# Patient Record
Sex: FEMALE | Race: WHITE | NOT HISPANIC OR LATINO | Employment: UNEMPLOYED | URBAN - METROPOLITAN AREA
[De-identification: names, ages, dates, MRNs, and addresses within clinical notes are randomized per-mention and may not be internally consistent; named-entity substitution may affect disease eponyms.]

---

## 2023-03-21 PROBLEM — S09.90XA HEAD INJURY: Status: ACTIVE | Noted: 2023-03-21

## 2023-03-21 PROBLEM — J30.2 SEASONAL ALLERGIES: Status: ACTIVE | Noted: 2023-03-21

## 2023-03-21 PROBLEM — G47.33 SEVERE OBSTRUCTIVE SLEEP APNEA: Status: ACTIVE | Noted: 2023-03-21

## 2023-03-21 PROBLEM — G47.00 INSOMNIA: Status: ACTIVE | Noted: 2023-03-21

## 2023-03-21 PROBLEM — S49.91XA INJURY OF RIGHT SHOULDER: Status: ACTIVE | Noted: 2023-03-21

## 2023-03-21 PROBLEM — R13.10 DYSPHAGIA: Status: ACTIVE | Noted: 2023-03-21
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
## 2023-03-21 PROBLEM — R00.2 PALPITATIONS: Status: ACTIVE | Noted: 2023-03-21

## 2023-03-21 PROBLEM — R06.02 SHORTNESS OF BREATH AT REST: Status: ACTIVE | Noted: 2023-03-21

## 2023-03-21 PROBLEM — L50.0 ALLERGIC URTICARIA: Status: ACTIVE | Noted: 2023-03-21

## 2023-03-21 PROBLEM — R42 DIZZINESS: Status: ACTIVE | Noted: 2023-03-21

## 2023-03-21 PROBLEM — B35.9 DERMATOPHYTOSIS: Status: ACTIVE | Noted: 2023-03-21

## 2023-03-21 PROBLEM — R19.8 GASTROINTESTINAL COMPLAINTS: Status: ACTIVE | Noted: 2023-03-21

## 2023-03-21 PROBLEM — R43.1 PAROSMIA: Status: ACTIVE | Noted: 2023-03-21

## 2023-03-21 PROBLEM — F41.9 ANXIETY: Status: ACTIVE | Noted: 2023-03-21

## 2023-03-21 PROBLEM — L65.9 HAIR LOSS: Status: ACTIVE | Noted: 2023-03-21

## 2023-03-21 PROBLEM — E07.89 THYROID FULLNESS: Status: ACTIVE | Noted: 2023-03-21

## 2023-03-21 PROBLEM — J96.10 CHRONIC RESPIRATORY FAILURE (MULTI): Status: ACTIVE | Noted: 2023-03-21

## 2023-03-21 PROBLEM — R25.1 TREMOR: Status: ACTIVE | Noted: 2023-03-21

## 2023-03-21 PROBLEM — M25.50 JOINT PAIN: Status: ACTIVE | Noted: 2023-03-21

## 2023-03-21 PROBLEM — R10.9 FLANK PAIN: Status: ACTIVE | Noted: 2023-03-21

## 2023-03-21 PROBLEM — E55.9 VITAMIN D DEFICIENCY: Status: ACTIVE | Noted: 2023-03-21

## 2023-03-21 PROBLEM — M62.838 CERVICAL PARASPINAL MUSCLE SPASM: Status: ACTIVE | Noted: 2023-03-21

## 2023-03-21 PROBLEM — R41.0 CONFUSION: Status: ACTIVE | Noted: 2023-03-21

## 2023-03-21 PROBLEM — R09.02 HYPOXIC: Status: ACTIVE | Noted: 2023-03-21

## 2023-03-21 PROBLEM — M19.90 ARTHRITIS: Status: ACTIVE | Noted: 2023-03-21

## 2023-03-21 PROBLEM — M79.10 MYALGIA: Status: ACTIVE | Noted: 2023-03-21

## 2023-03-21 PROBLEM — Q21.12 PFO (PATENT FORAMEN OVALE) (HHS-HCC): Status: ACTIVE | Noted: 2023-03-21

## 2023-03-21 PROBLEM — R06.09 OTHER FORMS OF DYSPNEA: Status: ACTIVE | Noted: 2023-03-21

## 2023-03-21 PROBLEM — R51.9 HEADACHE: Status: ACTIVE | Noted: 2023-03-21

## 2023-03-21 PROBLEM — M54.10 RADICULOPATHY OF ARM: Status: ACTIVE | Noted: 2023-03-21

## 2023-03-21 PROBLEM — I63.9 CVA (CEREBRAL VASCULAR ACCIDENT) (MULTI): Status: ACTIVE | Noted: 2023-03-21

## 2023-03-21 PROBLEM — R20.0 NUMBNESS AND TINGLING: Status: ACTIVE | Noted: 2023-03-21

## 2023-03-21 PROBLEM — E78.1 HYPERTRIGLYCERIDEMIA: Status: ACTIVE | Noted: 2023-03-21

## 2023-03-21 PROBLEM — R79.89 ELEVATED LFTS: Status: ACTIVE | Noted: 2023-03-21

## 2023-03-21 PROBLEM — R20.2 NUMBNESS AND TINGLING: Status: ACTIVE | Noted: 2023-03-21

## 2023-03-21 PROBLEM — F41.1 GAD (GENERALIZED ANXIETY DISORDER): Status: ACTIVE | Noted: 2023-03-21

## 2023-03-21 PROBLEM — U09.9 POST COVID-19 CONDITION, UNSPECIFIED: Status: ACTIVE | Noted: 2023-03-21

## 2023-03-21 PROBLEM — R53.83 FATIGUE: Status: ACTIVE | Noted: 2023-03-21

## 2023-03-21 PROBLEM — R41.89 BRAIN FOG: Status: ACTIVE | Noted: 2023-03-21

## 2023-03-21 PROBLEM — Z86.16 PERSONAL HISTORY OF COVID-19: Status: ACTIVE | Noted: 2023-03-21

## 2023-03-21 PROBLEM — J96.01 ACUTE RESPIRATORY FAILURE WITH HYPOXIA (MULTI): Status: ACTIVE | Noted: 2023-03-21

## 2023-03-21 PROBLEM — I10 BENIGN ESSENTIAL HTN: Status: ACTIVE | Noted: 2023-03-21

## 2023-03-21 PROBLEM — R21 RASH: Status: ACTIVE | Noted: 2023-03-21

## 2023-03-21 PROBLEM — J31.0 RHINITIS: Status: ACTIVE | Noted: 2023-03-21

## 2023-03-21 PROBLEM — F90.9 ATTENTION DEFICIT HYPERACTIVITY DISORDER (ADHD): Status: ACTIVE | Noted: 2023-03-21

## 2023-03-21 PROBLEM — K85.90 PANCREATITIS, ACUTE (HHS-HCC): Status: ACTIVE | Noted: 2023-03-21

## 2023-03-21 PROBLEM — G47.30 SLEEP DISORDER BREATHING: Status: ACTIVE | Noted: 2023-03-21

## 2023-03-21 RX ORDER — ONDANSETRON 4 MG/1
1 TABLET, FILM COATED ORAL EVERY 4 HOURS PRN
COMMUNITY
Start: 2022-12-29 | End: 2023-12-08 | Stop reason: ENTERED-IN-ERROR

## 2023-03-21 RX ORDER — LEVALBUTEROL INHALATION SOLUTION 0.63 MG/3ML
0.63 SOLUTION RESPIRATORY (INHALATION) AS NEEDED
COMMUNITY
Start: 2022-09-08 | End: 2023-11-28 | Stop reason: WASHOUT

## 2023-03-21 RX ORDER — HYDROCODONE BITARTRATE AND ACETAMINOPHEN 5; 300 MG/1; MG/1
1-2 TABLET ORAL AS NEEDED
COMMUNITY
Start: 2022-12-22 | End: 2023-04-07 | Stop reason: ALTCHOICE

## 2023-03-21 RX ORDER — CHOLECALCIFEROL (VITAMIN D3) 125 MCG
CAPSULE ORAL
COMMUNITY
End: 2023-04-07 | Stop reason: ALTCHOICE

## 2023-03-21 RX ORDER — AZELASTINE HCL 205.5 UG/1
2 SPRAY NASAL 2 TIMES DAILY
COMMUNITY
Start: 2022-09-12 | End: 2023-04-07 | Stop reason: ALTCHOICE

## 2023-03-21 RX ORDER — HYDROXYZINE HYDROCHLORIDE 25 MG/1
1-2 TABLET, FILM COATED ORAL NIGHTLY
COMMUNITY
Start: 2021-11-17 | End: 2023-04-07 | Stop reason: SDUPTHER

## 2023-03-21 RX ORDER — PRIMIDONE 50 MG/1
2 TABLET ORAL DAILY
COMMUNITY
Start: 2022-04-11 | End: 2023-04-07 | Stop reason: SDUPTHER

## 2023-03-21 RX ORDER — PANTOPRAZOLE SODIUM 40 MG/1
1 TABLET, DELAYED RELEASE ORAL DAILY
COMMUNITY
Start: 2021-11-17 | End: 2023-11-28 | Stop reason: WASHOUT

## 2023-03-21 RX ORDER — TRIAMCINOLONE ACETONIDE 1 MG/G
CREAM TOPICAL
COMMUNITY
Start: 2020-08-19 | End: 2023-11-28 | Stop reason: WASHOUT

## 2023-03-21 RX ORDER — SERTRALINE HYDROCHLORIDE 100 MG/1
1.5 TABLET, FILM COATED ORAL DAILY
COMMUNITY
Start: 2017-04-11 | End: 2023-11-02 | Stop reason: SDUPTHER

## 2023-03-21 RX ORDER — ASPIRIN 81 MG/1
1 TABLET ORAL DAILY
COMMUNITY
End: 2023-04-07 | Stop reason: ALTCHOICE

## 2023-03-21 RX ORDER — GUAIFENESIN 600 MG/1
600 TABLET, EXTENDED RELEASE ORAL EVERY 12 HOURS PRN
COMMUNITY
Start: 2021-11-17 | End: 2024-04-30 | Stop reason: ALTCHOICE

## 2023-03-21 RX ORDER — FLUOCINONIDE 0.5 MG/G
CREAM TOPICAL
COMMUNITY
Start: 2021-05-26 | End: 2023-11-28 | Stop reason: WASHOUT

## 2023-03-21 RX ORDER — AMOXICILLIN 500 MG/1
TABLET, FILM COATED ORAL
COMMUNITY
Start: 2022-03-09 | End: 2023-04-07 | Stop reason: ALTCHOICE

## 2023-03-21 RX ORDER — IPRATROPIUM BROMIDE AND ALBUTEROL SULFATE 2.5; .5 MG/3ML; MG/3ML
3 SOLUTION RESPIRATORY (INHALATION) EVERY 6 HOURS PRN
COMMUNITY
Start: 2021-11-17 | End: 2023-12-08 | Stop reason: ENTERED-IN-ERROR

## 2023-03-21 RX ORDER — PRIMIDONE 50 MG/1
50 TABLET ORAL 2 TIMES DAILY PRN
COMMUNITY
Start: 2022-04-11

## 2023-03-21 RX ORDER — FLUTICASONE PROPIONATE 50 MCG
2 SPRAY, SUSPENSION (ML) NASAL DAILY PRN
COMMUNITY
Start: 2017-09-07 | End: 2024-02-08 | Stop reason: WASHOUT

## 2023-03-21 RX ORDER — CLOPIDOGREL BISULFATE 75 MG/1
1 TABLET ORAL DAILY
COMMUNITY
Start: 2022-02-18 | End: 2023-11-28 | Stop reason: ALTCHOICE

## 2023-03-21 RX ORDER — NAPROXEN 500 MG/1
500 TABLET ORAL EVERY 12 HOURS
COMMUNITY
Start: 2023-01-17 | End: 2023-04-07 | Stop reason: ALTCHOICE

## 2023-03-21 RX ORDER — FLUTICASONE PROPIONATE AND SALMETEROL 500; 50 UG/1; UG/1
1 POWDER RESPIRATORY (INHALATION) 2 TIMES DAILY
COMMUNITY
Start: 2022-02-01 | End: 2023-11-28 | Stop reason: WASHOUT

## 2023-03-21 RX ORDER — ACETAMINOPHEN 325 MG/1
TABLET ORAL EVERY 6 HOURS PRN
COMMUNITY
Start: 2022-03-09 | End: 2023-04-07 | Stop reason: ALTCHOICE

## 2023-03-21 RX ORDER — CLOTRIMAZOLE AND BETAMETHASONE DIPROPIONATE 10; .64 MG/G; MG/G
1 CREAM TOPICAL 2 TIMES DAILY
COMMUNITY
Start: 2021-07-13 | End: 2023-04-07 | Stop reason: ALTCHOICE

## 2023-03-21 RX ORDER — ELECTROLYTES/DEXTROSE
SOLUTION, ORAL ORAL
COMMUNITY
End: 2023-04-07 | Stop reason: ALTCHOICE

## 2023-03-21 RX ORDER — PRAVASTATIN SODIUM 20 MG/1
20 TABLET ORAL NIGHTLY
COMMUNITY
Start: 2022-06-24 | End: 2023-11-28 | Stop reason: WASHOUT

## 2023-03-21 RX ORDER — HYDROCODONE BITARTRATE AND ACETAMINOPHEN 5; 300 MG/1; MG/1
TABLET ORAL
COMMUNITY
Start: 2022-12-22 | End: 2023-04-07 | Stop reason: ALTCHOICE

## 2023-03-21 RX ORDER — METOPROLOL SUCCINATE 50 MG/1
1.5 TABLET, EXTENDED RELEASE ORAL DAILY
COMMUNITY
Start: 2019-08-29 | End: 2023-11-28 | Stop reason: WASHOUT

## 2023-03-21 RX ORDER — CYCLOBENZAPRINE HCL 10 MG
10 TABLET ORAL AS NEEDED
COMMUNITY
Start: 2022-11-10 | End: 2023-04-07 | Stop reason: ALTCHOICE

## 2023-04-02 PROBLEM — S09.90XA INJURY OF HEAD: Status: ACTIVE | Noted: 2023-04-02

## 2023-04-02 PROBLEM — U07.1 COVID-19: Status: ACTIVE | Noted: 2023-04-02

## 2023-04-02 PROBLEM — R06.02 SHORTNESS OF BREATH: Status: ACTIVE | Noted: 2023-04-02

## 2023-04-02 PROBLEM — R06.09 CHRONIC DYSPNEA: Status: ACTIVE | Noted: 2023-04-02

## 2023-04-03 ENCOUNTER — APPOINTMENT (OUTPATIENT)
Dept: PRIMARY CARE | Facility: CLINIC | Age: 58
End: 2023-04-03
Payer: COMMERCIAL

## 2023-04-07 ENCOUNTER — OFFICE VISIT (OUTPATIENT)
Dept: PRIMARY CARE | Facility: CLINIC | Age: 58
End: 2023-04-07
Payer: COMMERCIAL

## 2023-04-07 VITALS
WEIGHT: 137.4 LBS | HEART RATE: 79 BPM | BODY MASS INDEX: 22.18 KG/M2 | DIASTOLIC BLOOD PRESSURE: 74 MMHG | OXYGEN SATURATION: 99 % | SYSTOLIC BLOOD PRESSURE: 100 MMHG

## 2023-04-07 DIAGNOSIS — M19.031 LOCALIZED PRIMARY OSTEOARTHRITIS OF CARPOMETACARPAL (CMC) JOINT OF RIGHT WRIST: Primary | ICD-10-CM

## 2023-04-07 DIAGNOSIS — L50.0 ALLERGIC URTICARIA: ICD-10-CM

## 2023-04-07 DIAGNOSIS — M79.10 MYALGIA: ICD-10-CM

## 2023-04-07 DIAGNOSIS — R55 SYNCOPE, UNSPECIFIED SYNCOPE TYPE: ICD-10-CM

## 2023-04-07 PROBLEM — M25.559 HIP PAIN: Status: ACTIVE | Noted: 2023-04-07

## 2023-04-07 PROBLEM — Z98.890 HISTORY OF ENDOMETRIAL ABLATION: Status: ACTIVE | Noted: 2017-03-27

## 2023-04-07 PROBLEM — N81.11 CYSTOCELE, MIDLINE: Status: ACTIVE | Noted: 2017-03-27

## 2023-04-07 PROBLEM — S69.90XA INJURY OF WRIST: Status: ACTIVE | Noted: 2023-04-07

## 2023-04-07 PROBLEM — F98.8 CHILD ATTENTION DEFICIT DISORDER: Status: ACTIVE | Noted: 2023-04-07

## 2023-04-07 PROBLEM — K21.9 GERD (GASTROESOPHAGEAL REFLUX DISEASE): Status: ACTIVE | Noted: 2023-04-07

## 2023-04-07 PROBLEM — F32.A DEPRESSIVE DISORDER: Status: ACTIVE | Noted: 2023-04-07

## 2023-04-07 PROBLEM — J30.9 ALLERGIC RHINITIS: Status: ACTIVE | Noted: 2023-04-07

## 2023-04-07 PROBLEM — Z86.018 HISTORY OF UTERINE FIBROID: Status: ACTIVE | Noted: 2017-03-27

## 2023-04-07 PROBLEM — M77.10 LATERAL EPICONDYLITIS: Status: ACTIVE | Noted: 2023-04-07

## 2023-04-07 PROBLEM — F90.0 ATTENTION DEFICIT HYPERACTIVITY DISORDER, PREDOMINANTLY INATTENTIVE TYPE: Status: ACTIVE | Noted: 2023-04-07

## 2023-04-07 PROCEDURE — 3008F BODY MASS INDEX DOCD: CPT | Performed by: STUDENT IN AN ORGANIZED HEALTH CARE EDUCATION/TRAINING PROGRAM

## 2023-04-07 PROCEDURE — 3078F DIAST BP <80 MM HG: CPT | Performed by: STUDENT IN AN ORGANIZED HEALTH CARE EDUCATION/TRAINING PROGRAM

## 2023-04-07 PROCEDURE — 99214 OFFICE O/P EST MOD 30 MIN: CPT | Performed by: STUDENT IN AN ORGANIZED HEALTH CARE EDUCATION/TRAINING PROGRAM

## 2023-04-07 PROCEDURE — 3074F SYST BP LT 130 MM HG: CPT | Performed by: STUDENT IN AN ORGANIZED HEALTH CARE EDUCATION/TRAINING PROGRAM

## 2023-04-07 RX ORDER — NAPROXEN SODIUM 220 MG/1
81 TABLET, FILM COATED ORAL DAILY
COMMUNITY
Start: 2022-04-20

## 2023-04-07 RX ORDER — METOPROLOL SUCCINATE 25 MG/1
1 TABLET, EXTENDED RELEASE ORAL DAILY
COMMUNITY
Start: 2022-08-16 | End: 2023-12-08 | Stop reason: ENTERED-IN-ERROR

## 2023-04-07 RX ORDER — KETOROLAC TROMETHAMINE 30 MG/ML
2 INJECTION, SOLUTION INTRAMUSCULAR; INTRAVENOUS
COMMUNITY
End: 2023-04-07 | Stop reason: ALTCHOICE

## 2023-04-07 RX ORDER — DEXAMETHASONE 4 MG/1
1 TABLET ORAL 2 TIMES DAILY
COMMUNITY
Start: 2022-08-05 | End: 2023-11-28 | Stop reason: WASHOUT

## 2023-04-07 RX ORDER — SERTRALINE HYDROCHLORIDE 100 MG/1
100 TABLET, FILM COATED ORAL
COMMUNITY
End: 2023-11-28 | Stop reason: WASHOUT

## 2023-04-07 RX ORDER — NIRMATRELVIR AND RITONAVIR 300-100 MG
KIT ORAL
COMMUNITY
Start: 2022-08-30 | End: 2023-11-28 | Stop reason: WASHOUT

## 2023-04-07 RX ORDER — NAPROXEN SODIUM 550 MG/1
TABLET ORAL
COMMUNITY
End: 2023-11-28 | Stop reason: WASHOUT

## 2023-04-07 RX ORDER — CYCLOBENZAPRINE HCL 10 MG
TABLET ORAL
COMMUNITY
End: 2023-07-18 | Stop reason: SDUPTHER

## 2023-04-07 RX ORDER — CLINDAMYCIN PHOSPHATE 11.9 MG/ML
SOLUTION TOPICAL
COMMUNITY
Start: 2022-08-05 | End: 2023-12-08 | Stop reason: ENTERED-IN-ERROR

## 2023-04-07 RX ORDER — OMEPRAZOLE 40 MG/1
40 CAPSULE, DELAYED RELEASE ORAL
COMMUNITY
End: 2023-12-08 | Stop reason: ENTERED-IN-ERROR

## 2023-04-07 RX ORDER — ALBUTEROL SULFATE 90 UG/1
2 AEROSOL, METERED RESPIRATORY (INHALATION) EVERY 4 HOURS PRN
COMMUNITY

## 2023-04-07 RX ORDER — CLOBETASOL PROPIONATE 0.5 MG/G
CREAM TOPICAL
COMMUNITY
Start: 2022-08-07 | End: 2023-11-28 | Stop reason: WASHOUT

## 2023-04-07 RX ORDER — PRAVASTATIN SODIUM 40 MG/1
40 TABLET ORAL NIGHTLY
COMMUNITY
Start: 2022-06-14 | End: 2023-12-08 | Stop reason: ENTERED-IN-ERROR

## 2023-04-07 RX ORDER — HYDROCODONE BITARTRATE AND ACETAMINOPHEN 5; 325 MG/1; MG/1
TABLET ORAL
COMMUNITY
Start: 2022-11-11 | End: 2023-04-07 | Stop reason: ALTCHOICE

## 2023-04-07 RX ORDER — ATORVASTATIN CALCIUM 80 MG/1
1 TABLET, FILM COATED ORAL DAILY
COMMUNITY
Start: 2022-05-01 | End: 2023-12-08 | Stop reason: ENTERED-IN-ERROR

## 2023-04-07 RX ORDER — HYDROXYZINE HYDROCHLORIDE 25 MG/1
25 TABLET, FILM COATED ORAL EVERY 8 HOURS PRN
Qty: 90 TABLET | Refills: 2 | Status: SHIPPED | OUTPATIENT
Start: 2023-04-07 | End: 2024-05-13 | Stop reason: SDUPTHER

## 2023-04-07 RX ORDER — NAPROXEN 375 MG/1
TABLET ORAL
COMMUNITY
End: 2023-04-07 | Stop reason: ALTCHOICE

## 2023-04-07 RX ORDER — NAPROXEN 500 MG/1
500 TABLET ORAL EVERY 12 HOURS
Qty: 60 TABLET | Refills: 2 | Status: SHIPPED | OUTPATIENT
Start: 2023-04-07 | End: 2023-05-07

## 2023-04-07 RX ORDER — DEXTROAMPHETAMINE SACCHARATE, AMPHETAMINE ASPARTATE, DEXTROAMPHETAMINE SULFATE AND AMPHETAMINE SULFATE 7.5; 7.5; 7.5; 7.5 MG/1; MG/1; MG/1; MG/1
TABLET ORAL
COMMUNITY
End: 2023-11-28 | Stop reason: WASHOUT

## 2023-04-07 RX ORDER — HYDROCODONE BITARTRATE AND ACETAMINOPHEN 5; 325 MG/1; MG/1
1 TABLET ORAL EVERY 6 HOURS PRN
Qty: 20 TABLET | Refills: 0 | Status: SHIPPED | OUTPATIENT
Start: 2023-04-07 | End: 2023-04-17

## 2023-04-07 NOTE — PATIENT INSTRUCTIONS
1.  Hospital follow-up after syncope event.  There was a medication change metoprolol was increased.  Advised to go back on metoprolol 50 mg once per day.    We will order additional work-up including Holter monitor echocardiogram, EEG, and carotid duplex.    2.  Severe right thumb CMC joint arthritis.  Has needed injection therapy before.  Would advise in seeing hand surgeon.    Andres Vega DO  for questions and f/u please call office   [place] [phone] [facility] [phone]  any forms needed please fax   [place] [phone] [facility] [phone]  for Physical therapy orders can call [phone]  for Radiology orders can call [phone]  for general referrals can call [phone]  for cardiac testing can call [phone]

## 2023-04-08 NOTE — PROGRESS NOTES
Subjective   Patient ID: Nicolasa Burger is a 57 y.o. female who presents for Hospital Follow-up and Med Refill.    HPI comes in for hospital follow-up after syncopal event.  Essentially work-up was negative in the hospital.  CT does show evidence of a prior stroke.    Review of Systems  Constitutional: NO F, chills, or sweats  Eyes: no blurred vision or visual disturbance  ENT: no hearing loss, no congestion, no nasal discharge, no hoarseness and no sore throat.   Cardiovascular: no chest pain, no edema, no palps, hospital follow-up for syncope  Respiratory: no cough,no s.o.b. and no wheezing  Gastrointestinal: no abdominal pain, No C/D no N/V, no blood in stools  Genitourinary: no dysuria, no change in urinary frequency, no urinary hesitancy and no feelings of urinary urgency.   Musculoskeletal bilateral thumb pain hand pain trigger thumbs  Integumentary: no new skin lesions and no rashes.   Neurological: no difficulty walking, no headache, no limb weakness, no numbness and no tingling.       Objective   /74 (BP Location: Left arm, Patient Position: Sitting, BP Cuff Size: Adult)   Pulse 79   Wt 62.3 kg (137 lb 6.4 oz)   SpO2 99%   BMI 22.18 kg/m²     Physical Exam  gen- a & o x 3, nad, pleasant  heent- eomi, perrla, ear canals patent, TM's non-erythematous, no fluid, frontal and maxillary sinus's nontender  neck- supple, nontender, no palpable or enlarged nodes, no thyromegaly  heart- rrr, no murmurs  lungs- cta b/l , no w/r/r  chest- symmetric, nontender  ab- soft, nontender, no palpable organomegaly, postive bowel sounds  ex's- no c/c/e  neuro- CNs 2-12 grossly intact, full sensation and strength in all extremities    Assessment/Plan     1.  Hospital follow-up after syncope event.  There was a medication change metoprolol was increased.  Advised to go back on metoprolol 50 mg once per day.    We will order additional work-up including Holter monitor echocardiogram, EEG, and carotid duplex.    2.  Severe  right thumb CMC joint arthritis.  Has needed injection therapy before.  Would advise in seeing hand surgeon.

## 2023-04-18 DIAGNOSIS — R55 SYNCOPE, UNSPECIFIED SYNCOPE TYPE: ICD-10-CM

## 2023-07-18 DIAGNOSIS — M19.90 ARTHRITIS: Primary | ICD-10-CM

## 2023-07-18 RX ORDER — CYCLOBENZAPRINE HCL 10 MG
TABLET ORAL
Qty: 30 TABLET | Refills: 2 | Status: SHIPPED | OUTPATIENT
Start: 2023-07-18 | End: 2023-08-04 | Stop reason: SDUPTHER

## 2023-08-03 ENCOUNTER — TELEPHONE (OUTPATIENT)
Dept: PRIMARY CARE | Facility: CLINIC | Age: 58
End: 2023-08-03
Payer: COMMERCIAL

## 2023-08-03 DIAGNOSIS — M19.90 ARTHRITIS: ICD-10-CM

## 2023-08-03 NOTE — TELEPHONE ENCOUNTER
[facility] pharmacy left a VM needing directions for cyclobenzaprine 10mg. Please send in new prescription with directions.

## 2023-08-04 RX ORDER — CYCLOBENZAPRINE HCL 10 MG
TABLET ORAL
Qty: 30 TABLET | Refills: 2 | Status: SHIPPED | OUTPATIENT
Start: 2023-08-04 | End: 2023-08-11 | Stop reason: SDUPTHER

## 2023-08-11 DIAGNOSIS — M19.90 ARTHRITIS: ICD-10-CM

## 2023-08-11 RX ORDER — CYCLOBENZAPRINE HCL 10 MG
TABLET ORAL
Qty: 30 TABLET | Refills: 2 | Status: SHIPPED | OUTPATIENT
Start: 2023-08-11 | End: 2023-11-28 | Stop reason: WASHOUT

## 2023-11-02 DIAGNOSIS — F41.9 ANXIETY: Primary | ICD-10-CM

## 2023-11-02 RX ORDER — SERTRALINE HYDROCHLORIDE 100 MG/1
150 TABLET, FILM COATED ORAL DAILY
Qty: 135 TABLET | Refills: 3 | Status: SHIPPED | OUTPATIENT
Start: 2023-11-02 | End: 2024-11-01

## 2023-11-22 PROBLEM — M18.12 ARTHRITIS OF CARPOMETACARPAL (CMC) JOINT OF LEFT THUMB: Status: ACTIVE | Noted: 2023-11-22

## 2023-11-22 PROBLEM — R41.89 COGNITIVE DEFICITS: Status: ACTIVE | Noted: 2023-11-22

## 2023-11-22 PROBLEM — M62.50 MUSCLE ATROPHY: Status: ACTIVE | Noted: 2023-11-22

## 2023-11-22 PROBLEM — R47.9 SPEECH ABNORMALITY: Status: ACTIVE | Noted: 2023-11-22

## 2023-11-22 PROBLEM — G56.12 MONONEUROPATHY OF LEFT MEDIAN NERVE: Status: ACTIVE | Noted: 2023-11-22

## 2023-11-22 PROBLEM — G56.11 MONONEUROPATHY OF RIGHT MEDIAN NERVE: Status: ACTIVE | Noted: 2023-11-22

## 2023-11-22 PROBLEM — M79.641 PAIN IN BOTH HANDS: Status: ACTIVE | Noted: 2023-11-22

## 2023-11-22 PROBLEM — M65.311 TRIGGER THUMB OF RIGHT HAND: Status: ACTIVE | Noted: 2023-11-22

## 2023-11-22 PROBLEM — M18.11 ARTHRITIS OF CARPOMETACARPAL (CMC) JOINT OF RIGHT THUMB: Status: ACTIVE | Noted: 2023-11-22

## 2023-11-22 PROBLEM — M65.312 TRIGGER THUMB OF LEFT HAND: Status: ACTIVE | Noted: 2023-11-22

## 2023-11-22 PROBLEM — M79.642 PAIN IN BOTH HANDS: Status: ACTIVE | Noted: 2023-11-22

## 2023-11-28 ENCOUNTER — OFFICE VISIT (OUTPATIENT)
Dept: CARDIOLOGY | Facility: CLINIC | Age: 58
End: 2023-11-28
Payer: COMMERCIAL

## 2023-11-28 VITALS
HEART RATE: 67 BPM | OXYGEN SATURATION: 97 % | HEIGHT: 66 IN | BODY MASS INDEX: 24.43 KG/M2 | SYSTOLIC BLOOD PRESSURE: 112 MMHG | WEIGHT: 152 LBS | DIASTOLIC BLOOD PRESSURE: 78 MMHG

## 2023-11-28 DIAGNOSIS — R00.2 PALPITATIONS: ICD-10-CM

## 2023-11-28 DIAGNOSIS — I63.9 CEREBROVASCULAR ACCIDENT (CVA), UNSPECIFIED MECHANISM (MULTI): ICD-10-CM

## 2023-11-28 DIAGNOSIS — I10 BENIGN ESSENTIAL HTN: ICD-10-CM

## 2023-11-28 DIAGNOSIS — Q21.12 PFO (PATENT FORAMEN OVALE) (HHS-HCC): Primary | ICD-10-CM

## 2023-11-28 PROCEDURE — 99214 OFFICE O/P EST MOD 30 MIN: CPT | Performed by: INTERNAL MEDICINE

## 2023-11-28 PROCEDURE — 3008F BODY MASS INDEX DOCD: CPT | Performed by: INTERNAL MEDICINE

## 2023-11-28 PROCEDURE — 3074F SYST BP LT 130 MM HG: CPT | Performed by: INTERNAL MEDICINE

## 2023-11-28 PROCEDURE — 3078F DIAST BP <80 MM HG: CPT | Performed by: INTERNAL MEDICINE

## 2023-11-28 NOTE — PROGRESS NOTES
[facility] Cardiology Outpatient Follow-up Visit     Reason for Visit: PFO, CVA    HPI: Nicolasa Burger is a 58 y.o.  female who presents today for followup.     Patient is a 57-year-old female with a history of hypertension, dyslipidemia who initially presented in November 2021 with severe COVID pneumonia. This was complicated by acute stroke which was treated with TPA. CT of the head confirmed a right parietal infarct. She had residual left facial droop, dysarthria and left-sided weakness which is since resolved. She is placed on aspirin, high intensity statin and low-dose Xarelto. She underwent transthoracic echo which demonstrated atrial septal aneurysm with PFO. She underwent percutaneous PFO closure with a 30 mm Cardioform device March 2022. She denies any chest discomfort. She denies any lightheadedness, syncope, orthopnea, PND, lower extremity edema. She does admit to rare palpitations.     Transthoracic echo 2/10/2022: Normal LV function, atrial septal aneurysm with PFO. 14-day event recorder negative for atrial fibrillation. Twelve-lead ECG 2/18/2022: Normal sinus rhythm, left atrial enlargement. 4/19/2022 echo: Normal LV function, PFO occluder in place without shunt based on color flow and bubble study. 6/24/22 echo: Normal LV function, PFO occluder in place without shunt based on color flow and bubble study. June 2022 event recorder: No significant arrhythmias. 1 episode of sinus tachycardia at 126 bpm. 6/8/2023 echo: Ejection fraction 60 to 65%, impaired relaxation, PFO closure device in place, no evidence of PFO.     Past Medical History:   She has a past medical history of Acute upper respiratory infection, unspecified (11/01/2021), Effusion, unspecified knee (07/02/2021), Encounter for screening for malignant neoplasm of colon (05/06/2015), Pain in left knee (07/06/2021), Pain in left knee (09/07/2017), Pain in left lower leg (07/02/2021), Pain in right finger(s), Personal history of other diseases  of the nervous system and sense organs (2016), Personal history of other diseases of the nervous system and sense organs, Personal history of other diseases of the respiratory system (02/10/2021), Personal history of other endocrine, nutritional and metabolic disease, Personal history of other infectious and parasitic diseases, Personal history of other infectious and parasitic diseases, Personal history of other infectious and parasitic diseases (2021), Personal history of other infectious and parasitic diseases (2021), Personal history of other medical treatment, Personal history of other medical treatment, Personal history of other specified conditions (2021), Right upper quadrant pain (2015), Segmental and somatic dysfunction of cervical region (2015), Strain of other muscle(s) and tendon(s) at lower leg level, left leg, initial encounter (2021), Strain of unspecified muscle(s) and tendon(s) at lower leg level, left leg, initial encounter (2021), Unilateral primary osteoarthritis, left knee (10/20/2016), Unspecified acute conjunctivitis, bilateral (2017), Unspecified tear of unspecified meniscus, current injury, left knee, initial encounter (2017), and Urinary tract infection, site not specified (2021).    Surgical History:   She has a past surgical history that includes  section, classic (2015); Endometrial ablation (2015); Belt abdominoplasty (2015); Other surgical history (2015); Appendectomy (2015); Other surgical history (2021); Other surgical history (2022); CT angio head w and wo IV contrast (2021); and CT angio neck (2021).    Family History:   Family History   Problem Relation Name Age of Onset    Anxiety disorder Mother      Allergies Father          seasonal    Alcohol abuse Father          severe    Breast cancer Sister         Allergies:  Patient has no known allergies.      Social History:   · Caffeine use (V49.89) (Z78.9)   · Former smoker (V15.82) (Z87.891)   · No drug use   · Occasional alcohol use    Prior Cardiovascular Testing (Personally Reviewed):     Review of Systems:  Review of Systems   All other systems reviewed and are negative.      Outpatient Medications:    Current Outpatient Medications:     albuterol 90 mcg/actuation inhaler, ProAir HFA 90 mcg/actuation aerosol inhaler, Disp: , Rfl:     aspirin 81 mg chewable tablet, CHEW AND SWALLOW ONE TABLET BY MOUTH ONCE DAILY, Disp: , Rfl:     atorvastatin (Lipitor) 80 mg tablet, Take 1 tablet (80 mg) by mouth once daily., Disp: , Rfl:     clindamycin (Cleocin T) 1 % external solution, APPLY TO AFFECTED AREA UNDER THE RIGHT ARM ONCE TO TWICE DAILY, Disp: , Rfl:     fluticasone (Flonase) 50 mcg/actuation nasal spray, Administer 2 sprays into affected nostril(s) once daily., Disp: , Rfl:     guaiFENesin (Mucinex) 600 mg 12 hr tablet, Take by mouth every 12 hours if needed., Disp: , Rfl:     ipratropium-albuteroL (Duo-Neb) 0.5-2.5 mg/3 mL nebulizer solution, Inhale 3 mL every 6 hours if needed., Disp: , Rfl:     metoprolol succinate XL (Toprol-XL) 25 mg 24 hr tablet, Take 1 tablet (25 mg) by mouth once daily., Disp: , Rfl:     omeprazole (PriLOSEC) 40 mg DR capsule, Take 1 capsule (40 mg) by mouth once daily., Disp: , Rfl:     ondansetron (Zofran) 4 mg tablet, Take 1 tablet (4 mg) by mouth every 4 hours if needed for nausea., Disp: , Rfl:     oxygen (O2) gas therapy, 2 Liters/PRN, Disp: , Rfl:     primidone (Mysoline) 50 mg tablet, Take by mouth twice a day., Disp: , Rfl:     sertraline (Zoloft) 100 mg tablet, Take 1.5 tablets (150 mg) by mouth once daily., Disp: 135 tablet, Rfl: 3    spirometer,drug delivery adapt device, USE AS DIRECTED EVERY 4 - 6 HOURS AS NEEDED FOR WHEEZING. Dx: COPD J44.9, Disp: , Rfl:     hydrOXYzine HCL (Atarax) 25 mg tablet, Take 1 tablet (25 mg) by mouth every 8 hours if needed for itching., Disp: 90  "tablet, Rfl: 2    pravastatin (Pravachol) 40 mg tablet, Take 1 tablet (40 mg) by mouth once daily at bedtime., Disp: , Rfl:      Last Recorded Vitals  /78 (BP Location: Left arm, Patient Position: Sitting, BP Cuff Size: Adult)   Pulse 67   Ht 1.676 m (5' 6\")   Wt 68.9 kg (152 lb)   SpO2 97%   BMI 24.53 kg/m²     Physical Exam:    Physical Exam  Vitals reviewed.   Constitutional:       Appearance: Normal appearance.   HENT:      Head: Normocephalic and atraumatic.      Mouth/Throat:      Mouth: Mucous membranes are moist.      Pharynx: Oropharynx is clear.   Cardiovascular:      Rate and Rhythm: Normal rate and regular rhythm.      Pulses: Normal pulses.      Heart sounds: Normal heart sounds.   Pulmonary:      Effort: Pulmonary effort is normal.      Breath sounds: Normal breath sounds.   Abdominal:      General: Bowel sounds are normal.      Palpations: Abdomen is soft.   Musculoskeletal:      Cervical back: Neck supple.   Skin:     General: Skin is warm and dry.   Neurological:      General: No focal deficit present.      Mental Status: She is alert.   Psychiatric:         Mood and Affect: Mood normal.         Behavior: Behavior normal.         Lab/Radiology/Diagnostic Review:    Labs    Lab Results   Component Value Date    GLUCOSE 63 (L) 12/28/2022    CALCIUM 10.0 12/28/2022     12/28/2022    K 4.6 12/28/2022    CO2 33 (H) 12/28/2022    CL 96 (L) 12/28/2022    BUN 18 12/28/2022    CREATININE 0.58 12/28/2022       Lab Results   Component Value Date    WBC 11.0 12/28/2022    HGB 12.2 12/28/2022    HCT 37.3 12/28/2022    MCV 97 12/28/2022     (H) 12/28/2022       Lab Results   Component Value Date    CHOL 249 (H) 12/15/2022    CHOL 193 11/08/2021    CHOL 275 (H) 08/29/2019     Lab Results   Component Value Date    HDL 63.6 12/15/2022    HDL 34.2 (A) 11/08/2021    HDL 61.3 08/29/2019     No results found for: \"LDLCALC\"  Lab Results   Component Value Date    TRIG 76 12/15/2022    TRIG 134 " "11/08/2021    TRIG 460 (H) 08/29/2019     No components found for: \"CHOLHDL\"    Lab Results   Component Value Date    BNP 95 11/08/2021       Lab Results   Component Value Date    TSH 3.21 12/28/2022       Assessment:   Patient still has palpitations on a regular basis. Her event recorder was negative for arrhythmias. 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     Patient will stay on aspirin.  She will stop clopidogrel therapy.     Continue metoprolol succinate 50 mg a day. Attempts to increase metoprolol to 75 mg a day resulted in syncope.     Patient will stay on statin therapy.     Patient will follow up with us in 1 to 2 years or sooner with more problems..     Thank you for your visit today. Please contact our office with any questions.     Andres Atkins MD      "

## 2023-11-28 NOTE — LETTER
November 28, 2023     Andres Vega DO  [address]  [address]  [facility] [address]    Patient: Nicolasa Burger   YOB: 1965   Date of Visit: 11/28/2023       Dear Dr. Andres Vega DO:    Thank you for referring Nicolasa Burger to me for evaluation. Below are my notes for this consultation.  If you have questions, please do not hesitate to call me. I look forward to following your patient along with you.       Sincerely,     Andres Atkins MD      CC: No Recipients  ______________________________________________________________________________________        [facility] Cardiology Outpatient Follow-up Visit     Reason for Visit: PFO, CVA    HPI: Nicolasa Burger is a 58 y.o.  female who presents today for followup.     Patient is a 57-year-old female with a history of hypertension, dyslipidemia who initially presented in November 2021 with severe COVID pneumonia. This was complicated by acute stroke which was treated with TPA. CT of the head confirmed a right parietal infarct. She had residual left facial droop, dysarthria and left-sided weakness which is since resolved. She is placed on aspirin, high intensity statin and low-dose Xarelto. She underwent transthoracic echo which demonstrated atrial septal aneurysm with PFO. She underwent percutaneous PFO closure with a 30 mm Cardioform device March 2022. She denies any chest discomfort. She denies any lightheadedness, syncope, orthopnea, PND, lower extremity edema. She does admit to rare palpitations.     Transthoracic echo 2/10/2022: Normal LV function, atrial septal aneurysm with PFO. 14-day event recorder negative for atrial fibrillation. Twelve-lead ECG 2/18/2022: Normal sinus rhythm, left atrial enlargement. 4/19/2022 echo: Normal LV function, PFO occluder in place without shunt based on color flow and bubble study. 6/24/22 echo: Normal LV function, PFO occluder in place without shunt based on color flow and bubble study. June 2022 event  recorder: No significant arrhythmias. 1 episode of sinus tachycardia at 126 bpm. 6/8/2023 echo: Ejection fraction 60 to 65%, impaired relaxation, PFO closure device in place, no evidence of PFO.     Past Medical History:   She has a past medical history of Acute upper respiratory infection, unspecified (11/01/2021), Effusion, unspecified knee (07/02/2021), Encounter for screening for malignant neoplasm of colon (05/06/2015), Pain in left knee (07/06/2021), Pain in left knee (09/07/2017), Pain in left lower leg (07/02/2021), Pain in right finger(s), Personal history of other diseases of the nervous system and sense organs (06/28/2016), Personal history of other diseases of the nervous system and sense organs, Personal history of other diseases of the respiratory system (02/10/2021), Personal history of other endocrine, nutritional and metabolic disease, Personal history of other infectious and parasitic diseases, Personal history of other infectious and parasitic diseases, Personal history of other infectious and parasitic diseases (07/13/2021), Personal history of other infectious and parasitic diseases (11/04/2021), Personal history of other medical treatment, Personal history of other medical treatment, Personal history of other specified conditions (07/19/2021), Right upper quadrant pain (07/29/2015), Segmental and somatic dysfunction of cervical region (12/12/2015), Strain of other muscle(s) and tendon(s) at lower leg level, left leg, initial encounter (07/02/2021), Strain of unspecified muscle(s) and tendon(s) at lower leg level, left leg, initial encounter (07/02/2021), Unilateral primary osteoarthritis, left knee (10/20/2016), Unspecified acute conjunctivitis, bilateral (07/12/2017), Unspecified tear of unspecified meniscus, current injury, left knee, initial encounter (09/07/2017), and Urinary tract infection, site not specified (08/16/2021).    Surgical History:   She has a past surgical history that  includes  section, classic (2015); Endometrial ablation (2015); Belt abdominoplasty (2015); Other surgical history (2015); Appendectomy (2015); Other surgical history (2021); Other surgical history (2022); CT angio head w and wo IV contrast (2021); and CT angio neck (2021).    Family History:   Family History   Problem Relation Name Age of Onset   • Anxiety disorder Mother     • Allergies Father          seasonal   • Alcohol abuse Father          severe   • Breast cancer Sister         Allergies:  Patient has no known allergies.     Social History:   · Caffeine use (V49.89) (Z78.9)   · Former smoker (V15.82) (Z87.891)   · No drug use   · Occasional alcohol use    Prior Cardiovascular Testing (Personally Reviewed):     Review of Systems:  Review of Systems   All other systems reviewed and are negative.      Outpatient Medications:    Current Outpatient Medications:   •  albuterol 90 mcg/actuation inhaler, ProAir HFA 90 mcg/actuation aerosol inhaler, Disp: , Rfl:   •  aspirin 81 mg chewable tablet, CHEW AND SWALLOW ONE TABLET BY MOUTH ONCE DAILY, Disp: , Rfl:   •  atorvastatin (Lipitor) 80 mg tablet, Take 1 tablet (80 mg) by mouth once daily., Disp: , Rfl:   •  clindamycin (Cleocin T) 1 % external solution, APPLY TO AFFECTED AREA UNDER THE RIGHT ARM ONCE TO TWICE DAILY, Disp: , Rfl:   •  fluticasone (Flonase) 50 mcg/actuation nasal spray, Administer 2 sprays into affected nostril(s) once daily., Disp: , Rfl:   •  guaiFENesin (Mucinex) 600 mg 12 hr tablet, Take by mouth every 12 hours if needed., Disp: , Rfl:   •  ipratropium-albuteroL (Duo-Neb) 0.5-2.5 mg/3 mL nebulizer solution, Inhale 3 mL every 6 hours if needed., Disp: , Rfl:   •  metoprolol succinate XL (Toprol-XL) 25 mg 24 hr tablet, Take 1 tablet (25 mg) by mouth once daily., Disp: , Rfl:   •  omeprazole (PriLOSEC) 40 mg DR capsule, Take 1 capsule (40 mg) by mouth once daily., Disp: , Rfl:   •   "ondansetron (Zofran) 4 mg tablet, Take 1 tablet (4 mg) by mouth every 4 hours if needed for nausea., Disp: , Rfl:   •  oxygen (O2) gas therapy, 2 Liters/PRN, Disp: , Rfl:   •  primidone (Mysoline) 50 mg tablet, Take by mouth twice a day., Disp: , Rfl:   •  sertraline (Zoloft) 100 mg tablet, Take 1.5 tablets (150 mg) by mouth once daily., Disp: 135 tablet, Rfl: 3  •  spirometer,drug delivery adapt device, USE AS DIRECTED EVERY 4 - 6 HOURS AS NEEDED FOR WHEEZING. Dx: COPD J44.9, Disp: , Rfl:   •  hydrOXYzine HCL (Atarax) 25 mg tablet, Take 1 tablet (25 mg) by mouth every 8 hours if needed for itching., Disp: 90 tablet, Rfl: 2  •  pravastatin (Pravachol) 40 mg tablet, Take 1 tablet (40 mg) by mouth once daily at bedtime., Disp: , Rfl:      Last Recorded Vitals  /78 (BP Location: Left arm, Patient Position: Sitting, BP Cuff Size: Adult)   Pulse 67   Ht 1.676 m (5' 6\")   Wt 68.9 kg (152 lb)   SpO2 97%   BMI 24.53 kg/m²     Physical Exam:    Physical Exam  Vitals reviewed.   Constitutional:       Appearance: Normal appearance.   HENT:      Head: Normocephalic and atraumatic.      Mouth/Throat:      Mouth: Mucous membranes are moist.      Pharynx: Oropharynx is clear.   Cardiovascular:      Rate and Rhythm: Normal rate and regular rhythm.      Pulses: Normal pulses.      Heart sounds: Normal heart sounds.   Pulmonary:      Effort: Pulmonary effort is normal.      Breath sounds: Normal breath sounds.   Abdominal:      General: Bowel sounds are normal.      Palpations: Abdomen is soft.   Musculoskeletal:      Cervical back: Neck supple.   Skin:     General: Skin is warm and dry.   Neurological:      General: No focal deficit present.      Mental Status: She is alert.   Psychiatric:         Mood and Affect: Mood normal.         Behavior: Behavior normal.         Lab/Radiology/Diagnostic Review:    Labs    Lab Results   Component Value Date    GLUCOSE 63 (L) 12/28/2022    CALCIUM 10.0 12/28/2022     12/28/2022 " "   K 4.6 12/28/2022    CO2 33 (H) 12/28/2022    CL 96 (L) 12/28/2022    BUN 18 12/28/2022    CREATININE 0.58 12/28/2022       Lab Results   Component Value Date    WBC 11.0 12/28/2022    HGB 12.2 12/28/2022    HCT 37.3 12/28/2022    MCV 97 12/28/2022     (H) 12/28/2022       Lab Results   Component Value Date    CHOL 249 (H) 12/15/2022    CHOL 193 11/08/2021    CHOL 275 (H) 08/29/2019     Lab Results   Component Value Date    HDL 63.6 12/15/2022    HDL 34.2 (A) 11/08/2021    HDL 61.3 08/29/2019     No results found for: \"LDLCALC\"  Lab Results   Component Value Date    TRIG 76 12/15/2022    TRIG 134 11/08/2021    TRIG 460 (H) 08/29/2019     No components found for: \"CHOLHDL\"    Lab Results   Component Value Date    BNP 95 11/08/2021       Lab Results   Component Value Date    TSH 3.21 12/28/2022       Assessment:   Patient still has palpitations on a regular basis. Her event recorder was negative for arrhythmias. 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     Patient will stay on aspirin.  She will stop clopidogrel therapy.     Continue metoprolol succinate 50 mg a day. Attempts to increase metoprolol to 75 mg a day resulted in syncope.     Patient will stay on statin therapy.     Patient will follow up with us in 1 to 2 years or sooner with more problems..     Thank you for your visit today. Please contact our office with any questions.     Andres Atkins MD      "

## 2023-11-28 NOTE — LETTER
November 28, 2023       No Recipients    Patient: Nicolasa Burger   YOB: 1965   Date of Visit: 11/28/2023       Dear Dr. Anderson Recipients:    Thank you for referring Nicolasa Burger to me for evaluation. Below are my notes for this consultation.  If you have questions, please do not hesitate to call me. I look forward to following your patient along with you.       Sincerely,     Andres Atkins MD      CC:   No Recipients  ______________________________________________________________________________________        [facility] Outpatient Follow-up Visit     Reason for Visit: PFO, CVA    HPI: Nicolasa Burger is a 58 y.o.  female who presents today for followup.     Patient is a 57-year-old female with a history of hypertension, dyslipidemia who initially presented in November 2021 with severe COVID pneumonia. This was complicated by acute stroke which was treated with TPA. CT of the head confirmed a right parietal infarct. She had residual left facial droop, dysarthria and left-sided weakness which is since resolved. She is placed on aspirin, high intensity statin and low-dose Xarelto. She underwent transthoracic echo which demonstrated atrial septal aneurysm with PFO. She underwent percutaneous PFO closure with a 30 mm Cardioform device March 2022. She denies any chest discomfort. She denies any lightheadedness, syncope, orthopnea, PND, lower extremity edema. She does admit to rare palpitations.     Transthoracic echo 2/10/2022: Normal LV function, atrial septal aneurysm with PFO. 14-day event recorder negative for atrial fibrillation. Twelve-lead ECG 2/18/2022: Normal sinus rhythm, left atrial enlargement. 4/19/2022 echo: Normal LV function, PFO occluder in place without shunt based on color flow and bubble study. 6/24/22 echo: Normal LV function, PFO occluder in place without shunt based on color flow and bubble study. June 2022 event recorder: No significant arrhythmias. 1 episode of sinus  tachycardia at 126 bpm. 2023 echo: Ejection fraction 60 to 65%, impaired relaxation, PFO closure device in place, no evidence of PFO.     Past Medical History:   She has a past medical history of Acute upper respiratory infection, unspecified (2021), Effusion, unspecified knee (2021), Encounter for screening for malignant neoplasm of colon (2015), Pain in left knee (2021), Pain in left knee (2017), Pain in left lower leg (2021), Pain in right finger(s), Personal history of other diseases of the nervous system and sense organs (2016), Personal history of other diseases of the nervous system and sense organs, Personal history of other diseases of the respiratory system (02/10/2021), Personal history of other endocrine, nutritional and metabolic disease, Personal history of other infectious and parasitic diseases, Personal history of other infectious and parasitic diseases, Personal history of other infectious and parasitic diseases (2021), Personal history of other infectious and parasitic diseases (2021), Personal history of other medical treatment, Personal history of other medical treatment, Personal history of other specified conditions (2021), Right upper quadrant pain (2015), Segmental and somatic dysfunction of cervical region (2015), Strain of other muscle(s) and tendon(s) at lower leg level, left leg, initial encounter (2021), Strain of unspecified muscle(s) and tendon(s) at lower leg level, left leg, initial encounter (2021), Unilateral primary osteoarthritis, left knee (10/20/2016), Unspecified acute conjunctivitis, bilateral (2017), Unspecified tear of unspecified meniscus, current injury, left knee, initial encounter (2017), and Urinary tract infection, site not specified (2021).    Surgical History:   She has a past surgical history that includes  section, classic (2015); Endometrial  ablation (05/06/2015); Belt abdominoplasty (05/06/2015); Other surgical history (05/06/2015); Appendectomy (05/06/2015); Other surgical history (12/17/2021); Other surgical history (06/23/2022); CT angio head w and wo IV contrast (11/14/2021); and CT angio neck (11/14/2021).    Family History:   Family History   Problem Relation Name Age of Onset   • Anxiety disorder Mother     • Allergies Father          seasonal   • Alcohol abuse Father          severe   • Breast cancer Sister         Allergies:  Patient has no known allergies.     Social History:   · Caffeine use (V49.89) (Z78.9)   · Former smoker (V15.82) (Z87.891)   · No drug use   · Occasional alcohol use    Prior Cardiovascular Testing (Personally Reviewed):     Review of Systems:  Review of Systems   All other systems reviewed and are negative.      Outpatient Medications:    Current Outpatient Medications:   •  albuterol 90 mcg/actuation inhaler, ProAir HFA 90 mcg/actuation aerosol inhaler, Disp: , Rfl:   •  aspirin 81 mg chewable tablet, CHEW AND SWALLOW ONE TABLET BY MOUTH ONCE DAILY, Disp: , Rfl:   •  atorvastatin (Lipitor) 80 mg tablet, Take 1 tablet (80 mg) by mouth once daily., Disp: , Rfl:   •  clindamycin (Cleocin T) 1 % external solution, APPLY TO AFFECTED AREA UNDER THE RIGHT ARM ONCE TO TWICE DAILY, Disp: , Rfl:   •  fluticasone (Flonase) 50 mcg/actuation nasal spray, Administer 2 sprays into affected nostril(s) once daily., Disp: , Rfl:   •  guaiFENesin (Mucinex) 600 mg 12 hr tablet, Take by mouth every 12 hours if needed., Disp: , Rfl:   •  ipratropium-albuteroL (Duo-Neb) 0.5-2.5 mg/3 mL nebulizer solution, Inhale 3 mL every 6 hours if needed., Disp: , Rfl:   •  metoprolol succinate XL (Toprol-XL) 25 mg 24 hr tablet, Take 1 tablet (25 mg) by mouth once daily., Disp: , Rfl:   •  omeprazole (PriLOSEC) 40 mg DR capsule, Take 1 capsule (40 mg) by mouth once daily., Disp: , Rfl:   •  ondansetron (Zofran) 4 mg tablet, Take 1 tablet (4 mg) by mouth  "every 4 hours if needed for nausea., Disp: , Rfl:   •  oxygen (O2) gas therapy, 2 Liters/PRN, Disp: , Rfl:   •  primidone (Mysoline) 50 mg tablet, Take by mouth twice a day., Disp: , Rfl:   •  sertraline (Zoloft) 100 mg tablet, Take 1.5 tablets (150 mg) by mouth once daily., Disp: 135 tablet, Rfl: 3  •  spirometer,drug delivery adapt device, USE AS DIRECTED EVERY 4 - 6 HOURS AS NEEDED FOR WHEEZING. Dx: COPD J44.9, Disp: , Rfl:   •  hydrOXYzine HCL (Atarax) 25 mg tablet, Take 1 tablet (25 mg) by mouth every 8 hours if needed for itching., Disp: 90 tablet, Rfl: 2  •  pravastatin (Pravachol) 40 mg tablet, Take 1 tablet (40 mg) by mouth once daily at bedtime., Disp: , Rfl:      Last Recorded Vitals  /78 (BP Location: Left arm, Patient Position: Sitting, BP Cuff Size: Adult)   Pulse 67   Ht 1.676 m (5' 6\")   Wt 68.9 kg (152 lb)   SpO2 97%   BMI 24.53 kg/m²     Physical Exam:    Physical Exam  Vitals reviewed.   Constitutional:       Appearance: Normal appearance.   HENT:      Head: Normocephalic and atraumatic.      Mouth/Throat:      Mouth: Mucous membranes are moist.      Pharynx: Oropharynx is clear.   Cardiovascular:      Rate and Rhythm: Normal rate and regular rhythm.      Pulses: Normal pulses.      Heart sounds: Normal heart sounds.   Pulmonary:      Effort: Pulmonary effort is normal.      Breath sounds: Normal breath sounds.   Abdominal:      General: Bowel sounds are normal.      Palpations: Abdomen is soft.   Musculoskeletal:      Cervical back: Neck supple.   Skin:     General: Skin is warm and dry.   Neurological:      General: No focal deficit present.      Mental Status: She is alert.   Psychiatric:         Mood and Affect: Mood normal.         Behavior: Behavior normal.         Lab/Radiology/Diagnostic Review:    Labs    Lab Results   Component Value Date    GLUCOSE 63 (L) 12/28/2022    CALCIUM 10.0 12/28/2022     12/28/2022    K 4.6 12/28/2022    CO2 33 (H) 12/28/2022    CL 96 (L) " "12/28/2022    BUN 18 12/28/2022    CREATININE 0.58 12/28/2022       Lab Results   Component Value Date    WBC 11.0 12/28/2022    HGB 12.2 12/28/2022    HCT 37.3 12/28/2022    MCV 97 12/28/2022     (H) 12/28/2022       Lab Results   Component Value Date    CHOL 249 (H) 12/15/2022    CHOL 193 11/08/2021    CHOL 275 (H) 08/29/2019     Lab Results   Component Value Date    HDL 63.6 12/15/2022    HDL 34.2 (A) 11/08/2021    HDL 61.3 08/29/2019     No results found for: \"LDLCALC\"  Lab Results   Component Value Date    TRIG 76 12/15/2022    TRIG 134 11/08/2021    TRIG 460 (H) 08/29/2019     No components found for: \"CHOLHDL\"    Lab Results   Component Value Date    BNP 95 11/08/2021       Lab Results   Component Value Date    TSH 3.21 12/28/2022       Assessment:   Patient still has palpitations on a regular basis. Her event recorder was negative for arrhythmias. 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     Patient will stay on aspirin.  She will stop clopidogrel therapy.     Continue metoprolol succinate 50 mg a day. Attempts to increase metoprolol to 75 mg a day resulted in syncope.     Patient will stay on statin therapy.     Patient will follow up with us in 1 to 2 years or sooner with more problems..     Thank you for your visit today. Please contact our office with any questions.     Andres Atkins MD    "

## 2023-12-08 ENCOUNTER — APPOINTMENT (OUTPATIENT)
Dept: RADIOLOGY | Facility: HOSPITAL | Age: 58
End: 2023-12-08
Payer: COMMERCIAL

## 2023-12-08 ENCOUNTER — HOSPITAL ENCOUNTER (EMERGENCY)
Facility: HOSPITAL | Age: 58
Discharge: HOME | End: 2023-12-08
Payer: COMMERCIAL

## 2023-12-08 VITALS
SYSTOLIC BLOOD PRESSURE: 154 MMHG | BODY MASS INDEX: 23.63 KG/M2 | WEIGHT: 147 LBS | OXYGEN SATURATION: 96 % | DIASTOLIC BLOOD PRESSURE: 74 MMHG | TEMPERATURE: 97.9 F | HEIGHT: 66 IN | HEART RATE: 88 BPM | RESPIRATION RATE: 20 BRPM

## 2023-12-08 DIAGNOSIS — R10.84 GENERALIZED ABDOMINAL PAIN: Primary | ICD-10-CM

## 2023-12-08 DIAGNOSIS — K29.80 DUODENITIS: ICD-10-CM

## 2023-12-08 DIAGNOSIS — E83.42 HYPOMAGNESEMIA: ICD-10-CM

## 2023-12-08 LAB
ALBUMIN SERPL BCP-MCNC: 4.3 G/DL (ref 3.4–5)
ALP SERPL-CCNC: 85 U/L (ref 33–110)
ALT SERPL W P-5'-P-CCNC: 21 U/L (ref 7–45)
ANION GAP SERPL CALC-SCNC: 19 MMOL/L (ref 10–20)
APPEARANCE UR: CLEAR
AST SERPL W P-5'-P-CCNC: 27 U/L (ref 9–39)
BASOPHILS # BLD AUTO: 0.06 X10*3/UL (ref 0–0.1)
BASOPHILS NFR BLD AUTO: 0.5 %
BILIRUB SERPL-MCNC: 0.4 MG/DL (ref 0–1.2)
BILIRUB UR STRIP.AUTO-MCNC: NEGATIVE MG/DL
BUN SERPL-MCNC: 23 MG/DL (ref 6–23)
CALCIUM SERPL-MCNC: 10.5 MG/DL (ref 8.6–10.3)
CARDIAC TROPONIN I PNL SERPL HS: 3 NG/L (ref 0–13)
CHLORIDE SERPL-SCNC: 106 MMOL/L (ref 98–107)
CO2 SERPL-SCNC: 21 MMOL/L (ref 21–32)
COLOR UR: YELLOW
CREAT SERPL-MCNC: 0.74 MG/DL (ref 0.5–1.05)
EOSINOPHIL # BLD AUTO: 0.09 X10*3/UL (ref 0–0.7)
EOSINOPHIL NFR BLD AUTO: 0.7 %
ERYTHROCYTE [DISTWIDTH] IN BLOOD BY AUTOMATED COUNT: 12.3 % (ref 11.5–14.5)
FLUAV RNA RESP QL NAA+PROBE: NOT DETECTED
FLUBV RNA RESP QL NAA+PROBE: NOT DETECTED
GFR SERPL CREATININE-BSD FRML MDRD: >90 ML/MIN/1.73M*2
GLUCOSE SERPL-MCNC: 141 MG/DL (ref 74–99)
GLUCOSE UR STRIP.AUTO-MCNC: NEGATIVE MG/DL
HCT VFR BLD AUTO: 41.3 % (ref 36–46)
HGB BLD-MCNC: 13.9 G/DL (ref 12–16)
HOLD SPECIMEN: NORMAL
IMM GRANULOCYTES # BLD AUTO: 0.05 X10*3/UL (ref 0–0.7)
IMM GRANULOCYTES NFR BLD AUTO: 0.4 % (ref 0–0.9)
KETONES UR STRIP.AUTO-MCNC: NEGATIVE MG/DL
LACTATE SERPL-SCNC: 1.4 MMOL/L (ref 0.4–2)
LACTATE SERPL-SCNC: 3.4 MMOL/L (ref 0.4–2)
LEUKOCYTE ESTERASE UR QL STRIP.AUTO: NEGATIVE
LIPASE SERPL-CCNC: 54 U/L (ref 9–82)
LYMPHOCYTES # BLD AUTO: 2.01 X10*3/UL (ref 1.2–4.8)
LYMPHOCYTES NFR BLD AUTO: 16.1 %
MAGNESIUM SERPL-MCNC: 1.42 MG/DL (ref 1.6–2.4)
MCH RBC QN AUTO: 31.3 PG (ref 26–34)
MCHC RBC AUTO-ENTMCNC: 33.7 G/DL (ref 32–36)
MCV RBC AUTO: 93 FL (ref 80–100)
MONOCYTES # BLD AUTO: 0.82 X10*3/UL (ref 0.1–1)
MONOCYTES NFR BLD AUTO: 6.6 %
MUCOUS THREADS #/AREA URNS AUTO: ABNORMAL /LPF
NEUTROPHILS # BLD AUTO: 9.48 X10*3/UL (ref 1.2–7.7)
NEUTROPHILS NFR BLD AUTO: 75.7 %
NITRITE UR QL STRIP.AUTO: NEGATIVE
NRBC BLD-RTO: 0 /100 WBCS (ref 0–0)
PH UR STRIP.AUTO: 5 [PH]
PLATELET # BLD AUTO: 340 X10*3/UL (ref 150–450)
POTASSIUM SERPL-SCNC: 4.1 MMOL/L (ref 3.5–5.3)
PROT SERPL-MCNC: 7 G/DL (ref 6.4–8.2)
PROT UR STRIP.AUTO-MCNC: NEGATIVE MG/DL
RBC # BLD AUTO: 4.44 X10*6/UL (ref 4–5.2)
RBC # UR STRIP.AUTO: ABNORMAL /UL
RBC #/AREA URNS AUTO: ABNORMAL /HPF
SARS-COV-2 RNA RESP QL NAA+PROBE: NOT DETECTED
SODIUM SERPL-SCNC: 142 MMOL/L (ref 136–145)
SP GR UR STRIP.AUTO: 1.05
UROBILINOGEN UR STRIP.AUTO-MCNC: <2 MG/DL
WBC # BLD AUTO: 12.5 X10*3/UL (ref 4.4–11.3)
WBC #/AREA URNS AUTO: ABNORMAL /HPF

## 2023-12-08 PROCEDURE — 81003 URINALYSIS AUTO W/O SCOPE: CPT | Performed by: PHYSICIAN ASSISTANT

## 2023-12-08 PROCEDURE — 2500000001 HC RX 250 WO HCPCS SELF ADMINISTERED DRUGS (ALT 637 FOR MEDICARE OP): Performed by: PHYSICIAN ASSISTANT

## 2023-12-08 PROCEDURE — 83605 ASSAY OF LACTIC ACID: CPT | Performed by: PHYSICIAN ASSISTANT

## 2023-12-08 PROCEDURE — 2550000001 HC RX 255 CONTRASTS: Performed by: PHYSICIAN ASSISTANT

## 2023-12-08 PROCEDURE — 2500000005 HC RX 250 GENERAL PHARMACY W/O HCPCS: Performed by: PHYSICIAN ASSISTANT

## 2023-12-08 PROCEDURE — 74177 CT ABD & PELVIS W/CONTRAST: CPT | Mod: FR

## 2023-12-08 PROCEDURE — 36415 COLL VENOUS BLD VENIPUNCTURE: CPT | Performed by: PHYSICIAN ASSISTANT

## 2023-12-08 PROCEDURE — 74177 CT ABD & PELVIS W/CONTRAST: CPT | Mod: FOREIGN READ | Performed by: RADIOLOGY

## 2023-12-08 PROCEDURE — 80053 COMPREHEN METABOLIC PANEL: CPT | Performed by: PHYSICIAN ASSISTANT

## 2023-12-08 PROCEDURE — 2500000004 HC RX 250 GENERAL PHARMACY W/ HCPCS (ALT 636 FOR OP/ED): Performed by: PHYSICIAN ASSISTANT

## 2023-12-08 PROCEDURE — 84484 ASSAY OF TROPONIN QUANT: CPT | Performed by: PHYSICIAN ASSISTANT

## 2023-12-08 PROCEDURE — 96365 THER/PROPH/DIAG IV INF INIT: CPT | Mod: 59

## 2023-12-08 PROCEDURE — 96366 THER/PROPH/DIAG IV INF ADDON: CPT

## 2023-12-08 PROCEDURE — 87636 SARSCOV2 & INF A&B AMP PRB: CPT | Performed by: PHYSICIAN ASSISTANT

## 2023-12-08 PROCEDURE — 2500000004 HC RX 250 GENERAL PHARMACY W/ HCPCS (ALT 636 FOR OP/ED)

## 2023-12-08 PROCEDURE — 85025 COMPLETE CBC W/AUTO DIFF WBC: CPT | Performed by: PHYSICIAN ASSISTANT

## 2023-12-08 PROCEDURE — 96361 HYDRATE IV INFUSION ADD-ON: CPT

## 2023-12-08 PROCEDURE — 83690 ASSAY OF LIPASE: CPT | Performed by: PHYSICIAN ASSISTANT

## 2023-12-08 PROCEDURE — 99284 EMERGENCY DEPT VISIT MOD MDM: CPT

## 2023-12-08 PROCEDURE — 83735 ASSAY OF MAGNESIUM: CPT | Performed by: PHYSICIAN ASSISTANT

## 2023-12-08 PROCEDURE — 96375 TX/PRO/DX INJ NEW DRUG ADDON: CPT

## 2023-12-08 RX ORDER — ALBUTEROL SULFATE 0.83 MG/ML
2.5 SOLUTION RESPIRATORY (INHALATION) EVERY 4 HOURS PRN
COMMUNITY

## 2023-12-08 RX ORDER — MORPHINE SULFATE 4 MG/ML
INJECTION, SOLUTION INTRAMUSCULAR; INTRAVENOUS
Status: COMPLETED
Start: 2023-12-08 | End: 2023-12-08

## 2023-12-08 RX ORDER — CALCIUM CARBONATE 300MG(750)
400 TABLET,CHEWABLE ORAL DAILY
COMMUNITY

## 2023-12-08 RX ORDER — MULTIVIT-MIN/IRON FUM/FOLIC AC 7.5 MG-4
1 TABLET ORAL DAILY
COMMUNITY

## 2023-12-08 RX ORDER — FAMOTIDINE 10 MG/ML
20 INJECTION INTRAVENOUS ONCE
Status: DISCONTINUED | OUTPATIENT
Start: 2023-12-08 | End: 2023-12-08

## 2023-12-08 RX ORDER — MULTIVITAMIN/IRON/FOLIC ACID 18MG-0.4MG
1 TABLET ORAL DAILY
COMMUNITY

## 2023-12-08 RX ORDER — DICYCLOMINE HYDROCHLORIDE 20 MG/1
20 TABLET ORAL ONCE
Status: COMPLETED | OUTPATIENT
Start: 2023-12-08 | End: 2023-12-08

## 2023-12-08 RX ORDER — METOPROLOL SUCCINATE 50 MG/1
50 TABLET, EXTENDED RELEASE ORAL DAILY
COMMUNITY
End: 2024-05-09 | Stop reason: SDUPTHER

## 2023-12-08 RX ORDER — METOCLOPRAMIDE HYDROCHLORIDE 5 MG/ML
10 INJECTION INTRAMUSCULAR; INTRAVENOUS ONCE
Status: COMPLETED | OUTPATIENT
Start: 2023-12-08 | End: 2023-12-08

## 2023-12-08 RX ORDER — FAMOTIDINE 20 MG/1
20 TABLET, FILM COATED ORAL DAILY
COMMUNITY
End: 2023-12-08

## 2023-12-08 RX ORDER — MORPHINE SULFATE 4 MG/ML
4 INJECTION, SOLUTION INTRAMUSCULAR; INTRAVENOUS ONCE
Status: COMPLETED | OUTPATIENT
Start: 2023-12-08 | End: 2023-12-08

## 2023-12-08 RX ORDER — FAMOTIDINE 20 MG/1
20 TABLET, FILM COATED ORAL 2 TIMES DAILY
Qty: 14 TABLET | Refills: 0 | Status: SHIPPED | OUTPATIENT
Start: 2023-12-08 | End: 2024-02-08 | Stop reason: WASHOUT

## 2023-12-08 RX ORDER — MAGNESIUM SULFATE HEPTAHYDRATE 40 MG/ML
2 INJECTION, SOLUTION INTRAVENOUS ONCE
Status: COMPLETED | OUTPATIENT
Start: 2023-12-08 | End: 2023-12-08

## 2023-12-08 RX ORDER — CLOPIDOGREL BISULFATE 75 MG/1
75 TABLET ORAL DAILY
COMMUNITY
End: 2024-05-15

## 2023-12-08 RX ORDER — PERPHENAZINE 4 MG
1 TABLET ORAL DAILY
COMMUNITY

## 2023-12-08 RX ORDER — ACETAMINOPHEN 500 MG
2 TABLET ORAL DAILY
COMMUNITY
End: 2024-04-30 | Stop reason: ALTCHOICE

## 2023-12-08 RX ADMIN — SODIUM CHLORIDE 1000 ML: 9 INJECTION, SOLUTION INTRAVENOUS at 09:14

## 2023-12-08 RX ADMIN — MAGNESIUM SULFATE HEPTAHYDRATE 2 G: 40 INJECTION, SOLUTION INTRAVENOUS at 11:06

## 2023-12-08 RX ADMIN — DIPHENHYDRAMINE HYDROCHLORIDE AND LIDOCAINE HYDROCHLORIDE AND ALUMINUM HYDROXIDE AND MAGNESIUM HYDRO 10 ML: KIT at 12:21

## 2023-12-08 RX ADMIN — DICYCLOMINE HYDROCHLORIDE 20 MG: 20 TABLET ORAL at 12:59

## 2023-12-08 RX ADMIN — IOHEXOL 75 ML: 350 INJECTION, SOLUTION INTRAVENOUS at 10:45

## 2023-12-08 RX ADMIN — MORPHINE SULFATE 4 MG: 4 INJECTION, SOLUTION INTRAMUSCULAR; INTRAVENOUS at 09:15

## 2023-12-08 RX ADMIN — METOCLOPRAMIDE 10 MG: 5 INJECTION, SOLUTION INTRAMUSCULAR; INTRAVENOUS at 09:14

## 2023-12-08 ASSESSMENT — LIFESTYLE VARIABLES
EVER FELT BAD OR GUILTY ABOUT YOUR DRINKING: NO
HAVE PEOPLE ANNOYED YOU BY CRITICIZING YOUR DRINKING: NO
EVER HAD A DRINK FIRST THING IN THE MORNING TO STEADY YOUR NERVES TO GET RID OF A HANGOVER: NO
HAVE YOU EVER FELT YOU SHOULD CUT DOWN ON YOUR DRINKING: NO

## 2023-12-08 ASSESSMENT — PAIN SCALES - GENERAL: PAINLEVEL_OUTOF10: 8

## 2023-12-08 ASSESSMENT — COLUMBIA-SUICIDE SEVERITY RATING SCALE - C-SSRS
6. HAVE YOU EVER DONE ANYTHING, STARTED TO DO ANYTHING, OR PREPARED TO DO ANYTHING TO END YOUR LIFE?: NO
1. IN THE PAST MONTH, HAVE YOU WISHED YOU WERE DEAD OR WISHED YOU COULD GO TO SLEEP AND NOT WAKE UP?: NO
2. HAVE YOU ACTUALLY HAD ANY THOUGHTS OF KILLING YOURSELF?: NO

## 2023-12-08 ASSESSMENT — PAIN DESCRIPTION - FREQUENCY: FREQUENCY: CONSTANT/CONTINUOUS

## 2023-12-08 ASSESSMENT — PAIN DESCRIPTION - ONSET: ONSET: ONGOING

## 2023-12-08 ASSESSMENT — PAIN DESCRIPTION - DESCRIPTORS: DESCRIPTORS: SHARP

## 2023-12-08 ASSESSMENT — PAIN DESCRIPTION - PROGRESSION: CLINICAL_PROGRESSION: NOT CHANGED

## 2023-12-08 ASSESSMENT — PAIN DESCRIPTION - PAIN TYPE: TYPE: ACUTE PAIN

## 2023-12-08 ASSESSMENT — PAIN - FUNCTIONAL ASSESSMENT: PAIN_FUNCTIONAL_ASSESSMENT: 0-10

## 2023-12-08 ASSESSMENT — PAIN DESCRIPTION - LOCATION: LOCATION: ABDOMEN

## 2023-12-08 ASSESSMENT — PAIN DESCRIPTION - ORIENTATION: ORIENTATION: UPPER

## 2023-12-08 NOTE — DISCHARGE INSTRUCTIONS
Follow a bland diet for the next 2 to 3 days.  I would start with clear liquids and move onto toast or crackers.  Please see your PCP in 2 to 3 days for reassessment.  You were referred to gastroenterology on-call as you may need a scope.  Return to the ER immediately if your symptoms change or worsen.  Incidentally on your CAT scan there was a pulmonary nodule and will refer her to the lung nodule clinic for follow-up.

## 2023-12-08 NOTE — PROGRESS NOTES
Pharmacy Medication History Review    Nicolasa Burger is a 58 y.o. female admitted for No Principal Problem: There is no principal problem currently on the Problem List. Please update the Problem List and refresh.. Pharmacy reviewed the patient's [id number] medications and allergies for accuracy.    The list below reflectives the updated PTA list. Please review each medication in order reconciliation for additional clarification and justification.  (Not in a hospital admission)       The list below reflectives the updated allergy list. Please review each documented allergy for additional clarification and justification.  Allergies  Reviewed by Paris Campo CPhT on 12/8/2023   No Known Allergies         Below are additional concerns with the patient's PTA list.    See PTA med list    Paris Campo CPhT

## 2023-12-08 NOTE — ED PROVIDER NOTES
Limitations to History: clinical condition  External Records Reviewed  Independent Historians: none  Social determinants affecting care: none    HPI  Nicolasa Burger is a 58 y.o. female with past medical history of hypertension, CVA, anxiety, hypothyroidism, vitamin D deficiency, obstructive sleep apnea, COPD, who presents emergency department for assessment of abdominal pain, nausea, vomiting, and diarrhea that started at 3 AM today.  She has not able to keep anything down.  Pain is located to the epigastric region and feels sharp.  She has not had fever or chills.  She denies chest pains or shortness of breath.  She reports history of 4 C-sections, abdominoplasty, and appendectomy.  She is a poor historian due to her vomiting and abdominal pain.  Denies daily alcohol usage.  Denies marijuana usage.  She has no further complaints.    PMH  Past Medical History:   Diagnosis Date    Acute upper respiratory infection, unspecified 11/01/2021    Viral URI with cough    Effusion, unspecified knee 07/02/2021    Suprapatellar effusion of knee    Encounter for screening for malignant neoplasm of colon 05/06/2015    Screening for colon cancer    Pain in left knee 07/06/2021    Acute pain of left knee    Pain in left knee 09/07/2017    Left knee pain    Pain in left lower leg 07/02/2021    Pain of left calf    Pain in right finger(s)     Pain of right thumb    Personal history of other diseases of the nervous system and sense organs 06/28/2016    History of labyrinthitis    Personal history of other diseases of the nervous system and sense organs     History of migraine    Personal history of other diseases of the respiratory system 02/10/2021    History of pharyngitis    Personal history of other endocrine, nutritional and metabolic disease     History of goiter    Personal history of other infectious and parasitic diseases     History of varicella    Personal history of other infectious and parasitic diseases     History of  mumps    Personal history of other infectious and parasitic diseases 07/13/2021    History of herpes zoster    Personal history of other infectious and parasitic diseases 11/04/2021    History of viral infection    Personal history of other medical treatment     History of echocardiogram    Personal history of other medical treatment     History of cardiac monitoring    Personal history of other specified conditions 07/19/2021    History of right flank pain    Right upper quadrant pain 07/29/2015    RUQ abdominal pain    Segmental and somatic dysfunction of cervical region 12/12/2015    Segmental and somatic dysfunction of cervical region    Strain of other muscle(s) and tendon(s) at lower leg level, left leg, initial encounter 07/02/2021    Strain of calf muscle, left, initial encounter    Strain of unspecified muscle(s) and tendon(s) at lower leg level, left leg, initial encounter 07/02/2021    Strain of left knee, initial encounter    Unilateral primary osteoarthritis, left knee 10/20/2016    Osteoarthritis of left knee    Unspecified acute conjunctivitis, bilateral 07/12/2017    Acute conjunctivitis of both eyes, unspecified acute conjunctivitis type    Unspecified tear of unspecified meniscus, current injury, left knee, initial encounter 09/07/2017    Tear of meniscus of left knee    Urinary tract infection, site not specified 08/16/2021    Acute lower UTI    reviewed by myself.    Meds  Current Outpatient Medications   Medication Instructions    albuterol 90 mcg/actuation inhaler Inhale 2 puffs every 4 hours if needed.    albuterol 2.5 mg, nebulization, Every 4 hours PRN    aspirin 81 mg chewable tablet Chew 1 tablet (81 mg) once daily.    b complex 0.4 mg tablet 1 tablet, oral, Daily    cholecalciferol (Vitamin D-3) 5,000 Units tablet 2 tablets, oral, Daily    clopidogrel (PLAVIX) 75 mg, oral, Daily    collagen-biotin-ascorbic acid (Collagen 1500 Plus C) 500 mg-800 mcg- 50 mg capsule 1 capsule, oral, Daily     "famotidine (PEPCID) 20 mg, oral, 2 times daily    fluticasone (Flonase) 50 mcg/actuation nasal spray 2 sprays, Each Nostril, Daily PRN    guaiFENesin (MUCINEX) 600 mg, oral, Every 12 hours PRN    hydrOXYzine HCL (ATARAX) 25 mg, oral, Every 8 hours PRN    magnesium oxide (MAG-OX) 400 mg, oral, Daily    metoprolol succinate XL (TOPROL-XL) 50 mg, oral, Daily, Do not crush or chew.    multivitamin with minerals tablet 1 tablet, oral, Daily    primidone (MYSOLINE) 50 mg, oral, 2 times daily PRN    sertraline (ZOLOFT) 150 mg, oral, Daily       Allergies  No Known Allergies reviewed by myself.    SHx  Social History     Tobacco Use    Smoking status: Never    Smokeless tobacco: Never   Substance Use Topics    Alcohol use: Defer    Drug use: Never    reviewed by myself.      ------------------------------------------------------------------------------------------------------------------------------------------    /76   Pulse 86   Temp 36.5 °C (97.7 °F) (Temporal)   Resp 21   Ht 1.676 m (5' 6\")   Wt 66.7 kg (147 lb)   SpO2 94%   BMI 23.73 kg/m²     Physical Exam  Vitals and nursing note reviewed.   Constitutional:       General: She is in acute distress (due to pain and vomiting).      Appearance: Normal appearance. She is normal weight. She is not ill-appearing or toxic-appearing.   HENT:      Head: Normocephalic.      Nose: Nose normal.      Mouth/Throat:      Mouth: Mucous membranes are dry.   Eyes:      Extraocular Movements: Extraocular movements intact.      Conjunctiva/sclera: Conjunctivae normal.   Cardiovascular:      Rate and Rhythm: Normal rate and regular rhythm.   Pulmonary:      Effort: Pulmonary effort is normal.      Breath sounds: Normal breath sounds.   Abdominal:      General: Abdomen is flat. Bowel sounds are normal.      Palpations: Abdomen is soft.      Tenderness: There is abdominal tenderness in the epigastric area. There is no guarding or rebound.   Musculoskeletal:         General: " Normal range of motion.      Cervical back: Neck supple.   Skin:     General: Skin is warm and dry.   Neurological:      General: No focal deficit present.      Mental Status: She is alert and oriented to person, place, and time.   Psychiatric:         Attention and Perception: Attention normal.         Mood and Affect: Mood normal.          ------------------------------------------------------------------------------------------------------------------------------------------  Imaging  CT abdomen pelvis w IV contrast   Final Result   Diffuse mural thickening and mild fat stranding involving the proximal   duodenum consistent with duodenitis.   Small nodule seen at the right lung base.   Signed by Robinson Guy MD           Labs  Labs Reviewed   CBC WITH AUTO DIFFERENTIAL - Abnormal       Result Value    WBC 12.5 (*)     nRBC 0.0      RBC 4.44      Hemoglobin 13.9      Hematocrit 41.3      MCV 93      MCH 31.3      MCHC 33.7      RDW 12.3      Platelets 340      Neutrophils % 75.7      Immature Granulocytes %, Automated 0.4      Lymphocytes % 16.1      Monocytes % 6.6      Eosinophils % 0.7      Basophils % 0.5      Neutrophils Absolute 9.48 (*)     Immature Granulocytes Absolute, Automated 0.05      Lymphocytes Absolute 2.01      Monocytes Absolute 0.82      Eosinophils Absolute 0.09      Basophils Absolute 0.06     COMPREHENSIVE METABOLIC PANEL - Abnormal    Glucose 141 (*)     Sodium 142      Potassium 4.1      Chloride 106      Bicarbonate 21      Anion Gap 19      Urea Nitrogen 23      Creatinine 0.74      eGFR >90      Calcium 10.5 (*)     Albumin 4.3      Alkaline Phosphatase 85      Total Protein 7.0      AST 27      Bilirubin, Total 0.4      ALT 21     LACTATE - Abnormal    Lactate 3.4 (*)     Narrative:     Venipuncture immediately after or during the administration of Metamizole may lead to falsely low results. Testing should be performed immediately  prior to Metamizole dosing.   MAGNESIUM - Abnormal     Magnesium 1.42 (*)    URINALYSIS WITH REFLEX MICROSCOPIC AND CULTURE - Abnormal    Color, Urine Yellow      Appearance, Urine Clear      Specific Gravity, Urine 1.053 (*)     pH, Urine 5.0      Protein, Urine NEGATIVE      Glucose, Urine NEGATIVE      Blood, Urine MODERATE (2+) (*)     Ketones, Urine NEGATIVE      Bilirubin, Urine NEGATIVE      Urobilinogen, Urine <2.0      Nitrite, Urine NEGATIVE      Leukocyte Esterase, Urine NEGATIVE     URINALYSIS MICROSCOPIC WITH REFLEX CULTURE - Abnormal    WBC, Urine NONE      RBC, Urine 6-10 (*)     Mucus, Urine 1+     LIPASE - Normal    Lipase 54      Narrative:     Venipuncture immediately after or during the administration of Metamizole may lead to falsely low results. Testing should be performed immediately prior to Metamizole dosing.   TROPONIN I, HIGH SENSITIVITY - Normal    Troponin I, High Sensitivity 3      Narrative:     Less than 99th percentile of normal range cutoff-  Female and children under 18 years old <14 ng/L; Male <21 ng/L: Negative  Repeat testing should be performed if clinically indicated.     Female and children under 18 years old 14-50 ng/L; Male 21-50 ng/L:  Consistent with possible cardiac damage and possible increased clinical   risk. Serial measurements may help to assess extent of myocardial damage.     >50 ng/L: Consistent with cardiac damage, increased clinical risk and  myocardial infarction. Serial measurements may help assess extent of   myocardial damage.      NOTE: Children less than 1 year old may have higher baseline troponin   levels and results should be interpreted in conjunction with the overall   clinical context.     NOTE: Troponin I testing is performed using a different   testing methodology at [facility] than at other   [facility]. Direct result comparisons should only   be made within the same method.   SARS-COV-2 PCR, SYMPTOMATIC - Normal    Coronavirus 2019, PCR Not Detected      Narrative:     This assay  has received FDA Emergency Use Authorization (EUA) and is only authorized for the duration of time that circumstances exist to justify the authorization of the emergency use of in vitro diagnostic tests for the detection of SARS-CoV-2 virus and/or diagnosis of COVID-19 infection under section 564(b)(1) of the Act, 21 U.S.C. 360bbb-3(b)(1). This assay is an in vitro diagnostic nucleic acid amplification test for the qualitative detection of SARS-CoV-2 from nasopharyngeal specimens and has been validated for use at [facility]. Negative results do not preclude COVID-19 infections and should not be used as the sole basis for diagnosis, treatment, or other management decisions.     INFLUENZA A AND B PCR - Normal    Flu A Result Not Detected      Flu B Result Not Detected      Narrative:     This assay is an in vitro diagnostic multiplex nucleic acid amplification test for the detection and discrimination of Influenza A & B from nasopharyngeal specimens, and has been validated for use at [facility]. Negative results do not preclude Influenza A/B infections, and should not be used as the sole basis for diagnosis, treatment, or other management decisions. If Influenza A/B and RSV PCR results are negative, testing for Parainfluenza virus, Adenovirus and Metapneumovirus is routinely performed for [facility] pediatric oncology and intensive care inpatients, and is available on other patients by placing an add-on request.   LACTATE - Normal    Lactate 1.4      Narrative:     Venipuncture immediately after or during the administration of Metamizole may lead to falsely low results. Testing should be performed immediately  prior to Metamizole dosing.   URINALYSIS WITH REFLEX MICROSCOPIC AND CULTURE    Narrative:     The following orders were created for panel order Urinalysis with Reflex Microscopic and Culture.  Procedure                               Abnormality         Status                      ---------                               -----------         ------                     Urinalysis with Reflex M...[[id number]]  Abnormal            Final result               Extra Urine Gray Tube[[id number]]                            In process                   Please view results for these tests on the individual orders.   EXTRA URINE GRAY TUBE        ED Course  Diagnoses as of 12/08/23 1348   Generalized abdominal pain   Duodenitis   Hypomagnesemia        Medical Decision Making: She appears very uncomfortable.  She is actively retching and vomiting.  She was placed in a continuous cardiac and pulse ox monitor.    Differential diagnoses considered: Pancreatitis, cholecystitis, gastritis, ACS, diverticulitis, others    Medications given: IV fluids, IV Reglan, IV morphine    EKG interpreted by myself: Normal sinus rhythm.  Ventricular rate 68 bpm.  No acute ST elevations or depressions.  Inverted T wave in V1.  QT of 426 and QTc of 452.    Reviewed the labs from today.  Slight leukocytosis at 12.5.  H&H stable.  Glucose 141.  BUN and creatinine normal.  Potassium is normal.  Magnesium is low at 1.42.  COVID-negative.  Influenza negative.  Lipase normal.  Lactic elevated 3.4.  Troponin negative.  Patient ordered 2 g of IV magnesium due to hypomagnesemia.  CT of the abdomen pelvis obtained showing acute duodenitis but otherwise unremarkable.  Pulmonary nodule also noted.  Patient was reassessed and resting much more comfortably after Reglan and morphine.  I discussed with her about the workup today.  She would prefer to go home if possible.  She does have some burning in her throat and I will provide her with BMX and Bentyl.  This will also be a good PO challenge.  She was able to hold this down without any further episodes of vomiting.  Urinalysis was obtained and is negative.  Repeat lactic has improved after IV fluids.      I discussed with the patient that I recommend she follow-up with gastroenterology  due to the duodenitis on the scan today.  She will be started on Pepcid.  I recommended she follow a clear liquid diet for the next 24 to 48 hours and then follow the BRAT diet.  Incidentally on the CT scan is a pulmonary nodule and she was referred to pulmonary nodule clinic.  She is to return to the ER immediately if her symptoms change or worsen.  She verbalized understanding and agreed to plan of care.  She was discharged home in stable condition.    Diagnosis: Abdominal pain, duodenitis, hypomagnesemia  Plan: Discharge           Nigel Olson PA-C  12/08/23 1342

## 2023-12-11 ENCOUNTER — HOSPITAL ENCOUNTER (OUTPATIENT)
Dept: CARDIOLOGY | Facility: HOSPITAL | Age: 58
Discharge: HOME | End: 2023-12-11
Payer: COMMERCIAL

## 2023-12-11 PROCEDURE — 93005 ELECTROCARDIOGRAM TRACING: CPT

## 2023-12-19 ENCOUNTER — APPOINTMENT (OUTPATIENT)
Dept: PRIMARY CARE | Facility: CLINIC | Age: 58
End: 2023-12-19
Payer: COMMERCIAL

## 2023-12-19 ENCOUNTER — TELEPHONE (OUTPATIENT)
Dept: PRIMARY CARE | Facility: CLINIC | Age: 58
End: 2023-12-19
Payer: COMMERCIAL

## 2023-12-19 NOTE — TELEPHONE ENCOUNTER
Patient left voicemail asking to cancel appointment  for [facility] on 12/19 and to call to get her rescheduled.  Office called patient back to reschedule, patient did not   phone and unable to leave a voicemail due to being full.

## 2023-12-26 NOTE — PROGRESS NOTES
{JTVisit:12730}    Subjective   COVID-19 Infection Date:  11/1/2021 (sx: fever, cough, SOB, fatigue, pain on breathing, chest pain, loss of smell/taste, runny nose, headache, muscle pain, diarrhea, confusion, brain fog - hospitalized 11/5-17 for acute hypoxic respiratory failure in setting of COVID-19 pna requiring Airvo and BiPAP, treated with antibiotics, Dexamethasone, Remdesivir, and Baricitinib. Hospital course complicated by acute CVA that was treated with TPA, discharged home on 2L O2)  August 2022 (sx: mils symptoms, fatigue, body aches, congestion - no hospitalization, treated with Paxlovid)    COVID-19 vaccine status: not vaccinated    Occupation: part-time prior to COVID, now unable to work due to poor health     Current Providers: PCP Dr. Andres Landeros, Cardiology Dr. Andres Black, Neurology Dr. Param Amanda and CNP [place], Pulmonary Dr. Jenn Ceballos, Cardiology NILS Davis and Dr. Luque, Psychiatry [facility], Ortho Dr. Flores    Survey scores: 09/2022 -> 03/2023 -> 07/2023  PHQ-9: 10 -> 6 -> 8  PEDRO-7: 6 -> 10 -> 12  Sleep Wellness: 9 -> 9 -> 10  FSS average: 2.111 -> 6 -> 2.889  Modified ECog average: 2 -> 3.25  MOCA: 21/30 (09/2022) -> 17/22 (09/2023)  Overall Health: 54 -> 42 -> 52 -> 40    58 y.o. female with h/o COVID-19 in November 2021, anxiety, ADHD, HTN, HLD, PEDRO, COPD, chronic respiratory failure on 2L O2, prior CVA, [facility] with c/o     Shortness of breath,   cough,   rhinitis,   palpitations,   dizziness,   cognitive and memory changes,   fatigue,   rashes,   pain,   numbness nd tingling,   tremors,   anxiety,   shingles.    No changes in how she is feeling, more brain fog  Left leg is still very tender, arms are in pain  Hands are problematic  EMG showed that she has carpal tunnel, now getting shooting pains through arms  Balance is off as always  Breathing has improved, still tight when feeling stressed  Stress levels have not been  good, has to get back to work but cannot work, will try for disability   is terminally ill  Allergies are bad in the fall, using Flonase is not helping enough  Does not want to take anything that makes her sleepy, Zyrtec daily  Has always been a great sleeper but lately has been waking up frequently, tossing and turning  Has been using CPAP but struggling with medical supply company  Can tell that she has more pep in her step  Still on supplemental O2  Afraid to have a stroke again due to high stress level  Has not been able to see therapist because she is at [facility] every day, cannot even get 10 minutes  No more passing out since decreasing metoprolol dose again  Palpitations are still present, following with cardiology  Dizziness still present  Rashes are still present as well  Numbness and tingling and muscle cramps are still present  Taking magnesium supplement has helped  Tremors ongoing, not as bad  Headaches resolved  Wants to see rheumatology at [facility]  Seeing dermatologist for rashes, was prescribed topical that seems to help a little  Thinks she has shingles in the back, burns and itches, hurts when touched    ROS from surveys: (  )      Relevant prior healthcare visits:  -11/2021 ED for slurred speech 11/23  -05/2022 Psychiatry for anxiety, increased Zoloft, continue hydroxyzine  -09/2022 Pulmonary switched to levalbuterol, PFTs and 6MWD pending, lung parenchyma looks much improved  -11/2022 ED for fall with injury to head and shoulder, work-up reassuring  -12/2022 admitted 12/14-22 for acute pancreatitis, experienced delirium during hospitalization, discharged with [facility]  -01/2023 Sleep Medicine ordered titration study  -03/2023 Neurology notes stable form neurological standpoint, if numbness in wrists does not improve with wrist splints will obtain EMG, continue supplemental O2 and move up sleep study  -04/2023 PT eval notes decreased endurance, strength, balance, activity tolerance  -04/2023 OT  eval notes decreased strength, balance, activity tolerance, cognitive and executive functioning  -05/2023 Ortho for Kenalog injection for bilateral trigger thumbs  -11/2023 Cardiology notes continued palpitations with normal event monitoring, continue aspirin and metoprolol, stop plavix  -12/2023 ED for abdominal pain and n/v, CT scan shows duodenitis, recommends outpatient GI follow-up    Relevant prior diagnostic studies:  -11/2021 CTA shows no PE, bilateral extensive airspace opacities   -04/2022 MRI brain shows focal demyelination in the subcortical white matter of the right frontal lobe at the vertex corresponds to abnormal CT density seen in November 2021  -05/2022 MBSE shows ?  -06/2022 HRCT shows post-infectious changes of COVID-19 pna with subpleural atelectasis/scarring. There is a mosaic perfusion pattern which may reflect component of small airway small vessel disease  -06/2022 echocardiogram shows LVEF normal, PFO closure device well placed with negative bubble study  -06/2022 21-day MAYLIN shows sinus rhythm with average HR of 85 bpm, one episode of sinus tachycardia with HR of 126  -10/2022 HSAT shows severe PEDRO with O2 nadri 68.9%, baseline SPO2 89.7%  -11/2022 CT C-spine shows multilevel degenerative spinal canal or neural foramina stenosis  -12/2022 CT A/P shows acute pancreatitis, hepatic steatosis  -12/2022 CXR clear  -03/2023 PAP titration study  -03/2023 CT brain unremarkable  -06/2023 echocardiogram shows LVEF 60-65%, impaired relaxation pattern of LV diastolic filling, PFO closure device noted  -12/2023 CT abdomen/pelvis shows diffuse mural thickening and mild fat stranding involving the proximal duodenum consistent with duodenitis, small nodule seen at the right lung base    Relevant prior laboratory values, unremarkable unless noted:  -04/2022 elevated uric acid, DERREK positive, ESR, CK, CMP with elevated liver enzymes, CRP, elevated Anti RNP  -12/2022 Vitamin B1, Tryptase, Vitamin B6, Vitamin  B2, Vitamin B12, Vitamin D, Folate, AM Cortisol, Ferritin, Magnesium 1.52, CMP, Iron studies, TSH, HbA1c, CBC/D  -12/2023 lactate, mag 1.42, troponin, lipase, cmp, CBC/D with WBC 12.5    Exercise routine: 20 minutes twice per week  Diet:  Weight hx: pre-COVID-19 146 lbs -> post-COVID-19 124 lbs -> 130 lbs -> 144lbs  Substance use: former tobacco use, 3-4 servings ETOH 2-4 times per month  Social: , 4 kids (3 in 20s, one 17yo)      Current Outpatient Medications:     albuterol 2.5 mg /3 mL (0.083 %) nebulizer solution, Take 3 mL (2.5 mg) by nebulization every 4 hours if needed for wheezing., Disp: , Rfl:     albuterol 90 mcg/actuation inhaler, Inhale 2 puffs every 4 hours if needed., Disp: , Rfl:     aspirin 81 mg chewable tablet, Chew 1 tablet (81 mg) once daily., Disp: , Rfl:     b complex 0.4 mg tablet, Take 1 tablet by mouth once daily., Disp: , Rfl:     cholecalciferol (Vitamin D-3) 5,000 Units tablet, Take 2 tablets (10,000 Units) by mouth once daily., Disp: , Rfl:     clopidogrel (Plavix) 75 mg tablet, Take 1 tablet (75 mg) by mouth once daily., Disp: , Rfl:     collagen-biotin-ascorbic acid (Collagen 1500 Plus C) 500 mg-800 mcg- 50 mg capsule, Take 1 capsule by mouth once daily., Disp: , Rfl:     famotidine (Pepcid) 20 mg tablet, Take 1 tablet (20 mg) by mouth 2 times a day for 7 days., Disp: 14 tablet, Rfl: 0    fluticasone (Flonase) 50 mcg/actuation nasal spray, Administer 2 sprays into each nostril once daily as needed., Disp: , Rfl:     guaiFENesin (Mucinex) 600 mg 12 hr tablet, Take 1 tablet (600 mg) by mouth every 12 hours if needed., Disp: , Rfl:     hydrOXYzine HCL (Atarax) 25 mg tablet, Take 1 tablet (25 mg) by mouth every 8 hours if needed for itching. (Patient taking differently: Take 1 tablet (25 mg) by mouth once daily at bedtime.), Disp: 90 tablet, Rfl: 2    magnesium oxide (Mag-Ox) 400 mg tablet, Take 1 tablet (400 mg) by mouth once daily., Disp: , Rfl:     metoprolol succinate XL  (Toprol-XL) 50 mg 24 hr tablet, Take 1 tablet (50 mg) by mouth once daily. Do not crush or chew., Disp: , Rfl:     multivitamin with minerals tablet, Take 1 tablet by mouth once daily., Disp: , Rfl:     primidone (Mysoline) 50 mg tablet, Take 1 tablet (50 mg) by mouth 2 times a day as needed., Disp: , Rfl:     sertraline (Zoloft) 100 mg tablet, Take 1.5 tablets (150 mg) by mouth once daily., Disp: 135 tablet, Rfl: 3    Past Medical History:   Diagnosis Date    Acute upper respiratory infection, unspecified 11/01/2021    Viral URI with cough    Effusion, unspecified knee 07/02/2021    Suprapatellar effusion of knee    Encounter for screening for malignant neoplasm of colon 05/06/2015    Screening for colon cancer    Pain in left knee 07/06/2021    Acute pain of left knee    Pain in left knee 09/07/2017    Left knee pain    Pain in left lower leg 07/02/2021    Pain of left calf    Pain in right finger(s)     Pain of right thumb    Personal history of other diseases of the nervous system and sense organs 06/28/2016    History of labyrinthitis    Personal history of other diseases of the nervous system and sense organs     History of migraine    Personal history of other diseases of the respiratory system 02/10/2021    History of pharyngitis    Personal history of other endocrine, nutritional and metabolic disease     History of goiter    Personal history of other infectious and parasitic diseases     History of varicella    Personal history of other infectious and parasitic diseases     History of mumps    Personal history of other infectious and parasitic diseases 07/13/2021    History of herpes zoster    Personal history of other infectious and parasitic diseases 11/04/2021    History of viral infection    Personal history of other medical treatment     History of echocardiogram    Personal history of other medical treatment     History of cardiac monitoring    Personal history of other specified conditions  2021    History of right flank pain    Right upper quadrant pain 2015    RUQ abdominal pain    Segmental and somatic dysfunction of cervical region 2015    Segmental and somatic dysfunction of cervical region    Strain of other muscle(s) and tendon(s) at lower leg level, left leg, initial encounter 2021    Strain of calf muscle, left, initial encounter    Strain of unspecified muscle(s) and tendon(s) at lower leg level, left leg, initial encounter 2021    Strain of left knee, initial encounter    Unilateral primary osteoarthritis, left knee 10/20/2016    Osteoarthritis of left knee    Unspecified acute conjunctivitis, bilateral 2017    Acute conjunctivitis of both eyes, unspecified acute conjunctivitis type    Unspecified tear of unspecified meniscus, current injury, left knee, initial encounter 2017    Tear of meniscus of left knee    Urinary tract infection, site not specified 2021    Acute lower UTI       Past Surgical History:   Procedure Laterality Date    APPENDECTOMY  2015    Appendectomy    BELT ABDOMINOPLASTY  2015    Abdominoplasty     SECTION, CLASSIC  2015     Section    CT ANGIO NECK  2021    CT NECK ANGIO W AND WO IV CONTRAST 2021 DOCTOR OFFICE LEGACY    CT HEAD ANGIO W AND WO IV CONTRAST  2021    CT HEAD ANGIO W AND WO IV CONTRAST 2021 DOCTOR OFFICE LEGACY    ENDOMETRIAL ABLATION  2015    Gynecologic Services Thermal Endometrial Ablation    OTHER SURGICAL HISTORY  2015    Breast Surgery Enlargement Procedure    OTHER SURGICAL HISTORY  2021    Knee replacement    OTHER SURGICAL HISTORY  2022    Patent foramen ovale repair       Family History   Problem Relation Name Age of Onset    Anxiety disorder Mother      Allergies Father          seasonal    Alcohol abuse Father          severe    Breast cancer Sister         Objective   There were no vitals taken for this  visit.    Physical Exam    Assessment/Plan   {Assess/PlanSmartLinks:63884}

## 2023-12-26 NOTE — ASSESSMENT & PLAN NOTE
Current Assessment and Plan:      Previous Assessment and Plan:     09/2023:  Shortness of breath, cough, rhinitis, palpitations, dizziness, cognitive and memory changes, fatigue, rashes, pain, numbness nd tingling, tremors, anxiety, shingles  -continue close follow-up with your specialists and their recommendations  -referral to MELA Tirado for therapy, her office will call you to set up an appoinment  -referral to Dr. Carter with pain management  -schedule a visit with Rheumatologist Dr. Marcie Tyson at [facility] [phone]  -let me know when you are ready to restart PT and OT  -I sent lidocaine ointment to your pharmacy to help with shingles pain, you can also take Tylenol, follow-up with PCP as needed    04/2023: shortness of breath, cough, rhinitis, palpitations, dizziness, syncopal episodes, difficulty with speech, brain fog, rashes, skin sensitivity, joint pain, numbness and tingling, tremors, fatigue, headaches, anxiety  .wendy  -decrease Metoprolol dose to 1 tablet per day and keep a log of your blood pressure  -inform your PCP, cardiologist, and neurologist of recurrent syncopal episodes and low BP, let them know that I had you decrease the metoprolol dose back down to 1 tablet (50mg) sustained release once daily  -for anxiety, referral to Long COVID support group  -schedule with Rheumatologist dr. Marcie Tyson at [facility] [phone]  -referral to SLP for brain fog and speech  -referral to vestibular rehab for dizziness and imbalance  .con  -stop taking Benadryl, for allergies use Flonase daily and OTC non-drowsy medication such as Claritin or Zyrtec    01/2023  Shortness of breath, cough, palpitations, dizziness, difficulty with swallowing and speech, skin changes, joint pain, numbness and tingling, tremors, fatigue, brain fog, headaches, parosmia, GI complaints, anxiety  -follow-up with referral to sleep medicine, .wendy  -schedule with Rheumatologist dr. Marcie Tyson at [facility]  [phone]  -follow closely with your specialists including GI, cardiology, neurology, and pulmonary  -continue with rehabilitation: PT, OT, SLP  -continue closely with psychology  .con  .brain    09/2022:  Shortness of breath, cough, rhinitis, palpitations, dizziness, trouble swallowing, difficulty with speech, rash, joint pain, numbness and tingling, tremors, fatigue, brain fog, headaches, parosmia, GI complaints, anxiety  .blood  .hsat  -referral to Rheumatology Dr. Marcie [facility] at [facility] [phone]  -add Azelastine nose spray to Flonase  -try Benefiber and Probiotic for GI complaints, if no improvement then we will consider referral to Gastroenterology  -Nutritional Roots 42 Billion CFU Probiotics ([employer] or [employer])- recommend to take before bed  -follow-up with your current specialists and their recommendations  -we will consider rehabilitation with SLP and PT down the road  .con  -continue to work closely with Psychiatry to help with your anxiety  .psychology  .fog  .brain  .vax  Sleep medicine

## 2023-12-27 ENCOUNTER — APPOINTMENT (OUTPATIENT)
Dept: OTHER | Facility: CLINIC | Age: 58
End: 2023-12-27
Payer: COMMERCIAL

## 2023-12-28 ENCOUNTER — DOCUMENTATION (OUTPATIENT)
Dept: OTHER | Facility: CLINIC | Age: 58
End: 2023-12-28
Payer: COMMERCIAL

## 2023-12-28 ENCOUNTER — TELEPHONE (OUTPATIENT)
Dept: OTHER | Facility: CLINIC | Age: 58
End: 2023-12-28
Payer: COMMERCIAL

## 2023-12-28 ENCOUNTER — APPOINTMENT (OUTPATIENT)
Dept: OTHER | Facility: CLINIC | Age: 58
End: 2023-12-28
Payer: COMMERCIAL

## 2023-12-28 NOTE — PROGRESS NOTES
Patient canceled her appointment with the  [facility] without 24hr advance notice, below note is incomplete:    Subjective   COVID-19 Infection Date:  11/1/2021 (sx: fever, cough, SOB, fatigue, pain on breathing, chest pain, loss of smell/taste, runny nose, headache, muscle pain, diarrhea, confusion, brain fog - hospitalized 11/5-17 for acute hypoxic respiratory failure in setting of COVID-19 pna requiring Airvo and BiPAP, treated with antibiotics, Dexamethasone, Remdesivir, and Baricitinib. Hospital course complicated by acute CVA that was treated with TPA, discharged home on 2L O2)  August 2022 (sx: mils symptoms, fatigue, body aches, congestion - no hospitalization, treated with Paxlovid)    COVID-19 vaccine status: not vaccinated    Occupation: part-time prior to COVID, now unable to work due to poor health     Current Providers: PCP Dr. Andres Landeros, Cardiology Dr. Andres Black, Neurology Dr. Param Amanda and GARRET Mcgee, Pulmonary Dr. Jenn Ceballos, Cardiology PA Ryan and Dr. Luque, Psychiatry [facility], Ortho Dr. Flores    Survey scores: 09/2022 -> 03/2023 -> 07/2023  PHQ-9: 10 -> 6 -> 8  PEDRO-7: 6 -> 10 -> 12  Sleep Wellness: 9 -> 9 -> 10  FSS average: 2.111 -> 6 -> 2.889  Modified ECog average: 2 -> 3.25  MOCA: 21/30 (09/2022) -> 17/22 (09/2023)  Overall Health: 54 -> 42 -> 52 -> 40    58 y.o. female with h/o COVID-19 in November 2021, anxiety, ADHD, HTN, HLD, PEDRO, COPD, chronic respiratory failure on 2L O2, prior CVA, presents [facility] with c/o     Shortness of breath,   cough,   rhinitis,   palpitations,   dizziness,   cognitive and memory changes,   fatigue,   rashes,   pain,   numbness nd tingling,   tremors,   anxiety,   shingles.    No changes in how she is feeling, more brain fog  Left leg is still very tender, arms are in pain  Hands are problematic  EMG showed that she has carpal tunnel, now getting shooting pains through arms  Balance is  off as always  Breathing has improved, still tight when feeling stressed  Stress levels have not been good, has to get back to work but cannot work, will try for disability   is terminally ill  Allergies are bad in the fall, using Flonase is not helping enough  Does not want to take anything that makes her sleepy, Zyrtec daily  Has always been a great sleeper but lately has been waking up frequently, tossing and turning  Has been using CPAP but struggling with medical supply company  Can tell that she has more pep in her step  Still on supplemental O2  Afraid to have a stroke again due to high stress level  Has not been able to see therapist because she is at [facility] every day, cannot even get 10 minutes  No more passing out since decreasing metoprolol dose again  Palpitations are still present, following with cardiology  Dizziness still present  Rashes are still present as well  Numbness and tingling and muscle cramps are still present  Taking magnesium supplement has helped  Tremors ongoing, not as bad  Headaches resolved  Wants to see rheumatology at [facility]  Seeing dermatologist for rashes, was prescribed topical that seems to help a little  Thinks she has shingles in the back, burns and itches, hurts when touched    ROS from surveys: (  )      Relevant prior healthcare visits:  -11/2021 ED for slurred speech 11/23  -05/2022 Psychiatry for anxiety, increased Zoloft, continue hydroxyzine  -09/2022 Pulmonary switched to levalbuterol, PFTs and 6MWD pending, lung parenchyma looks much improved  -11/2022 ED for fall with injury to head and shoulder, work-up reassuring  -12/2022 admitted 12/14-22 for acute pancreatitis, experienced delirium during hospitalization, discharged with [facility]  -01/2023 Sleep Medicine ordered titration study  -03/2023 Neurology notes stable form neurological standpoint, if numbness in wrists does not improve with wrist splints will obtain EMG, continue supplemental O2 and move up sleep  study  -04/2023 PT eval notes decreased endurance, strength, balance, activity tolerance  -04/2023 OT eval notes decreased strength, balance, activity tolerance, cognitive and executive functioning  -05/2023 Ortho for Kenalog injection for bilateral trigger thumbs  -11/2023 Cardiology notes continued palpitations with normal event monitoring, continue aspirin and metoprolol, stop plavix  -12/2023 ED for abdominal pain and n/v, CT scan shows duodenitis, recommends outpatient GI follow-up    Relevant prior diagnostic studies:  -11/2021 CTA shows no PE, bilateral extensive airspace opacities   -04/2022 MRI brain shows focal demyelination in the subcortical white matter of the right frontal lobe at the vertex corresponds to abnormal CT density seen in November 2021  -05/2022 MBSE shows ?  -06/2022 HRCT shows post-infectious changes of COVID-19 pna with subpleural atelectasis/scarring. There is a mosaic perfusion pattern which may reflect component of small airway small vessel disease  -06/2022 echocardiogram shows LVEF normal, PFO closure device well placed with negative bubble study  -06/2022 21-day MAYLIN shows sinus rhythm with average HR of 85 bpm, one episode of sinus tachycardia with HR of 126  -10/2022 HSAT shows severe PEDRO with O2 nadri 68.9%, baseline SPO2 89.7%  -11/2022 CT C-spine shows multilevel degenerative spinal canal or neural foramina stenosis  -12/2022 CT A/P shows acute pancreatitis, hepatic steatosis  -12/2022 CXR clear  -03/2023 PAP titration study  -03/2023 CT brain unremarkable  -06/2023 echocardiogram shows LVEF 60-65%, impaired relaxation pattern of LV diastolic filling, PFO closure device noted  -12/2023 CT abdomen/pelvis shows diffuse mural thickening and mild fat stranding involving the proximal duodenum consistent with duodenitis, small nodule seen at the right lung base    Relevant prior laboratory values, unremarkable unless noted:  -04/2022 elevated uric acid, DERREK positive, ESR, CK, CMP  with elevated liver enzymes, CRP, elevated Anti RNP  -12/2022 Vitamin B1, Tryptase, Vitamin B6, Vitamin B2, Vitamin B12, Vitamin D, Folate, AM Cortisol, Ferritin, Magnesium 1.52, CMP, Iron studies, TSH, HbA1c, CBC/D  -12/2023 lactate, mag 1.42, troponin, lipase, cmp, CBC/D with WBC 12.5    Exercise routine: 20 minutes twice per week  Diet:  Weight hx: pre-COVID-19 146 lbs -> post-COVID-19 124 lbs -> 130 lbs -> 144lbs  Substance use: former tobacco use, 3-4 servings ETOH 2-4 times per month  Social: , 4 kids (3 in 20s, one 17yo)      Current Outpatient Medications:     albuterol 2.5 mg /3 mL (0.083 %) nebulizer solution, Take 3 mL (2.5 mg) by nebulization every 4 hours if needed for wheezing., Disp: , Rfl:     albuterol 90 mcg/actuation inhaler, Inhale 2 puffs every 4 hours if needed., Disp: , Rfl:     aspirin 81 mg chewable tablet, Chew 1 tablet (81 mg) once daily., Disp: , Rfl:     b complex 0.4 mg tablet, Take 1 tablet by mouth once daily., Disp: , Rfl:     cholecalciferol (Vitamin D-3) 5,000 Units tablet, Take 2 tablets (10,000 Units) by mouth once daily., Disp: , Rfl:     clopidogrel (Plavix) 75 mg tablet, Take 1 tablet (75 mg) by mouth once daily., Disp: , Rfl:     collagen-biotin-ascorbic acid (Collagen 1500 Plus C) 500 mg-800 mcg- 50 mg capsule, Take 1 capsule by mouth once daily., Disp: , Rfl:     famotidine (Pepcid) 20 mg tablet, Take 1 tablet (20 mg) by mouth 2 times a day for 7 days., Disp: 14 tablet, Rfl: 0    fluticasone (Flonase) 50 mcg/actuation nasal spray, Administer 2 sprays into each nostril once daily as needed., Disp: , Rfl:     guaiFENesin (Mucinex) 600 mg 12 hr tablet, Take 1 tablet (600 mg) by mouth every 12 hours if needed., Disp: , Rfl:     hydrOXYzine HCL (Atarax) 25 mg tablet, Take 1 tablet (25 mg) by mouth every 8 hours if needed for itching. (Patient taking differently: Take 1 tablet (25 mg) by mouth once daily at bedtime.), Disp: 90 tablet, Rfl: 2    magnesium oxide (Mag-Ox) 400  mg tablet, Take 1 tablet (400 mg) by mouth once daily., Disp: , Rfl:     metoprolol succinate XL (Toprol-XL) 50 mg 24 hr tablet, Take 1 tablet (50 mg) by mouth once daily. Do not crush or chew., Disp: , Rfl:     multivitamin with minerals tablet, Take 1 tablet by mouth once daily., Disp: , Rfl:     primidone (Mysoline) 50 mg tablet, Take 1 tablet (50 mg) by mouth 2 times a day as needed., Disp: , Rfl:     sertraline (Zoloft) 100 mg tablet, Take 1.5 tablets (150 mg) by mouth once daily., Disp: 135 tablet, Rfl: 3    Past Medical History:   Diagnosis Date    Acute upper respiratory infection, unspecified 11/01/2021    Viral URI with cough    Effusion, unspecified knee 07/02/2021    Suprapatellar effusion of knee    Encounter for screening for malignant neoplasm of colon 05/06/2015    Screening for colon cancer    Pain in left knee 07/06/2021    Acute pain of left knee    Pain in left knee 09/07/2017    Left knee pain    Pain in left lower leg 07/02/2021    Pain of left calf    Pain in right finger(s)     Pain of right thumb    Personal history of other diseases of the nervous system and sense organs 06/28/2016    History of labyrinthitis    Personal history of other diseases of the nervous system and sense organs     History of migraine    Personal history of other diseases of the respiratory system 02/10/2021    History of pharyngitis    Personal history of other endocrine, nutritional and metabolic disease     History of goiter    Personal history of other infectious and parasitic diseases     History of varicella    Personal history of other infectious and parasitic diseases     History of mumps    Personal history of other infectious and parasitic diseases 07/13/2021    History of herpes zoster    Personal history of other infectious and parasitic diseases 11/04/2021    History of viral infection    Personal history of other medical treatment     History of echocardiogram    Personal history of other medical  treatment     History of cardiac monitoring    Personal history of other specified conditions 2021    History of right flank pain    Right upper quadrant pain 2015    RUQ abdominal pain    Segmental and somatic dysfunction of cervical region 2015    Segmental and somatic dysfunction of cervical region    Strain of other muscle(s) and tendon(s) at lower leg level, left leg, initial encounter 2021    Strain of calf muscle, left, initial encounter    Strain of unspecified muscle(s) and tendon(s) at lower leg level, left leg, initial encounter 2021    Strain of left knee, initial encounter    Unilateral primary osteoarthritis, left knee 10/20/2016    Osteoarthritis of left knee    Unspecified acute conjunctivitis, bilateral 2017    Acute conjunctivitis of both eyes, unspecified acute conjunctivitis type    Unspecified tear of unspecified meniscus, current injury, left knee, initial encounter 2017    Tear of meniscus of left knee    Urinary tract infection, site not specified 2021    Acute lower UTI       Past Surgical History:   Procedure Laterality Date    APPENDECTOMY  2015    Appendectomy    BELT ABDOMINOPLASTY  2015    Abdominoplasty     SECTION, CLASSIC  2015     Section    CT ANGIO NECK  2021    CT NECK ANGIO W AND WO IV CONTRAST 2021 DOCTOR OFFICE LEGACY    CT HEAD ANGIO W AND WO IV CONTRAST  2021    CT HEAD ANGIO W AND WO IV CONTRAST 2021 DOCTOR OFFICE LEGACY    ENDOMETRIAL ABLATION  2015    Gynecologic Services Thermal Endometrial Ablation    OTHER SURGICAL HISTORY  2015    Breast Surgery Enlargement Procedure    OTHER SURGICAL HISTORY  2021    Knee replacement    OTHER SURGICAL HISTORY  2022    Patent foramen ovale repair       Family History   Problem Relation Name Age of Onset    Anxiety disorder Mother      Allergies Father          seasonal    Alcohol abuse Father           severe    Breast cancer Sister         Objective   There were no vitals taken for this visit.    Physical Exam    Assessment/Plan

## 2023-12-28 NOTE — ASSESSMENT & PLAN NOTE
Current Assessment and Plan:      Previous Assessment and Plan:     09/2023:  Shortness of breath, cough, rhinitis, palpitations, dizziness, cognitive and memory changes, fatigue, rashes, pain, numbness nd tingling, tremors, anxiety, shingles  -continue close follow-up with your specialists and their recommendations  -referral to MELA Tirado for therapy, her office will call you to set up an appoinment  -referral to Dr. Carter with pain management  -schedule a visit with Rheumatologist Dr. Marcie Tyson at [facility] [phone]  -let me know when you are ready to restart PT and OT  -I sent lidocaine ointment to your pharmacy to help with shingles pain, you can also take Tylenol, follow-up with PCP as needed    04/2023: shortness of breath, cough, rhinitis, palpitations, dizziness, syncopal episodes, difficulty with speech, brain fog, rashes, skin sensitivity, joint pain, numbness and tingling, tremors, fatigue, headaches, anxiety  .wendy  -decrease Metoprolol dose to 1 tablet per day and keep a log of your blood pressure  -inform your PCP, cardiologist, and neurologist of recurrent syncopal episodes and low BP, let them know that I had you decrease the metoprolol dose back down to 1 tablet (50mg) sustained release once daily  -for anxiety, referral to Long COVID support group  -schedule with Rheumatologist dr. Marcie Tyson at [facility] [phone]  -referral to SLP for brain fog and speech  -referral to vestibular rehab for dizziness and imbalance  .con  -stop taking Benadryl, for allergies use Flonase daily and OTC non-drowsy medication such as Claritin or Zyrtec    01/2023  Shortness of breath, cough, palpitations, dizziness, difficulty with swallowing and speech, skin changes, joint pain, numbness and tingling, tremors, fatigue, brain fog, headaches, parosmia, GI complaints, anxiety  -follow-up with referral to sleep medicine, .wendy  -schedule with Rheumatologist dr. Marcie Tyson at [facility]  [phone]  -follow closely with your specialists including GI, cardiology, neurology, and pulmonary  -continue with rehabilitation: PT, OT, SLP  -continue closely with psychology  .con  .brain    09/2022:  Shortness of breath, cough, rhinitis, palpitations, dizziness, trouble swallowing, difficulty with speech, rash, joint pain, numbness and tingling, tremors, fatigue, brain fog, headaches, parosmia, GI complaints, anxiety  .blood  .hsat  -referral to Rheumatology Dr. Marcie Tyson at [facility] [phone]  -add Azelastine nose spray to Flonase  -try Benefiber and Probiotic for GI complaints, if no improvement then we will consider referral to Gastroenterology  -Nutritional Roots 42 Billion CFU Probiotics (Heinens or [employer])- recommend to take before bed  -follow-up with your current specialists and their recommendations  -we will consider rehabilitation with SLP and PT down the road  .con  -continue to work closely with Psychiatry to help with your anxiety  .psychology  .fog  .brain  .vax  Sleep medicine

## 2024-01-02 ENCOUNTER — APPOINTMENT (OUTPATIENT)
Dept: GASTROENTEROLOGY | Facility: CLINIC | Age: 59
End: 2024-01-02
Payer: COMMERCIAL

## 2024-01-07 LAB
ATRIAL RATE: 68 BPM
P AXIS: 52 DEGREES
P OFFSET: 203 MS
P ONSET: 142 MS
PR INTERVAL: 150 MS
Q ONSET: 217 MS
QRS COUNT: 11 BEATS
QRS DURATION: 90 MS
QT INTERVAL: 426 MS
QTC CALCULATION(BAZETT): 452 MS
QTC FREDERICIA: 444 MS
R AXIS: 72 DEGREES
T AXIS: 66 DEGREES
T OFFSET: 430 MS
VENTRICULAR RATE: 68 BPM

## 2024-01-09 NOTE — ASSESSMENT & PLAN NOTE
Current Assessment and Plan:      Previous Assessment and Plan:     09/2023:  Shortness of breath, cough, rhinitis, palpitations, dizziness, cognitive and memory changes, fatigue, rashes, pain, numbness nd tingling, tremors, anxiety, shingles  -continue close follow-up with your specialists and their recommendations  -referral to MELA Tirado for therapy, her office will call you to set up an appoinment  -referral to Dr. Carter with pain management  -schedule a visit with Rheumatologist Dr. Marcie Tyson at [facility] [phone]  -let me know when you are ready to restart PT and OT  -I sent lidocaine ointment to your pharmacy to help with shingles pain, you can also take Tylenol, follow-up with PCP as needed    04/2023: shortness of breath, cough, rhinitis, palpitations, dizziness, syncopal episodes, difficulty with speech, brain fog, rashes, skin sensitivity, joint pain, numbness and tingling, tremors, fatigue, headaches, anxiety  .wendy  -decrease Metoprolol dose to 1 tablet per day and keep a log of your blood pressure  -inform your PCP, cardiologist, and neurologist of recurrent syncopal episodes and low BP, let them know that I had you decrease the metoprolol dose back down to 1 tablet (50mg) sustained release once daily  -for anxiety, referral to Long COVID support group  -schedule with Rheumatologist dr. Marcie Tyson at [facility] [phone]  -referral to SLP for brain fog and speech  -referral to vestibular rehab for dizziness and imbalance  .con  -stop taking Benadryl, for allergies use Flonase daily and OTC non-drowsy medication such as Claritin or Zyrtec    01/2023  Shortness of breath, cough, palpitations, dizziness, difficulty with swallowing and speech, skin changes, joint pain, numbness and tingling, tremors, fatigue, brain fog, headaches, parosmia, GI complaints, anxiety  -follow-up with referral to sleep medicine, .wendy  -schedule with Rheumatologist dr. Marcie Tyson at [facility]  [phone]  -follow closely with your specialists including GI, cardiology, neurology, and pulmonary  -continue with rehabilitation: PT, OT, SLP  -continue closely with psychology  .con  .brain    09/2022:  Shortness of breath, cough, rhinitis, palpitations, dizziness, trouble swallowing, difficulty with speech, rash, joint pain, numbness and tingling, tremors, fatigue, brain fog, headaches, parosmia, GI complaints, anxiety  .blood  .hsat  -referral to Rheumatology Dr. Marcie Tyson at [facility] [phone]  -add Azelastine nose spray to Flonase  -try Benefiber and Probiotic for GI complaints, if no improvement then we will consider referral to Gastroenterology  -Nutritional Roots 42 Billion CFU Probiotics ([employer] or [employer])- recommend to take before bed  -follow-up with your current specialists and their recommendations  -we will consider rehabilitation with SLP and PT down the road  .con  -continue to work closely with Psychiatry to help with your anxiety  .psychology  .fog  .brain  .vax  Sleep medicine

## 2024-01-09 NOTE — PROGRESS NOTES
{JTVisit:20381}    Subjective   COVID-19 Infection Date:  11/1/2021 (sx: fever, cough, SOB, fatigue, pain on breathing, chest pain, loss of smell/taste, runny nose, headache, muscle pain, diarrhea, confusion, brain fog - hospitalized 11/5-17 for acute hypoxic respiratory failure in setting of COVID-19 pna requiring Airvo and BiPAP, treated with antibiotics, Dexamethasone, Remdesivir, and Baricitinib. Hospital course complicated by acute CVA that was treated with TPA, discharged home on 2L O2)  August 2022 (sx: mils symptoms, fatigue, body aches, congestion - no hospitalization, treated with Paxlovid)    COVID-19 vaccine status: not vaccinated    Occupation: part-time prior to COVID, now unable to work due to poor health     Current Providers: PCP Dr. Andres Landeros, Cardiology Dr. Andres Black, Neurology Dr. Param Amanda and CNP [place], Pulmonary Dr. Jenn Ceballos, Cardiology NILS Davis and Dr. Luque, Psychiatry [facility], Ortho Dr. Flores    Survey scores: 09/2022 -> 03/2023 -> 07/2023  PHQ-9: 10 -> 6 -> 8  PEDRO-7: 6 -> 10 -> 12  Sleep Wellness: 9 -> 9 -> 10  FSS average: 2.111 -> 6 -> 2.889  Modified ECog average: 2 -> 3.25  MOCA: 21/30 (09/2022) -> 17/22 (09/2023)  Overall Health: 54 -> 42 -> 52 -> 40    58 y.o. female with h/o COVID-19 in November 2021, anxiety, ADHD, HTN, HLD, PEDRO, COPD, chronic respiratory failure on 2L O2, prior CVA, [facility] with c/o     Shortness of breath,   cough,   rhinitis,   palpitations,   dizziness,   cognitive and memory changes,   fatigue,   rashes,   pain,   numbness nd tingling,   tremors,   anxiety,   shingles.    No changes in how she is feeling, more brain fog  Left leg is still very tender, arms are in pain  Hands are problematic  EMG showed that she has carpal tunnel, now getting shooting pains through arms  Balance is off as always  Breathing has improved, still tight when feeling stressed  Stress levels have not been  good, has to get back to work but cannot work, will try for disability   is terminally ill  Allergies are bad in the fall, using Flonase is not helping enough  Does not want to take anything that makes her sleepy, Zyrtec daily  Has always been a great sleeper but lately has been waking up frequently, tossing and turning  Has been using CPAP but struggling with medical supply company  Can tell that she has more pep in her step  Still on supplemental O2  Afraid to have a stroke again due to high stress level  Has not been able to see therapist because she is at [facility] every day, cannot even get 10 minutes  No more passing out since decreasing metoprolol dose again  Palpitations are still present, following with cardiology  Dizziness still present  Rashes are still present as well  Numbness and tingling and muscle cramps are still present  Taking magnesium supplement has helped  Tremors ongoing, not as bad  Headaches resolved  W[facility] to see rheumatology at [facility]  Seeing dermatologist for rashes, was prescribed topical that seems to help a little  Thinks she has shingles in the back, burns and itches, hurts when touched    ROS from surveys: (  )      Relevant prior healthcare visits:  -11/2021 ED for slurred speech 11/23  -05/2022 Psychiatry for anxiety, increased Zoloft, continue hydroxyzine  -09/2022 Pulmonary switched to levalbuterol, PFTs and 6MWD pending, lung parenchyma looks much improved  -11/2022 ED for fall with injury to head and shoulder, work-up reassuring  -12/2022 admitted 12/14-22 for acute pancreatitis, experienced delirium during hospitalization, discharged with [facility]  -01/2023 Sleep Medicine ordered titration study  -03/2023 Neurology notes stable form neurological standpoint, if numbness in wrists does not improve with wrist splints will obtain EMG, continue supplemental O2 and move up sleep study  -04/2023 PT eval notes decreased endurance, strength, balance, activity tolerance  -04/2023 OT  eval notes decreased strength, balance, activity tolerance, cognitive and executive functioning  -05/2023 Ortho for Kenalog injection for bilateral trigger thumbs  -11/2023 Cardiology notes continued palpitations with normal event monitoring, continue aspirin and metoprolol, stop plavix  -12/2023 ED for abdominal pain and n/v, CT scan shows duodenitis, recommends outpatient GI follow-up    Relevant prior diagnostic studies:  -11/2021 CTA shows no PE, bilateral extensive airspace opacities   -04/2022 MRI brain shows focal demyelination in the subcortical white matter of the right frontal lobe at the vertex corresponds to abnormal CT density seen in November 2021  -05/2022 MBSE shows ?  -06/2022 HRCT shows post-infectious changes of COVID-19 pna with subpleural atelectasis/scarring. There is a mosaic perfusion pattern which may reflect component of small airway small vessel disease  -06/2022 echocardiogram shows LVEF normal, PFO closure device well placed with negative bubble study  -06/2022 21-day MAYLIN shows sinus rhythm with average HR of 85 bpm, one episode of sinus tachycardia with HR of 126  -10/2022 HSAT shows severe PEDRO with O2 nadri 68.9%, baseline SPO2 89.7%  -11/2022 CT C-spine shows multilevel degenerative spinal canal or neural foramina stenosis  -12/2022 CT A/P shows acute pancreatitis, hepatic steatosis  -12/2022 CXR clear  -03/2023 PAP titration study  -03/2023 CT brain unremarkable  -06/2023 echocardiogram shows LVEF 60-65%, impaired relaxation pattern of LV diastolic filling, PFO closure device noted  -12/2023 CT abdomen/pelvis shows diffuse mural thickening and mild fat stranding involving the proximal duodenum consistent with duodenitis, small nodule seen at the right lung base    Relevant prior laboratory values, unremarkable unless noted:  -04/2022 elevated uric acid, DERREK positive, ESR, CK, CMP with elevated liver enzymes, CRP, elevated Anti RNP  -12/2022 Vitamin B1, Tryptase, Vitamin B6, Vitamin  B2, Vitamin B12, Vitamin D, Folate, AM Cortisol, Ferritin, Magnesium 1.52, CMP, Iron studies, TSH, HbA1c, CBC/D  -12/2023 lactate, mag 1.42, troponin, lipase, cmp, CBC/D with WBC 12.5    Exercise routine: 20 minutes twice per week  Diet:  Weight hx: pre-COVID-19 146 lbs -> post-COVID-19 124 lbs -> 130 lbs -> 144lbs  Substance use: former tobacco use, 3-4 servings ETOH 2-4 times per month  Social: , 4 kids (3 in 20s, one 19yo)      Current Outpatient Medications:     albuterol 2.5 mg /3 mL (0.083 %) nebulizer solution, Take 3 mL (2.5 mg) by nebulization every 4 hours if needed for wheezing., Disp: , Rfl:     albuterol 90 mcg/actuation inhaler, Inhale 2 puffs every 4 hours if needed., Disp: , Rfl:     aspirin 81 mg chewable tablet, Chew 1 tablet (81 mg) once daily., Disp: , Rfl:     b complex 0.4 mg tablet, Take 1 tablet by mouth once daily., Disp: , Rfl:     cholecalciferol (Vitamin D-3) 5,000 Units tablet, Take 2 tablets (10,000 Units) by mouth once daily., Disp: , Rfl:     clopidogrel (Plavix) 75 mg tablet, Take 1 tablet (75 mg) by mouth once daily., Disp: , Rfl:     collagen-biotin-ascorbic acid (Collagen 1500 Plus C) 500 mg-800 mcg- 50 mg capsule, Take 1 capsule by mouth once daily., Disp: , Rfl:     famotidine (Pepcid) 20 mg tablet, Take 1 tablet (20 mg) by mouth 2 times a day for 7 days., Disp: 14 tablet, Rfl: 0    fluticasone (Flonase) 50 mcg/actuation nasal spray, Administer 2 sprays into each nostril once daily as needed., Disp: , Rfl:     guaiFENesin (Mucinex) 600 mg 12 hr tablet, Take 1 tablet (600 mg) by mouth every 12 hours if needed., Disp: , Rfl:     hydrOXYzine HCL (Atarax) 25 mg tablet, Take 1 tablet (25 mg) by mouth every 8 hours if needed for itching. (Patient taking differently: Take 1 tablet (25 mg) by mouth once daily at bedtime.), Disp: 90 tablet, Rfl: 2    magnesium oxide (Mag-Ox) 400 mg tablet, Take 1 tablet (400 mg) by mouth once daily., Disp: , Rfl:     metoprolol succinate XL  (Toprol-XL) 50 mg 24 hr tablet, Take 1 tablet (50 mg) by mouth once daily. Do not crush or chew., Disp: , Rfl:     multivitamin with minerals tablet, Take 1 tablet by mouth once daily., Disp: , Rfl:     primidone (Mysoline) 50 mg tablet, Take 1 tablet (50 mg) by mouth 2 times a day as needed., Disp: , Rfl:     sertraline (Zoloft) 100 mg tablet, Take 1.5 tablets (150 mg) by mouth once daily., Disp: 135 tablet, Rfl: 3    Past Medical History:   Diagnosis Date    Acute upper respiratory infection, unspecified 11/01/2021    Viral URI with cough    Effusion, unspecified knee 07/02/2021    Suprapatellar effusion of knee    Encounter for screening for malignant neoplasm of colon 05/06/2015    Screening for colon cancer    Pain in left knee 07/06/2021    Acute pain of left knee    Pain in left knee 09/07/2017    Left knee pain    Pain in left lower leg 07/02/2021    Pain of left calf    Pain in right finger(s)     Pain of right thumb    Personal history of other diseases of the nervous system and sense organs 06/28/2016    History of labyrinthitis    Personal history of other diseases of the nervous system and sense organs     History of migraine    Personal history of other diseases of the respiratory system 02/10/2021    History of pharyngitis    Personal history of other endocrine, nutritional and metabolic disease     History of goiter    Personal history of other infectious and parasitic diseases     History of varicella    Personal history of other infectious and parasitic diseases     History of mumps    Personal history of other infectious and parasitic diseases 07/13/2021    History of herpes zoster    Personal history of other infectious and parasitic diseases 11/04/2021    History of viral infection    Personal history of other medical treatment     History of echocardiogram    Personal history of other medical treatment     History of cardiac monitoring    Personal history of other specified conditions  2021    History of right flank pain    Right upper quadrant pain 2015    RUQ abdominal pain    Segmental and somatic dysfunction of cervical region 2015    Segmental and somatic dysfunction of cervical region    Strain of other muscle(s) and tendon(s) at lower leg level, left leg, initial encounter 2021    Strain of calf muscle, left, initial encounter    Strain of unspecified muscle(s) and tendon(s) at lower leg level, left leg, initial encounter 2021    Strain of left knee, initial encounter    Unilateral primary osteoarthritis, left knee 10/20/2016    Osteoarthritis of left knee    Unspecified acute conjunctivitis, bilateral 2017    Acute conjunctivitis of both eyes, unspecified acute conjunctivitis type    Unspecified tear of unspecified meniscus, current injury, left knee, initial encounter 2017    Tear of meniscus of left knee    Urinary tract infection, site not specified 2021    Acute lower UTI       Past Surgical History:   Procedure Laterality Date    APPENDECTOMY  2015    Appendectomy    BELT ABDOMINOPLASTY  2015    Abdominoplasty     SECTION, CLASSIC  2015     Section    CT ANGIO NECK  2021    CT NECK ANGIO W AND WO IV CONTRAST 2021 DOCTOR OFFICE LEGACY    CT HEAD ANGIO W AND WO IV CONTRAST  2021    CT HEAD ANGIO W AND WO IV CONTRAST 2021 DOCTOR OFFICE LEGACY    ENDOMETRIAL ABLATION  2015    Gynecologic Services Thermal Endometrial Ablation    OTHER SURGICAL HISTORY  2015    Breast Surgery Enlargement Procedure    OTHER SURGICAL HISTORY  2021    Knee replacement    OTHER SURGICAL HISTORY  2022    Patent foramen ovale repair       Family History   Problem Relation Name Age of Onset    Anxiety disorder Mother      Allergies Father          seasonal    Alcohol abuse Father          severe    Breast cancer Sister         Objective   There were no vitals taken for this  visit.    Physical Exam    Assessment/Plan   {Assess/PlanSmartLinks:78164}

## 2024-01-10 ENCOUNTER — TELEPHONE (OUTPATIENT)
Dept: OTHER | Facility: CLINIC | Age: 59
End: 2024-01-10
Payer: COMMERCIAL

## 2024-01-10 NOTE — TELEPHONE ENCOUNTER
PT would like to return to the [facility] after her rheumatology appointment. 1/12 appointment rescheduled for 2/6.    Patient received instructions on how to complete required forms. PT voiced understanding.

## 2024-01-10 NOTE — TELEPHONE ENCOUNTER
Voicemail left for patient in regards of required surveys that have not been completed. Direct contact number left so patient can reach me without difficulty for assistance. Deadline of Noon given.

## 2024-01-12 ENCOUNTER — APPOINTMENT (OUTPATIENT)
Dept: OTHER | Facility: CLINIC | Age: 59
End: 2024-01-12
Payer: COMMERCIAL

## 2024-02-02 NOTE — ASSESSMENT & PLAN NOTE
02/2024: shortness of breath, cough, rhinitis, palpitations, dizziness, brain fog, fatigue, chronic pain, numbness and tingling, tremors  -Vitamin B12 is low, I prescribed a daily supplement for this   -repeat bloodwork: DERREK  -referral to Dr. Carter with pain management  -prescription Azelastine nose spray to be added to daily Flonase and Zyrtec, if no improvement in this will refer to ENT  -dizziness exercises  -let me know when you are ready for referral to Physical Therapy and Speech Therapy  -discuss sleep apnea treatment with Dr. Herron        09/2023:  Shortness of breath, cough, rhinitis, palpitations, dizziness, cognitive and memory changes, fatigue, rashes, pain, numbness nd tingling, tremors, anxiety, shingles  -continue close follow-up with your specialists and their recommendations  -referral to MELA Tirado for therapy, her office will call you to set up an appoinment  -referral to Dr. Carter with pain management  -schedule a visit with Rheumatologist Dr. Marcie Tyson at [facility] [phone]  -let me know when you are ready to restart PT and OT  -I sent lidocaine ointment to your pharmacy to help with shingles pain, you can also take Tylenol, follow-up with PCP as needed    04/2023: shortness of breath, cough, rhinitis, palpitations, dizziness, syncopal episodes, difficulty with speech, brain fog, rashes, skin sensitivity, joint pain, numbness and tingling, tremors, fatigue, headaches, anxiety  .wendy  -decrease Metoprolol dose to 1 tablet per day and keep a log of your blood pressure  -inform your PCP, cardiologist, and neurologist of recurrent syncopal episodes and low BP, let them know that I had you decrease the metoprolol dose back down to 1 tablet (50mg) sustained release once daily  -for anxiety, referral to Long COVID support group  -schedule with Rheumatologist dr. Marcie Tyson at [facility] [phone]  -referral to SLP for brain fog and speech  -referral to vestibular rehab for  dizziness and imbalance  .con  -stop taking Benadryl, for allergies use Flonase daily and OTC non-drowsy medication such as Claritin or Zyrtec    01/2023  Shortness of breath, cough, palpitations, dizziness, difficulty with swallowing and speech, skin changes, joint pain, numbness and tingling, tremors, fatigue, brain fog, headaches, parosmia, GI complaints, anxiety  -follow-up with referral to sleep medicine, .wendy  -schedule with Rheumatologist dr. Marcie Tyson at [facility] [phone]  -follow closely with your specialists including GI, cardiology, neurology, and pulmonary  -continue with rehabilitation: PT, OT, SLP  -continue closely with psychology  .con  .brain    09/2022:  Shortness of breath, cough, rhinitis, palpitations, dizziness, trouble swallowing, difficulty with speech, rash, joint pain, numbness and tingling, tremors, fatigue, brain fog, headaches, parosmia, GI complaints, anxiety  .blood  .hsat  -referral to Rheumatology Dr. Marcie Tyson at [facility] [phone]  -add Azelastine nose spray to Flonase  -try Benefiber and Probiotic for GI complaints, if no improvement then we will consider referral to Gastroenterology  -Nutritional Roots 42 Billion CFU Probiotics ([employer] or [employer])- recommend to take before bed  -follow-up with your current specialists and their recommendations  -we will consider rehabilitation with SLP and PT down the road  .con  -continue to work closely with Psychiatry to help with your anxiety  .psychology  .fog  .brain  .vax  Sleep medicine

## 2024-02-02 NOTE — PROGRESS NOTES
[facility] In-Person     Subjective   COVID-19 Infection Date:  11/1/2021 (sx: fever, cough, SOB, fatigue, pain on breathing, chest pain, loss of smell/taste, runny nose, headache, muscle pain, diarrhea, confusion, brain fog - hospitalized 11/5-17 for acute hypoxic respiratory failure in setting of COVID-19 pna requiring Airvo and BiPAP, treated with antibiotics, Dexamethasone, Remdesivir, and Baricitinib. Hospital course complicated by acute CVA that was treated with TPA, discharged home on 2L O2)  August 2022 (sx: mils symptoms, fatigue, body aches, congestion - no hospitalization, treated with Paxlovid)    COVID-19 vaccine status: not vaccinated    Occupation: part-time prior to COVID, now unable to work due to poor health     Current Providers: PCP Dr. Andres Landeros, Cardiology Dr. Andres Black, Neurology Dr. Param Amanda and CNP Everardo, Pulmonary Dr. Jenn Ceballos, Cardiology PA Ryan and Dr. uLque, Psychiatry [facility], Ortho Dr. Flores, Rheumatology Dr. Cee [facility]    Survey scores: 09/2022 -> 03/2023 -> 07/2023 -> 02/2024  PHQ-9: 10 -> 6 -> 8 -> 3  PEDRO-7: 6 -> 10 -> 12 -> 5  Sleep Wellness: 9 -> 9 -> 10 -> 10  FSS average: 2.111 -> 6 -> 2.889 -> 1.777  Modified ECog average: 2 -> 3.25 -> 1.5  MOCA: 21/30 (09/2022) -> 17/22 (09/2023)  Overall Health: 54 -> 42 -> 52 -> 40 -> 75    58 y.o. female with h/o COVID-19 in November 2021, anxiety, ADHD, HTN, HLD, PEDRO, COPD, chronic respiratory failure on 2L O2, prior CVA, presents [facility] with c/o shortness of breath, cough, rhinitis, palpitations, dizziness, brain fog, fatigue, chronic pain, numbness and tingling, tremors     Has been feeling OK  Saw rheumatologist at [facility], felt it was a not helpful, was given Flexeril and that isn't helping  Fingers get stuck, thumbs  Mood is much better,  got a liver transplant in October  Trying to get back on track financially, doing OK otherwise, still stress a  lot  Teenager at home and 8 grandchildren  Declines Psychology referral at this time  Breathing has improved since some of the stress is alleviated  Still getting tight chest at times and then does breathing exercises  Always has a cough, relates to post-nasal drainage  Takes Zyrtec and Flonase intermittently  Palpitations are ongoing, cardiology evaluation is reassuring  Dizziness is still not good, when rolling over in bed she is dizzy and also when looking up too fast  Holding off on PT until she has reached her deductible  Brain fog has not been good either, not terrible but still forgetful, not worsening   Fatigued a lot, does not feel rested in the morning, uses O2 at night but not CPAP  Didn't have a mask that works for her, MSC was very difficult to work with  No more rashes  Still having tremors, comes and goes, improving, can put make-up on better    Relevant prior healthcare visits:  -11/2021 ED for slurred speech 11/23  -05/2022 Psychiatry for anxiety, increased Zoloft, continue hydroxyzine  -09/2022 Pulmonary switched to levalbuterol, PFTs and 6MWD pending, lung parenchyma looks much improved  -11/2022 ED for fall with injury to head and shoulder, work-up reassuring  -12/2022 admitted 12/14-22 for acute pancreatitis, experienced delirium during hospitalization, discharged w/ [facility]  -01/2023 Sleep Medicine ordered titration study  -03/2023 Neurology notes stable form neurological standpoint, if numbness in wrists does not improve with wrist splints will obtain EMG, continue supplemental O2 and move up sleep study  -04/2023 PT eval notes decreased endurance, strength, balance, activity tolerance  -04/2023 OT eval notes decreased strength, balance, activity tolerance, cognitive and executive functioning  -05/2023 Ortho for Kenalog injection for bilateral trigger thumbs  -11/2023 Cardiology notes continued palpitations with normal event monitoring, cont aspirin metoprolol, stop plavix  -12/2023 ED for  abdominal pain and n/v, CT scan shows duodenitis, recommends outpatient GI follow-up  -01/2024 Rheumatology for pain in both hands due to CTS and bilateral thumb trigger finger, no evidence of inflammatory arthritis on exam, pain in legs likely chronic pain from PASC vs fibromyalgia, ordered labs and flexeril    Relevant prior diagnostic studies:  -11/2021 CTA shows no PE, bilateral extensive airspace opacities   -04/2022 MRI brain shows focal demyelination in the subcortical white matter of the right frontal lobe at the vertex corresponds to abnormal CT density seen in November 2021  -05/2022 MBSE shows ?  -06/2022 HRCT shows post-infectious changes of COVID-19 pna with subpleural atelectasis/scarring. There is a mosaic perfusion pattern which may reflect component of small airway small vessel disease  -06/2022 echocardiogram shows LVEF normal, PFO closure device well placed with negative bubble study  -06/2022 21-day MAYLIN shows sinus rhythm with average HR of 85 bpm, episode sinus tachycardia with HR of 126  -10/2022 HSAT shows severe PEDRO with O2 nadri 68.9%, baseline SPO2 89.7%  -11/2022 CT C-spine shows multilevel degenerative spinal canal or neural foramina stenosis  -12/2022 CT A/P shows acute pancreatitis, hepatic steatosis  -12/2022 CXR clear  -03/2023 PAP titration study  -03/2023 CT brain unremarkable  -06/2023 echocardiogram shows LVEF 60-65%, impaired relaxation pattern of LV diastolic filling, PFO closure device noted  -12/2023 CT abdomen/pelvis shows diffuse mural thickening and mild fat stranding involving the proximal duodenum consistent with duodenitis, small nodule seen at the right lung base    Relevant prior laboratory values, unremarkable unless noted:  -04/2022 elevated uric acid, DERREK positive, ESR, CK, CMP with elevated liver enzymes, CRP, elevated Anti RNP  -12/2022 Vitamin B1, Tryptase, Vitamin B6, Vitamin B2, Vitamin B12, Vitamin D, Folate, AM Cortisol, Ferritin, Magnesium 1.52, CMP, Iron  studies, TSH, HbA1c, CBC/D  -12/2023 lactate, mag 1.42, troponin, lipase, cmp, CBC/D with WBC 12.5    Exercise routine: 20 minutes three times per week  Diet:  Weight hx: pre-COVID-19 146 lbs -> post-COVID-19 124 lbs -> 130 lbs -> 144lbs -> 147 lbs  Substance use: former tobacco use, 3-4 servings ETOH 2-4 times per month  Social: , 4 kids (3 in 20s, one 17yo)      Current Outpatient Medications:     albuterol 2.5 mg /3 mL (0.083 %) nebulizer solution, Take 3 mL (2.5 mg) by nebulization every 4 hours if needed for wheezing., Disp: , Rfl:     albuterol 90 mcg/actuation inhaler, Inhale 2 puffs every 4 hours if needed., Disp: , Rfl:     aspirin 81 mg chewable tablet, Chew 1 tablet (81 mg) once daily., Disp: , Rfl:     b complex 0.4 mg tablet, Take 1 tablet by mouth once daily., Disp: , Rfl:     cholecalciferol (Vitamin D-3) 5,000 Units tablet, Take 2 tablets (10,000 Units) by mouth once daily., Disp: , Rfl:     clopidogrel (Plavix) 75 mg tablet, Take 1 tablet (75 mg) by mouth once daily., Disp: , Rfl:     collagen-biotin-ascorbic acid (Collagen 1500 Plus C) 500 mg-800 mcg- 50 mg capsule, Take 1 capsule by mouth once daily., Disp: , Rfl:     cyclobenzaprine (Flexeril) 10 mg tablet, Take 1 tablet (10 mg) by mouth 3 times a day as needed for muscle spasms., Disp: , Rfl:     guaiFENesin (Mucinex) 600 mg 12 hr tablet, Take 1 tablet (600 mg) by mouth every 12 hours if needed., Disp: , Rfl:     magnesium oxide (Mag-Ox) 400 mg tablet, Take 1 tablet (400 mg) by mouth once daily., Disp: , Rfl:     meloxicam (Mobic) 7.5 mg tablet, Take 1 tablet (7.5 mg) by mouth once daily., Disp: , Rfl:     metoprolol succinate XL (Toprol-XL) 50 mg 24 hr tablet, Take 1 tablet (50 mg) by mouth once daily. Do not crush or chew., Disp: , Rfl:     multivitamin with minerals tablet, Take 1 tablet by mouth once daily., Disp: , Rfl:     primidone (Mysoline) 50 mg tablet, Take 1 tablet (50 mg) by mouth 2 times a day as needed., Disp: , Rfl:      sertraline (Zoloft) 100 mg tablet, Take 1.5 tablets (150 mg) by mouth once daily., Disp: 135 tablet, Rfl: 3    azelastine (Astelin) 137 mcg (0.1 %) nasal spray, Administer 2 sprays into each nostril 2 times a day., Disp: 30 mL, Rfl: 1    hydrOXYzine HCL (Atarax) 25 mg tablet, Take 1 tablet (25 mg) by mouth every 8 hours if needed for itching. (Patient taking differently: Take 1 tablet (25 mg) by mouth once daily at bedtime.), Disp: 90 tablet, Rfl: 2    mecobalamin, vitamin B12, 1,000 mcg tablet,disintegrating, Place 1,000 mcg under the tongue once daily., Disp: 90 tablet, Rfl: 1    Past Medical History:   Diagnosis Date    Acute upper respiratory infection, unspecified 11/01/2021    Viral URI with cough    Effusion, unspecified knee 07/02/2021    Suprapatellar effusion of knee    Encounter for screening for malignant neoplasm of colon 05/06/2015    Screening for colon cancer    Pain in left knee 07/06/2021    Acute pain of left knee    Pain in left knee 09/07/2017    Left knee pain    Pain in left lower leg 07/02/2021    Pain of left calf    Pain in right finger(s)     Pain of right thumb    Personal history of other diseases of the nervous system and sense organs 06/28/2016    History of labyrinthitis    Personal history of other diseases of the nervous system and sense organs     History of migraine    Personal history of other diseases of the respiratory system 02/10/2021    History of pharyngitis    Personal history of other endocrine, nutritional and metabolic disease     History of goiter    Personal history of other infectious and parasitic diseases     History of varicella    Personal history of other infectious and parasitic diseases     History of mumps    Personal history of other infectious and parasitic diseases 07/13/2021    History of herpes zoster    Personal history of other infectious and parasitic diseases 11/04/2021    History of viral infection    Personal history of other medical treatment      History of echocardiogram    Personal history of other medical treatment     History of cardiac monitoring    Personal history of other specified conditions 2021    History of right flank pain    Right upper quadrant pain 2015    RUQ abdominal pain    Segmental and somatic dysfunction of cervical region 2015    Segmental and somatic dysfunction of cervical region    Strain of other muscle(s) and tendon(s) at lower leg level, left leg, initial encounter 2021    Strain of calf muscle, left, initial encounter    Strain of unspecified muscle(s) and tendon(s) at lower leg level, left leg, initial encounter 2021    Strain of left knee, initial encounter    Unilateral primary osteoarthritis, left knee 10/20/2016    Osteoarthritis of left knee    Unspecified acute conjunctivitis, bilateral 2017    Acute conjunctivitis of both eyes, unspecified acute conjunctivitis type    Unspecified tear of unspecified meniscus, current injury, left knee, initial encounter 2017    Tear of meniscus of left knee    Urinary tract infection, site not specified 2021    Acute lower UTI       Past Surgical History:   Procedure Laterality Date    APPENDECTOMY  2015    Appendectomy    BELT ABDOMINOPLASTY  2015    Abdominoplasty     SECTION, CLASSIC  2015     Section    CT ANGIO NECK  2021    CT NECK ANGIO W AND WO IV CONTRAST 2021 DOCTOR OFFICE LEGACY    CT HEAD ANGIO W AND WO IV CONTRAST  2021    CT HEAD ANGIO W AND WO IV CONTRAST 2021 DOCTOR OFFICE LEGACY    ENDOMETRIAL ABLATION  2015    Gynecologic Services Thermal Endometrial Ablation    OTHER SURGICAL HISTORY  2015    Breast Surgery Enlargement Procedure    OTHER SURGICAL HISTORY  2021    Knee replacement    OTHER SURGICAL HISTORY  2022    Patent foramen ovale repair       Family History   Problem Relation Name Age of Onset    Anxiety disorder Mother      Allergies  "Father          seasonal    Alcohol abuse Father          severe    Breast cancer Sister         Objective   /90 (BP Location: Left arm, Patient Position: Sitting, BP Cuff Size: Adult)   Pulse 89   Temp 36.4 °C (97.5 °F) (Temporal)   Ht 1.676 m (5' 6\")   Wt 66.7 kg (147 lb)   SpO2 98%   BMI 23.73 kg/m²     Physical Exam  Vitals reviewed.   Constitutional:       Appearance: Normal appearance.   HENT:      Mouth/Throat:      Mouth: Mucous membranes are moist.   Eyes:      Conjunctiva/sclera: Conjunctivae normal.   Cardiovascular:      Rate and Rhythm: Normal rate and regular rhythm.      Heart sounds: Normal heart sounds.   Pulmonary:      Effort: Pulmonary effort is normal.      Breath sounds: Normal breath sounds.   Abdominal:      General: Bowel sounds are normal.      Palpations: Abdomen is soft.   Musculoskeletal:         General: Normal range of motion.      Cervical back: Neck supple.   Skin:     General: Skin is warm and dry.   Neurological:      General: No focal deficit present.   Psychiatric:         Mood and Affect: Mood normal.         Cognition and Memory: Cognition normal.         Assessment/Plan   Problem List Items Addressed This Visit             ICD-10-CM       High    Post covid-19 condition, unspecified - Primary U09.9     02/2024: shortness of breath, cough, rhinitis, palpitations, dizziness, brain fog, fatigue, chronic pain, numbness and tingling, tremors  -Vitamin B12 is low, I prescribed a daily supplement for this   -repeat bloodwork: DERREK  -referral to Dr. Carter with pain management  -prescription Azelastine nose spray to be added to daily Flonase and Zyrtec, if no improvement in this will refer to ENT  -dizziness exercises  -let me know when you are ready for referral to Physical Therapy and Speech Therapy  -discuss sleep apnea treatment with Dr. Herron        09/2023:  Shortness of breath, cough, rhinitis, palpitations, dizziness, cognitive and memory changes, fatigue, " rashes, pain, numbness nd tingling, tremors, anxiety, shingles  -continue close follow-up with your specialists and their recommendations  -referral to MELA Tirado for therapy, her office will call you to set up an appoinment  -referral to Dr. Carter with pain management  -schedule a visit with Rheumatologist Dr. Marcie Tyson at [facility] [phone]  -let me know when you are ready to restart PT and OT  -I sent lidocaine ointment to your pharmacy to help with shingles pain, you can also take Tylenol, follow-up with PCP as needed    04/2023: shortness of breath, cough, rhinitis, palpitations, dizziness, syncopal episodes, difficulty with speech, brain fog, rashes, skin sensitivity, joint pain, numbness and tingling, tremors, fatigue, headaches, anxiety  .wendy  -decrease Metoprolol dose to 1 tablet per day and keep a log of your blood pressure  -inform your PCP, cardiologist, and neurologist of recurrent syncopal episodes and low BP, let them know that I had you decrease the metoprolol dose back down to 1 tablet (50mg) sustained release once daily  -for anxiety, referral to Long COVID support group  -schedule with Rheumatologist dr. Marcie Tyson at [facility] [phone]  -referral to SLP for brain fog and speech  -referral to vestibular rehab for dizziness and imbalance  .con  -stop taking Benadryl, for allergies use Flonase daily and OTC non-drowsy medication such as Claritin or Zyrtec    01/2023  Shortness of breath, cough, palpitations, dizziness, difficulty with swallowing and speech, skin changes, joint pain, numbness and tingling, tremors, fatigue, brain fog, headaches, parosmia, GI complaints, anxiety  -follow-up with referral to sleep medicine, .wendy  -schedule with Rheumatologist dr. Marcie Tyson at [facility] [phone]  -follow closely with your specialists including GI, cardiology, neurology, and pulmonary  -continue with rehabilitation: PT, OT, SLP  -continue closely with  psychology  .con  .brain    09/2022:  Shortness of breath, cough, rhinitis, palpitations, dizziness, trouble swallowing, difficulty with speech, rash, joint pain, numbness and tingling, tremors, fatigue, brain fog, headaches, parosmia, GI complaints, anxiety  .blood  .hsat  -referral to Rheumatology Dr. Marcie Tyson at [facility] [phone]  -add Azelastine nose spray to Flonase  -try Benefiber and Probiotic for GI complaints, if no improvement then we will consider referral to Gastroenterology  -Nutritional Roots 42 Billion CFU Probiotics ([employer] or [employer])- recommend to take before bed  -follow-up with your current specialists and their recommendations  -we will consider rehabilitation with SLP and PT down the road  .con  -continue to work closely with Psychiatry to help with your anxiety  .psychology  .fog  .brain  .vax  Sleep medicine         Relevant Medications    mecobalamin, vitamin B12, 1,000 mcg tablet,disintegrating    azelastine (Astelin) 137 mcg (0.1 %) nasal spray    Other Relevant Orders    DERREK with Reflex to CASSANDRA    Referral to Pain Medicine       Medium    Allergic urticaria L50.0     Other Visit Diagnoses         Codes    Vitamin B12 deficiency     E53.8    Relevant Medications    mecobalamin, vitamin B12, 1,000 mcg tablet,disintegrating    Sinus complaint     R09.89    Relevant Medications    azelastine (Astelin) 137 mcg (0.1 %) nasal spray    Other chronic pain     G89.29    Relevant Orders    Referral to Pain Medicine

## 2024-02-05 ENCOUNTER — TELEPHONE (OUTPATIENT)
Dept: OTHER | Facility: CLINIC | Age: 59
End: 2024-02-05
Payer: COMMERCIAL

## 2024-02-05 NOTE — TELEPHONE ENCOUNTER
Message left for patient regarding surveys.  Instructions and EOB deadline given as well as informing of the need to reschedule if forms are not completed.

## 2024-02-08 ENCOUNTER — OFFICE VISIT (OUTPATIENT)
Dept: OTHER | Facility: CLINIC | Age: 59
End: 2024-02-08
Payer: COMMERCIAL

## 2024-02-08 VITALS
WEIGHT: 147 LBS | SYSTOLIC BLOOD PRESSURE: 141 MMHG | DIASTOLIC BLOOD PRESSURE: 90 MMHG | BODY MASS INDEX: 23.63 KG/M2 | OXYGEN SATURATION: 98 % | HEIGHT: 66 IN | TEMPERATURE: 97.5 F | HEART RATE: 89 BPM

## 2024-02-08 DIAGNOSIS — E53.8 VITAMIN B12 DEFICIENCY: ICD-10-CM

## 2024-02-08 DIAGNOSIS — U09.9 POST COVID-19 CONDITION, UNSPECIFIED: Primary | ICD-10-CM

## 2024-02-08 DIAGNOSIS — R09.89 SINUS COMPLAINT: ICD-10-CM

## 2024-02-08 DIAGNOSIS — L50.0 ALLERGIC URTICARIA: ICD-10-CM

## 2024-02-08 DIAGNOSIS — G89.29 OTHER CHRONIC PAIN: ICD-10-CM

## 2024-02-08 PROCEDURE — 1036F TOBACCO NON-USER: CPT | Performed by: NURSE PRACTITIONER

## 2024-02-08 PROCEDURE — 99215 OFFICE O/P EST HI 40 MIN: CPT | Performed by: NURSE PRACTITIONER

## 2024-02-08 PROCEDURE — 3077F SYST BP >= 140 MM HG: CPT | Performed by: NURSE PRACTITIONER

## 2024-02-08 PROCEDURE — 3080F DIAST BP >= 90 MM HG: CPT | Performed by: NURSE PRACTITIONER

## 2024-02-08 PROCEDURE — 99215 OFFICE O/P EST HI 40 MIN: CPT | Mod: ZK | Performed by: NURSE PRACTITIONER

## 2024-02-08 RX ORDER — MELOXICAM 7.5 MG/1
7.5 TABLET ORAL DAILY
COMMUNITY
End: 2024-02-09 | Stop reason: SDUPTHER

## 2024-02-08 RX ORDER — CYCLOBENZAPRINE HCL 10 MG
10 TABLET ORAL 3 TIMES DAILY PRN
COMMUNITY

## 2024-02-08 RX ORDER — CHOLECALCIFEROL (VITAMIN D3) 125 MCG
1000 CAPSULE ORAL DAILY
Qty: 90 TABLET | Refills: 1 | Status: SHIPPED | OUTPATIENT
Start: 2024-02-08 | End: 2024-04-30 | Stop reason: ALTCHOICE

## 2024-02-08 RX ORDER — AZELASTINE 1 MG/ML
2 SPRAY, METERED NASAL 2 TIMES DAILY
Qty: 30 ML | Refills: 1 | Status: SHIPPED | OUTPATIENT
Start: 2024-02-08 | End: 2024-05-08

## 2024-02-08 ASSESSMENT — PAIN SCALES - GENERAL: PAINLEVEL: 0-NO PAIN

## 2024-02-09 DIAGNOSIS — M25.541 ARTHRALGIA OF BOTH HANDS: Primary | ICD-10-CM

## 2024-02-09 DIAGNOSIS — M25.542 ARTHRALGIA OF BOTH HANDS: Primary | ICD-10-CM

## 2024-02-09 RX ORDER — MELOXICAM 7.5 MG/1
7.5 TABLET ORAL DAILY
Qty: 30 TABLET | Refills: 0 | Status: SHIPPED | OUTPATIENT
Start: 2024-02-09 | End: 2024-03-10

## 2024-03-12 ENCOUNTER — APPOINTMENT (OUTPATIENT)
Dept: PRIMARY CARE | Facility: CLINIC | Age: 59
End: 2024-03-12
Payer: COMMERCIAL

## 2024-04-29 NOTE — PROGRESS NOTES
Subjective     History of Present Illness:   Nicolasa Burger is a 58 y.o. female who presents to GI clinic for follow-up.    She was seen by us in the past when she was admitted to the hospital in 2022 for acute pancreatitis unclear etiology however she did have concern for alcohol abuse.  Since then she has not been drinking at all.  In fact her  has alcoholic cirrhosis of the liver and quit drinking completely 2 years ago and he actually got a liver transplant.  She has not had any problems with pancreatitis since then however she has been having intermittent epigastric pain worsening heartburn.  She went to the emergency room in December had a CT scan that showed duodenitis.  Of note the CT scan also commented on some question another control of the liver but did not make it, not cirrhosis.  I looked at the ultrasound done 2 years ago and she had some hepatic steatosis.  She takes Prilosec over-the-counter almost every day because she is afraid that if she does not she will have severe heartburn.  She does have a history of stroke in the past and she this is why she takes Plavix but she has stopped it before.      Review of Systems  Review of Systems   Constitutional: Negative.    Respiratory: Negative.     Cardiovascular: Negative.    Musculoskeletal: Negative.    Skin: Negative.        Past Medical History   has a past medical history of Acute upper respiratory infection, unspecified (11/01/2021), Effusion, unspecified knee (07/02/2021), Encounter for screening for malignant neoplasm of colon (05/06/2015), Pain in left knee (07/06/2021), Pain in left knee (09/07/2017), Pain in left lower leg (07/02/2021), Pain in right finger(s), Personal history of other diseases of the nervous system and sense organs (06/28/2016), Personal history of other diseases of the nervous system and sense organs, Personal history of other diseases of the respiratory system (02/10/2021), Personal history of other endocrine,  nutritional and metabolic disease, Personal history of other infectious and parasitic diseases, Personal history of other infectious and parasitic diseases, Personal history of other infectious and parasitic diseases (07/13/2021), Personal history of other infectious and parasitic diseases (11/04/2021), Personal history of other medical treatment, Personal history of other medical treatment, Personal history of other specified conditions (07/19/2021), Right upper quadrant pain (07/29/2015), Segmental and somatic dysfunction of cervical region (12/12/2015), Strain of other muscle(s) and tendon(s) at lower leg level, left leg, initial encounter (07/02/2021), Strain of unspecified muscle(s) and tendon(s) at lower leg level, left leg, initial encounter (07/02/2021), Unilateral primary osteoarthritis, left knee (10/20/2016), Unspecified acute conjunctivitis, bilateral (07/12/2017), Unspecified tear of unspecified meniscus, current injury, left knee, initial encounter (09/07/2017), and Urinary tract infection, site not specified (08/16/2021).     Social History   reports that she has never smoked. She has never used smokeless tobacco. Alcohol use questions deferred to the physician. She reports that she does not use drugs.     Family History  family history includes Alcohol abuse in her father; Allergies in her father; Anxiety disorder in her mother; Breast cancer in her sister.     Allergies  No Known Allergies    Medications  Current Outpatient Medications   Medication Instructions    albuterol 90 mcg/actuation inhaler Inhale 2 puffs every 4 hours if needed.    albuterol 2.5 mg, nebulization, Every 4 hours PRN    aspirin 81 mg chewable tablet Chew 1 tablet (81 mg) once daily.    azelastine (Astelin) 137 mcg (0.1 %) nasal spray 2 sprays, Each Nostril, 2 times daily    b complex 0.4 mg tablet 1 tablet, oral, Daily    cholecalciferol (Vitamin D-3) 5,000 Units tablet 2 tablets, oral, Daily    clopidogrel (PLAVIX) 75 mg,  oral, Daily    collagen-biotin-ascorbic acid (Collagen 1500 Plus C) 500 mg-800 mcg- 50 mg capsule 1 capsule, oral, Daily    cyclobenzaprine (FLEXERIL) 10 mg, oral, 3 times daily PRN    guaiFENesin (MUCINEX) 600 mg, oral, Every 12 hours PRN    hydrOXYzine HCL (ATARAX) 25 mg, oral, Every 8 hours PRN    magnesium oxide (MAG-OX) 400 mg, oral, Daily    mecobalamin (vitamin B12) 1,000 mcg, sublingual, Daily    metoprolol succinate XL (TOPROL-XL) 50 mg, oral, Daily, Do not crush or chew.    multivitamin with minerals tablet 1 tablet, oral, Daily    primidone (MYSOLINE) 50 mg, oral, 2 times daily PRN    sertraline (ZOLOFT) 150 mg, oral, Daily       Objective   There were no vitals filed for this visit.  Physical Exam  Vitals reviewed.   Constitutional:       Appearance: Normal appearance.   Cardiovascular:      Rate and Rhythm: Normal rate and regular rhythm.      Pulses: Normal pulses.   Pulmonary:      Effort: Pulmonary effort is normal.      Breath sounds: Normal breath sounds.   Abdominal:      General: Abdomen is flat. Bowel sounds are normal. There is no distension.      Palpations: Abdomen is soft.      Tenderness: There is no abdominal tenderness.   Musculoskeletal:         General: Normal range of motion.   Neurological:      Mental Status: She is alert.                 Assessment/Plan   Nicolasa Burger is a 58 y.o. female who presents to GI clinic for evaluation for abdominal pain and acid reflux.  She had evidence of duodenitis on CT.    1.  GERD.  She also has a family history of Yun's esophagus in her diet.  Will do an upper endoscopy to evaluate for complications of GERD as well as esophagitis as well as the duodenitis.  I told her to try to stop PPIs prior to the endoscopy to try to get an EGD finding of her PPIs.  Okay to hold Plavix for 5 days.  The procedure is safe to be done at [facility].    2.  History of stasis hepatitis and alcohol abuse.  Because of the questionable nodular surface of the  liver seen on the CT I will order a FibroScan to determine if she has advanced fibrosis or not.  She has been completely abstinent from alcohol and LFTs have normalized since then so I do not think she has cirrhosis however I will confirm with a FibroScan.    She had a colonoscopy in 2017 and was normal repeat colonoscopy in 2027.          Pedrito Wilkerson MD

## 2024-04-29 NOTE — H&P (VIEW-ONLY)
Subjective     History of Present Illness:   Nicolasa Burger is a 58 y.o. female who presents to GI clinic for follow-up.    She was seen by us in the past when she was admitted to the hospital in 2022 for acute pancreatitis unclear etiology however she did have concern for alcohol abuse.  Since then she has not been drinking at all.  In fact her  has alcoholic cirrhosis of the liver and quit drinking completely 2 years ago and he actually got a liver transplant.  She has not had any problems with pancreatitis since then however she has been having intermittent epigastric pain worsening heartburn.  She went to the emergency room in December had a CT scan that showed duodenitis.  Of note the CT scan also commented on some question another control of the liver but did not make it, not cirrhosis.  I looked at the ultrasound done 2 years ago and she had some hepatic steatosis.  She takes Prilosec over-the-counter almost every day because she is afraid that if she does not she will have severe heartburn.  She does have a history of stroke in the past and she this is why she takes Plavix but she has stopped it before.      Review of Systems  Review of Systems   Constitutional: Negative.    Respiratory: Negative.     Cardiovascular: Negative.    Musculoskeletal: Negative.    Skin: Negative.        Past Medical History   has a past medical history of Acute upper respiratory infection, unspecified (11/01/2021), Effusion, unspecified knee (07/02/2021), Encounter for screening for malignant neoplasm of colon (05/06/2015), Pain in left knee (07/06/2021), Pain in left knee (09/07/2017), Pain in left lower leg (07/02/2021), Pain in right finger(s), Personal history of other diseases of the nervous system and sense organs (06/28/2016), Personal history of other diseases of the nervous system and sense organs, Personal history of other diseases of the respiratory system (02/10/2021), Personal history of other endocrine,  nutritional and metabolic disease, Personal history of other infectious and parasitic diseases, Personal history of other infectious and parasitic diseases, Personal history of other infectious and parasitic diseases (07/13/2021), Personal history of other infectious and parasitic diseases (11/04/2021), Personal history of other medical treatment, Personal history of other medical treatment, Personal history of other specified conditions (07/19/2021), Right upper quadrant pain (07/29/2015), Segmental and somatic dysfunction of cervical region (12/12/2015), Strain of other muscle(s) and tendon(s) at lower leg level, left leg, initial encounter (07/02/2021), Strain of unspecified muscle(s) and tendon(s) at lower leg level, left leg, initial encounter (07/02/2021), Unilateral primary osteoarthritis, left knee (10/20/2016), Unspecified acute conjunctivitis, bilateral (07/12/2017), Unspecified tear of unspecified meniscus, current injury, left knee, initial encounter (09/07/2017), and Urinary tract infection, site not specified (08/16/2021).     Social History   reports that she has never smoked. She has never used smokeless tobacco. Alcohol use questions deferred to the physician. She reports that she does not use drugs.     Family History  family history includes Alcohol abuse in her father; Allergies in her father; Anxiety disorder in her mother; Breast cancer in her sister.     Allergies  No Known Allergies    Medications  Current Outpatient Medications   Medication Instructions    albuterol 90 mcg/actuation inhaler Inhale 2 puffs every 4 hours if needed.    albuterol 2.5 mg, nebulization, Every 4 hours PRN    aspirin 81 mg chewable tablet Chew 1 tablet (81 mg) once daily.    azelastine (Astelin) 137 mcg (0.1 %) nasal spray 2 sprays, Each Nostril, 2 times daily    b complex 0.4 mg tablet 1 tablet, oral, Daily    cholecalciferol (Vitamin D-3) 5,000 Units tablet 2 tablets, oral, Daily    clopidogrel (PLAVIX) 75 mg,  oral, Daily    collagen-biotin-ascorbic acid (Collagen 1500 Plus C) 500 mg-800 mcg- 50 mg capsule 1 capsule, oral, Daily    cyclobenzaprine (FLEXERIL) 10 mg, oral, 3 times daily PRN    guaiFENesin (MUCINEX) 600 mg, oral, Every 12 hours PRN    hydrOXYzine HCL (ATARAX) 25 mg, oral, Every 8 hours PRN    magnesium oxide (MAG-OX) 400 mg, oral, Daily    mecobalamin (vitamin B12) 1,000 mcg, sublingual, Daily    metoprolol succinate XL (TOPROL-XL) 50 mg, oral, Daily, Do not crush or chew.    multivitamin with minerals tablet 1 tablet, oral, Daily    primidone (MYSOLINE) 50 mg, oral, 2 times daily PRN    sertraline (ZOLOFT) 150 mg, oral, Daily       Objective   There were no vitals filed for this visit.  Physical Exam  Vitals reviewed.   Constitutional:       Appearance: Normal appearance.   Cardiovascular:      Rate and Rhythm: Normal rate and regular rhythm.      Pulses: Normal pulses.   Pulmonary:      Effort: Pulmonary effort is normal.      Breath sounds: Normal breath sounds.   Abdominal:      General: Abdomen is flat. Bowel sounds are normal. There is no distension.      Palpations: Abdomen is soft.      Tenderness: There is no abdominal tenderness.   Musculoskeletal:         General: Normal range of motion.   Neurological:      Mental Status: She is alert.                 Assessment/Plan   Nicolasa Burger is a 58 y.o. female who presents to GI clinic for evaluation for abdominal pain and acid reflux.  She had evidence of duodenitis on CT.    1.  GERD.  She also has a family history of Yun's esophagus in her diet.  Will do an upper endoscopy to evaluate for complications of GERD as well as esophagitis as well as the duodenitis.  I told her to try to stop PPIs prior to the endoscopy to try to get an EGD finding of her PPIs.  Okay to hold Plavix for 5 days.  The procedure is safe to be done at [place].    2.  History of stasis hepatitis and alcohol abuse.  Because of the questionable nodular surface of the  liver seen on the CT I will order a FibroScan to determine if she has advanced fibrosis or not.  She has been completely abstinent from alcohol and LFTs have normalized since then so I do not think she has cirrhosis however I will confirm with a FibroScan.    She had a colonoscopy in 2017 and was normal repeat colonoscopy in 2027.          Pedrito Wilkerson MD

## 2024-04-30 ENCOUNTER — OFFICE VISIT (OUTPATIENT)
Dept: GASTROENTEROLOGY | Facility: CLINIC | Age: 59
End: 2024-04-30
Payer: COMMERCIAL

## 2024-04-30 VITALS
DIASTOLIC BLOOD PRESSURE: 95 MMHG | BODY MASS INDEX: 23.66 KG/M2 | HEART RATE: 103 BPM | WEIGHT: 142 LBS | SYSTOLIC BLOOD PRESSURE: 151 MMHG | HEIGHT: 65 IN

## 2024-04-30 DIAGNOSIS — K70.0 ALCOHOLIC FATTY LIVER: ICD-10-CM

## 2024-04-30 DIAGNOSIS — K21.9 GASTROESOPHAGEAL REFLUX DISEASE, UNSPECIFIED WHETHER ESOPHAGITIS PRESENT: Primary | ICD-10-CM

## 2024-04-30 DIAGNOSIS — K29.80 DUODENITIS: ICD-10-CM

## 2024-04-30 PROCEDURE — 3077F SYST BP >= 140 MM HG: CPT | Performed by: INTERNAL MEDICINE

## 2024-04-30 PROCEDURE — 99214 OFFICE O/P EST MOD 30 MIN: CPT | Performed by: INTERNAL MEDICINE

## 2024-04-30 PROCEDURE — 1036F TOBACCO NON-USER: CPT | Performed by: INTERNAL MEDICINE

## 2024-04-30 PROCEDURE — 3080F DIAST BP >= 90 MM HG: CPT | Performed by: INTERNAL MEDICINE

## 2024-04-30 RX ORDER — SODIUM CHLORIDE 9 MG/ML
20 INJECTION, SOLUTION INTRAVENOUS CONTINUOUS
Status: CANCELLED | OUTPATIENT
Start: 2024-04-30

## 2024-04-30 ASSESSMENT — ENCOUNTER SYMPTOMS
CONSTITUTIONAL NEGATIVE: 1
RESPIRATORY NEGATIVE: 1
CARDIOVASCULAR NEGATIVE: 1
MUSCULOSKELETAL NEGATIVE: 1

## 2024-05-06 ENCOUNTER — CLINICAL SUPPORT (OUTPATIENT)
Dept: GASTROENTEROLOGY | Facility: HOSPITAL | Age: 59
End: 2024-05-06
Payer: COMMERCIAL

## 2024-05-06 DIAGNOSIS — K70.0 ALCOHOLIC FATTY LIVER: ICD-10-CM

## 2024-05-06 PROCEDURE — 91200 LIVER ELASTOGRAPHY: CPT | Performed by: INTERNAL MEDICINE

## 2024-05-09 ENCOUNTER — APPOINTMENT (OUTPATIENT)
Dept: OTHER | Facility: CLINIC | Age: 59
End: 2024-05-09
Payer: COMMERCIAL

## 2024-05-09 ENCOUNTER — ANESTHESIA EVENT (OUTPATIENT)
Dept: GASTROENTEROLOGY | Facility: EXTERNAL LOCATION | Age: 59
End: 2024-05-09

## 2024-05-09 DIAGNOSIS — Q21.12 PFO (PATENT FORAMEN OVALE) (HHS-HCC): ICD-10-CM

## 2024-05-09 DIAGNOSIS — R00.2 PALPITATIONS: ICD-10-CM

## 2024-05-09 DIAGNOSIS — I10 BENIGN ESSENTIAL HTN: ICD-10-CM

## 2024-05-09 RX ORDER — METOPROLOL SUCCINATE 50 MG/1
50 TABLET, EXTENDED RELEASE ORAL DAILY
Qty: 90 TABLET | Refills: 3 | Status: SHIPPED | OUTPATIENT
Start: 2024-05-09

## 2024-05-13 ENCOUNTER — TELEPHONE (OUTPATIENT)
Dept: GASTROENTEROLOGY | Facility: CLINIC | Age: 59
End: 2024-05-13
Payer: COMMERCIAL

## 2024-05-13 DIAGNOSIS — L50.0 ALLERGIC URTICARIA: ICD-10-CM

## 2024-05-13 RX ORDER — HYDROXYZINE HYDROCHLORIDE 25 MG/1
25 TABLET, FILM COATED ORAL NIGHTLY
Qty: 90 TABLET | Refills: 1 | Status: SHIPPED | OUTPATIENT
Start: 2024-05-13 | End: 2024-07-12

## 2024-05-13 NOTE — TELEPHONE ENCOUNTER
Pt is on Plavix and having a colonoscopy on 5/15/24.  Please advise a stop date for her Plavix.    Mercedes Marte RN

## 2024-05-15 ENCOUNTER — ANESTHESIA (OUTPATIENT)
Dept: GASTROENTEROLOGY | Facility: EXTERNAL LOCATION | Age: 59
End: 2024-05-15

## 2024-05-15 ENCOUNTER — HOSPITAL ENCOUNTER (OUTPATIENT)
Dept: GASTROENTEROLOGY | Facility: EXTERNAL LOCATION | Age: 59
Discharge: HOME | End: 2024-05-15
Payer: COMMERCIAL

## 2024-05-15 VITALS
BODY MASS INDEX: 23.66 KG/M2 | TEMPERATURE: 98.1 F | WEIGHT: 142 LBS | HEIGHT: 65 IN | HEART RATE: 87 BPM | SYSTOLIC BLOOD PRESSURE: 105 MMHG | OXYGEN SATURATION: 96 % | RESPIRATION RATE: 14 BRPM | DIASTOLIC BLOOD PRESSURE: 62 MMHG

## 2024-05-15 DIAGNOSIS — K26.9 DUODENAL ULCER: ICD-10-CM

## 2024-05-15 DIAGNOSIS — K29.80 DUODENITIS: ICD-10-CM

## 2024-05-15 DIAGNOSIS — K21.9 GASTROESOPHAGEAL REFLUX DISEASE, UNSPECIFIED WHETHER ESOPHAGITIS PRESENT: Primary | ICD-10-CM

## 2024-05-15 PROCEDURE — 88305 TISSUE EXAM BY PATHOLOGIST: CPT | Performed by: SPECIALIST

## 2024-05-15 PROCEDURE — 43239 EGD BIOPSY SINGLE/MULTIPLE: CPT | Performed by: INTERNAL MEDICINE

## 2024-05-15 PROCEDURE — 88305 TISSUE EXAM BY PATHOLOGIST: CPT | Mod: TC,59,ELYLAB | Performed by: INTERNAL MEDICINE

## 2024-05-15 RX ORDER — LIDOCAINE HYDROCHLORIDE 20 MG/ML
INJECTION, SOLUTION INFILTRATION; PERINEURAL AS NEEDED
Status: DISCONTINUED | OUTPATIENT
Start: 2024-05-15 | End: 2024-05-15

## 2024-05-15 RX ORDER — MELOXICAM 7.5 MG/1
7.5 TABLET ORAL DAILY
COMMUNITY

## 2024-05-15 RX ORDER — OMEPRAZOLE 40 MG/1
40 CAPSULE, DELAYED RELEASE ORAL
Qty: 60 CAPSULE | Refills: 2 | Status: SHIPPED | OUTPATIENT
Start: 2024-05-15 | End: 2024-08-13

## 2024-05-15 RX ORDER — PROPOFOL 10 MG/ML
INJECTION, EMULSION INTRAVENOUS AS NEEDED
Status: DISCONTINUED | OUTPATIENT
Start: 2024-05-15 | End: 2024-05-15

## 2024-05-15 RX ORDER — SODIUM CHLORIDE 9 MG/ML
20 INJECTION, SOLUTION INTRAVENOUS CONTINUOUS
Status: DISCONTINUED | OUTPATIENT
Start: 2024-05-15 | End: 2024-05-16 | Stop reason: HOSPADM

## 2024-05-15 RX ADMIN — PROPOFOL 100 MG: 10 INJECTION, EMULSION INTRAVENOUS at 14:19

## 2024-05-15 RX ADMIN — LIDOCAINE HYDROCHLORIDE 3 ML: 20 INJECTION, SOLUTION INFILTRATION; PERINEURAL at 14:19

## 2024-05-15 RX ADMIN — PROPOFOL 25 MG: 10 INJECTION, EMULSION INTRAVENOUS at 14:25

## 2024-05-15 RX ADMIN — PROPOFOL 50 MG: 10 INJECTION, EMULSION INTRAVENOUS at 14:23

## 2024-05-15 RX ADMIN — PROPOFOL 50 MG: 10 INJECTION, EMULSION INTRAVENOUS at 14:22

## 2024-05-15 RX ADMIN — SODIUM CHLORIDE: 9 INJECTION, SOLUTION INTRAVENOUS at 14:15

## 2024-05-15 SDOH — HEALTH STABILITY: MENTAL HEALTH: CURRENT SMOKER: 0

## 2024-05-15 ASSESSMENT — COLUMBIA-SUICIDE SEVERITY RATING SCALE - C-SSRS
2. HAVE YOU ACTUALLY HAD ANY THOUGHTS OF KILLING YOURSELF?: NO
1. IN THE PAST MONTH, HAVE YOU WISHED YOU WERE DEAD OR WISHED YOU COULD GO TO SLEEP AND NOT WAKE UP?: NO
6. HAVE YOU EVER DONE ANYTHING, STARTED TO DO ANYTHING, OR PREPARED TO DO ANYTHING TO END YOUR LIFE?: NO

## 2024-05-15 ASSESSMENT — PAIN - FUNCTIONAL ASSESSMENT
PAIN_FUNCTIONAL_ASSESSMENT: 0-10

## 2024-05-15 ASSESSMENT — PAIN SCALES - GENERAL
PAINLEVEL_OUTOF10: 0 - NO PAIN
PAIN_LEVEL: 0
PAINLEVEL_OUTOF10: 0 - NO PAIN

## 2024-05-15 NOTE — ANESTHESIA PREPROCEDURE EVALUATION
Patient: Nicolasa Burger    Procedure Information       Date/Time: 05/15/24 1400    Scheduled providers: Pedrito Wilkerson MD    Procedure: EGD    Location: [facility]            Relevant Problems   Cardiac   (+) Benign essential HTN   (+) Hypertriglyceridemia   (+) PFO (patent foramen ovale) (HHS-HCC)      Pulmonary   (+) Severe obstructive sleep apnea      Neuro   (+) Anxiety   (+) CVA (cerebral vascular accident) (Multi)   (+) Depressive disorder   (+) PEDRO (generalized anxiety disorder)   (+) Mononeuropathy of left median nerve   (+) Mononeuropathy of right median nerve   (+) Radiculopathy of arm      GI   (+) Dysphagia   (+) GERD (gastroesophageal reflux disease)      Liver   (+) Elevated LFTs   (+) Pancreatitis, acute (HHS-HCC)      HEENT   (+) Seasonal allergies      ID   (+) COVID-19   (+) Dermatophytosis      Skin   (+) Rash       Clinical information reviewed:   Tobacco  Allergies  Meds   Med Hx  Surg Hx  OB Status  Fam Hx  Soc   Hx        NPO Detail:  NPO/Void Status  Date of Last Liquid: 05/14/24  Time of Last Liquid: 2000  Date of Last Solid: 05/14/24  Time of Last Solid: 2000         Physical Exam    Airway  Mallampati: III  TM distance: >3 FB  Neck ROM: full     Cardiovascular - normal exam  Rhythm: regular  Rate: normal     Dental - normal exam     Pulmonary - normal exam  Breath sounds clear to auscultation     Abdominal - normal exam         Anesthesia Plan    History of general anesthesia?: yes  History of complications of general anesthesia?: no    ASA 2     MAC     The patient is not a current smoker.  Education provided regarding risk of obstructive sleep apnea.  intravenous induction   Anesthetic plan and risks discussed with patient.    Plan discussed with CRNA.

## 2024-05-15 NOTE — DISCHARGE INSTRUCTIONS
Patient Instructions Post Procedure      The anesthetics, sedatives or narcotics which were given to you today will be acting in your body for the next 24 hours, so you might feel a little sleepy or groggy.  This feeling should slowly wear off. Carefully read and follow the instructions.     You received sedation today:  - Do not drive or operate any machinery or power tools of any kind.   - No alcoholic beverages today, not even beer or wine.  - Do not make any important decisions or sign any legal documents.  - No over the counter medications that contain alcohol or that may cause drowsiness.    While it is common to experience mild to moderate abdominal distention, gas, or belching after your procedure, if any of these symptoms occur following discharge from the GI Lab or within one week of having your procedure, call the [facility] to be advised whether a visit to your nearest Urgent Care or Emergency Department is indicated.  Take this paper with you if you go.   - If you develop an allergic reaction to the medications that were given during your procedure such as difficulty breathing, rash, hives, severe nausea, vomiting or lightheadedness.  - If you experience chest pain, shortness of breath, severe abdominal pain, fevers and chills.  -If you develop signs and symptoms of bleeding such as blood in your spit, if your stools turn black, tarry, or bloody  - If you have not urinated within 8 hours following your procedure.  - If your IV site becomes painful, red, inflamed, or looks infected.    If you received a biopsy/polypectomy/sphincterotomy the following instructions apply below:  __ Do not use Aspirin containing products, non-steroidal medications or anti-coagulants for one week following your procedure. (Examples of these types of medications are: Advil, Arthrotec, Aleve, Coumadin, Ecotrin, Heparin, Ibuprofen, Indocin, Motrin, Naprosyn, Nuprin, Plavix, Vioxx, and Voltarin, or their generic  forms.  This list is not all-inclusive.  Check with your physician or pharmacist before resuming medications.)   __ Eat a soft diet today.  Avoid foods that are poorly digested for the next 24 hours.  These foods would include: nuts, beans, lettuce, red meats, and fried foods. Start with liquids and advance your diet as tolerated, gradually work up to eating solids.   __ Do not have a Barium Study or Enema for one week.    Your physician recommends the additional following instructions:    -You have a contact number available for emergencies. The signs and symptoms of potential delayed complications were discussed with you. You may return to normal activities tomorrow.  -Resume your previous diet or other if specified.  -Continue your present medications.   -We are waiting for your pathology results, if applicable.  -The findings and recommendations have been discussed with you and/or family.  - Please see Medication Reconciliation Form for new medication/medications prescribed.     If you experience any problems or have any questions following discharge from the GI Lab, please call: [phone] from 7 am- 4:30 pm.  In the event of an emergency please go to the closest Emergency Department or call Dr. Wilkerson [phone]

## 2024-05-15 NOTE — ANESTHESIA POSTPROCEDURE EVALUATION
Patient: Nicolasa Burger    Procedure Summary       Date: 05/15/24 Room / Location: [facility]    Anesthesia Start: 1415 Anesthesia Stop: 1433    Procedure: EGD Diagnosis:       Gastroesophageal reflux disease, unspecified whether esophagitis present      Duodenitis      Gastroesophageal reflux disease, unspecified whether esophagitis present    Scheduled Providers: Pedrito Wilkerson MD Responsible Provider: SHANICE Diaz    Anesthesia Type: MAC ASA Status: 2            Anesthesia Type: MAC    Vitals Value Taken Time   /70 05/15/24 1431   Temp 36.7 °C (98.1 °F) 05/15/24 1431   Pulse 96 05/15/24 1431   Resp 24 05/15/24 1431   SpO2 92 % 05/15/24 1431       Anesthesia Post Evaluation    Patient location during evaluation: bedside  Patient participation: complete - patient participated  Level of consciousness: awake  Pain score: 0  Pain management: adequate  Airway patency: patent  Cardiovascular status: acceptable  Respiratory status: acceptable  Hydration status: acceptable  Postoperative Nausea and Vomiting: none    There were no known notable events for this encounter.

## 2024-05-15 NOTE — SIGNIFICANT EVENT
Physician at bedside to discuss procedure results with patient  Family updated  Tolerating PO fluids

## 2024-05-15 NOTE — INTERVAL H&P NOTE
H&P reviewed. The patient was examined and there are no changes to the H&P.    Nicoamapati 2  ASA 3

## 2024-05-24 LAB
LABORATORY COMMENT REPORT: NORMAL
PATH REPORT.FINAL DX SPEC: NORMAL
PATH REPORT.GROSS SPEC: NORMAL
PATH REPORT.TOTAL CANCER: NORMAL

## 2024-07-23 ENCOUNTER — APPOINTMENT (OUTPATIENT)
Dept: PRIMARY CARE | Facility: CLINIC | Age: 59
End: 2024-07-23
Payer: COMMERCIAL

## 2024-07-23 VITALS
HEART RATE: 93 BPM | WEIGHT: 140 LBS | OXYGEN SATURATION: 97 % | DIASTOLIC BLOOD PRESSURE: 80 MMHG | BODY MASS INDEX: 23.3 KG/M2 | SYSTOLIC BLOOD PRESSURE: 140 MMHG

## 2024-07-23 DIAGNOSIS — F41.9 ANXIETY: ICD-10-CM

## 2024-07-23 DIAGNOSIS — U09.9 POST COVID-19 CONDITION, UNSPECIFIED: ICD-10-CM

## 2024-07-23 DIAGNOSIS — R00.2 PALPITATIONS: ICD-10-CM

## 2024-07-23 DIAGNOSIS — R09.89 SINUS COMPLAINT: ICD-10-CM

## 2024-07-23 DIAGNOSIS — L50.0 ALLERGIC URTICARIA: ICD-10-CM

## 2024-07-23 DIAGNOSIS — E53.8 B12 DEFICIENCY: ICD-10-CM

## 2024-07-23 DIAGNOSIS — I10 BENIGN ESSENTIAL HTN: ICD-10-CM

## 2024-07-23 DIAGNOSIS — M79.10 MYALGIA: Primary | ICD-10-CM

## 2024-07-23 DIAGNOSIS — K26.9 DUODENAL ULCER: ICD-10-CM

## 2024-07-23 DIAGNOSIS — Q21.12 PFO (PATENT FORAMEN OVALE) (HHS-HCC): ICD-10-CM

## 2024-07-23 PROBLEM — R07.0 PAIN IN THROAT: Status: ACTIVE | Noted: 2024-07-23

## 2024-07-23 PROBLEM — F10.931 ALCOHOL WITHDRAWAL DELIRIUM (MULTI): Status: ACTIVE | Noted: 2024-07-23

## 2024-07-23 PROBLEM — G54.9 NERVE ROOT DISORDER: Status: ACTIVE | Noted: 2023-03-21

## 2024-07-23 PROBLEM — S37.009A INJURY OF KIDNEY: Status: ACTIVE | Noted: 2024-07-23

## 2024-07-23 PROBLEM — E87.6 HYPOKALEMIA: Status: ACTIVE | Noted: 2024-07-23

## 2024-07-23 PROBLEM — K86.1 CHRONIC PANCREATITIS (MULTI): Status: ACTIVE | Noted: 2023-03-21

## 2024-07-23 PROBLEM — Z86.39 HISTORY OF ELEVATED LIPIDS: Status: ACTIVE | Noted: 2024-07-23

## 2024-07-23 PROBLEM — B02.9 HERPES ZOSTER: Status: ACTIVE | Noted: 2024-07-23

## 2024-07-23 PROBLEM — Z86.19 HISTORY OF MUMPS: Status: ACTIVE | Noted: 2024-07-23

## 2024-07-23 PROBLEM — Z86.19 HISTORY OF VARICELLA: Status: ACTIVE | Noted: 2024-07-23

## 2024-07-23 PROBLEM — M62.81 MUSCLE WEAKNESS OF UPPER EXTREMITY: Status: ACTIVE | Noted: 2024-07-23

## 2024-07-23 PROBLEM — R94.31 PROLONGED QT INTERVAL: Status: ACTIVE | Noted: 2024-07-23

## 2024-07-23 PROBLEM — R40.1 CLOUDED CONSCIOUSNESS: Status: ACTIVE | Noted: 2024-07-23

## 2024-07-23 PROBLEM — R41.844 IMPAIRED EXECUTIVE FUNCTIONING: Status: ACTIVE | Noted: 2023-11-22

## 2024-07-23 PROBLEM — E83.42 HYPOMAGNESEMIA: Status: ACTIVE | Noted: 2024-07-23

## 2024-07-23 PROBLEM — Z86.79 HISTORY OF HYPERTENSION: Status: ACTIVE | Noted: 2024-07-23

## 2024-07-23 PROBLEM — K52.9 ENTEROCOLITIS: Status: ACTIVE | Noted: 2024-07-23

## 2024-07-23 PROBLEM — G89.29 CHRONIC PAIN: Status: ACTIVE | Noted: 2024-07-23

## 2024-07-23 PROBLEM — R19.7 DIARRHEA: Status: ACTIVE | Noted: 2024-07-23

## 2024-07-23 PROBLEM — M79.643 PAIN OF HAND: Status: ACTIVE | Noted: 2024-07-23

## 2024-07-23 PROCEDURE — 1036F TOBACCO NON-USER: CPT | Performed by: STUDENT IN AN ORGANIZED HEALTH CARE EDUCATION/TRAINING PROGRAM

## 2024-07-23 PROCEDURE — 3077F SYST BP >= 140 MM HG: CPT | Performed by: STUDENT IN AN ORGANIZED HEALTH CARE EDUCATION/TRAINING PROGRAM

## 2024-07-23 PROCEDURE — 99213 OFFICE O/P EST LOW 20 MIN: CPT | Performed by: STUDENT IN AN ORGANIZED HEALTH CARE EDUCATION/TRAINING PROGRAM

## 2024-07-23 PROCEDURE — 3079F DIAST BP 80-89 MM HG: CPT | Performed by: STUDENT IN AN ORGANIZED HEALTH CARE EDUCATION/TRAINING PROGRAM

## 2024-07-23 RX ORDER — SERTRALINE HYDROCHLORIDE 100 MG/1
150 TABLET, FILM COATED ORAL DAILY
Qty: 135 TABLET | Refills: 3 | Status: SHIPPED | OUTPATIENT
Start: 2024-07-23 | End: 2025-07-23

## 2024-07-23 RX ORDER — CHOLECALCIFEROL (VITAMIN D3) 25 MCG
TABLET,CHEWABLE ORAL
Qty: 90 LOZENGE | Refills: 3 | Status: SHIPPED | OUTPATIENT
Start: 2024-07-23

## 2024-07-23 RX ORDER — OMEPRAZOLE 40 MG/1
40 CAPSULE, DELAYED RELEASE ORAL
Qty: 180 CAPSULE | Refills: 3 | Status: SHIPPED | OUTPATIENT
Start: 2024-07-23 | End: 2025-07-23

## 2024-07-23 RX ORDER — HYDROXYZINE HYDROCHLORIDE 25 MG/1
25 TABLET, FILM COATED ORAL NIGHTLY
Qty: 90 TABLET | Refills: 3 | Status: SHIPPED | OUTPATIENT
Start: 2024-07-23 | End: 2025-07-23

## 2024-07-23 RX ORDER — METOPROLOL SUCCINATE 50 MG/1
50 TABLET, EXTENDED RELEASE ORAL DAILY
Qty: 90 TABLET | Refills: 3 | Status: SHIPPED | OUTPATIENT
Start: 2024-07-23

## 2024-07-23 RX ORDER — AZELASTINE 1 MG/ML
2 SPRAY, METERED NASAL 2 TIMES DAILY
Qty: 90 ML | Refills: 3 | Status: SHIPPED | OUTPATIENT
Start: 2024-07-23 | End: 2025-07-23

## 2024-07-23 RX ORDER — CYCLOBENZAPRINE HCL 10 MG
10 TABLET ORAL 3 TIMES DAILY PRN
Qty: 30 TABLET | Refills: 2 | Status: SHIPPED | OUTPATIENT
Start: 2024-07-23

## 2024-07-23 ASSESSMENT — PAIN SCALES - GENERAL: PAINLEVEL: 0-NO PAIN

## 2024-07-23 ASSESSMENT — ENCOUNTER SYMPTOMS: DEPRESSION: 0

## 2024-07-23 NOTE — PROGRESS NOTES
Subjective   Patient ID: Nicolasa Burger is a 59 y.o. female who presents for Med Refill.    HPI comes in for med refills.  No new complaints    Review of Systems  Constitutional: NO F, chills, or sweats  Eyes: no blurred vision or visual disturbance  ENT: no hearing loss, no congestion, no nasal discharge, no hoarseness and no sore throat.   Cardiovascular: no chest pain, no edema, no palps and no syncope.   Respiratory: no cough,no s.o.b. and no wheezing  Gastrointestinal: no abdominal pain, No C/D no N/V, no blood in stools  Genitourinary: no dysuria, no change in urinary frequency, no urinary hesitancy and no feelings of urinary urgency.   Musculoskeletal: no arthralgias,  no back pain and no myalgias.   Integumentary: no new skin lesions and no rashes.   Neurological: no difficulty walking, no headache, no limb weakness, no numbness and no tingling.   Psychiatric: no anxiety, no depression, no anhedonia and no substance use disorders.   Endocrine: no recent weight gain and no recent weight loss.   Hematologic/Lymphatic: no tendency for easy bruising and no swollen glands.  Objective   /80 (BP Location: Left arm, Patient Position: Sitting, BP Cuff Size: Adult)   Pulse 93   Wt 63.5 kg (140 lb)   SpO2 97%   BMI 23.30 kg/m²     Physical Exam  gen- a & o x 3, nad, pleasant    Assessment/Plan     #1.  Just comes in for med refills today.  Overall she is feeling well now alcohol free for 1 year.

## 2024-08-15 ENCOUNTER — OFFICE VISIT (OUTPATIENT)
Dept: PRIMARY CARE | Facility: CLINIC | Age: 59
End: 2024-08-15
Payer: COMMERCIAL

## 2024-08-15 VITALS
WEIGHT: 137 LBS | HEART RATE: 82 BPM | DIASTOLIC BLOOD PRESSURE: 60 MMHG | SYSTOLIC BLOOD PRESSURE: 108 MMHG | HEIGHT: 65 IN | OXYGEN SATURATION: 97 % | BODY MASS INDEX: 22.82 KG/M2

## 2024-08-15 DIAGNOSIS — K21.9 GASTROESOPHAGEAL REFLUX DISEASE, UNSPECIFIED WHETHER ESOPHAGITIS PRESENT: Primary | ICD-10-CM

## 2024-08-15 PROCEDURE — 99214 OFFICE O/P EST MOD 30 MIN: CPT | Performed by: STUDENT IN AN ORGANIZED HEALTH CARE EDUCATION/TRAINING PROGRAM

## 2024-08-15 PROCEDURE — 3008F BODY MASS INDEX DOCD: CPT | Performed by: STUDENT IN AN ORGANIZED HEALTH CARE EDUCATION/TRAINING PROGRAM

## 2024-08-15 PROCEDURE — 3074F SYST BP LT 130 MM HG: CPT | Performed by: STUDENT IN AN ORGANIZED HEALTH CARE EDUCATION/TRAINING PROGRAM

## 2024-08-15 PROCEDURE — 3078F DIAST BP <80 MM HG: CPT | Performed by: STUDENT IN AN ORGANIZED HEALTH CARE EDUCATION/TRAINING PROGRAM

## 2024-08-15 PROCEDURE — 1036F TOBACCO NON-USER: CPT | Performed by: STUDENT IN AN ORGANIZED HEALTH CARE EDUCATION/TRAINING PROGRAM

## 2024-08-15 RX ORDER — SUCRALFATE 1 G/10ML
1 SUSPENSION ORAL 4 TIMES DAILY
Qty: 1200 ML | Refills: 0 | Status: SHIPPED | OUTPATIENT
Start: 2024-08-15 | End: 2024-09-14

## 2024-08-15 ASSESSMENT — PATIENT HEALTH QUESTIONNAIRE - PHQ9
1. LITTLE INTEREST OR PLEASURE IN DOING THINGS: NOT AT ALL
2. FEELING DOWN, DEPRESSED OR HOPELESS: NOT AT ALL
SUM OF ALL RESPONSES TO PHQ9 QUESTIONS 1 AND 2: 0

## 2024-08-15 ASSESSMENT — ENCOUNTER SYMPTOMS: DEPRESSION: 0

## 2024-08-15 NOTE — PROGRESS NOTES
"Subjective   Patient ID: Nicolasa Burger is a 59 y.o. female who presents for Establish Care (Having stomach issues-nauseated = x2 months).    HPI   New patient    Has has of ulcer gerd and n/v and stomach pain for 2 months, recent egd showe gastirtis with biopsied, noticeable on an off constipation and diarrhea, pain in epigastri region. On omeprazole. Alos notes high anxieyt and on zoloft. Likley related to IBS type symptoms vs ulcer. Eating ok. Certain Food appears to exacerbate symptoms       Review of Systems   All other systems reviewed and are negative.      Objective   /60 (BP Location: Right arm, Patient Position: Sitting, BP Cuff Size: Small adult)   Pulse 82   Ht 1.651 m (5' 5\")   Wt 62.1 kg (137 lb)   SpO2 97%   BMI 22.80 kg/m²     Physical Exam  Constitutional:       Appearance: Normal appearance.   HENT:      Head: Normocephalic and atraumatic.      Right Ear: Tympanic membrane and ear canal normal.      Left Ear: Tympanic membrane and ear canal normal.      Mouth/Throat:      Mouth: Mucous membranes are moist.      Pharynx: Oropharynx is clear.   Eyes:      Extraocular Movements: Extraocular movements intact.      Conjunctiva/sclera: Conjunctivae normal.      Pupils: Pupils are equal, round, and reactive to light.   Cardiovascular:      Rate and Rhythm: Normal rate and regular rhythm.      Pulses: Normal pulses.      Heart sounds: Normal heart sounds.   Pulmonary:      Effort: Pulmonary effort is normal.      Breath sounds: Normal breath sounds.   Abdominal:      General: Abdomen is flat. Bowel sounds are normal.      Palpations: Abdomen is soft.   Musculoskeletal:         General: Normal range of motion.      Cervical back: Normal range of motion and neck supple.   Skin:     General: Skin is warm and dry.      Capillary Refill: Capillary refill takes 2 to 3 seconds.   Neurological:      General: No focal deficit present.      Mental Status: She is alert and oriented to person, place, and " time. Mental status is at baseline.   Psychiatric:         Mood and Affect: Mood normal.         Behavior: Behavior normal.         Thought Content: Thought content normal.         Judgment: Judgment normal.         Assessment/Plan   1. Gastroesophageal reflux disease, unspecified whether esophagitis present    - Calprotectin Stool; Future  - Pancreatic Elastase, Fecal; Future  - sucralfate (Carafate) 100 mg/mL suspension; Take 10 mL (1 g) by mouth 4 times a day.  Dispense: 1200 mL; Refill: 0  - has hx of alcoholism and pancreatisi could be PI vs IBS, will order stool tets  - start carafate to help with symptoms    Follow up in 2-3 months for physical

## 2024-08-16 ENCOUNTER — OFFICE VISIT (OUTPATIENT)
Dept: ORTHOPEDIC SURGERY | Facility: CLINIC | Age: 59
End: 2024-08-16
Payer: COMMERCIAL

## 2024-08-16 ENCOUNTER — HOSPITAL ENCOUNTER (OUTPATIENT)
Dept: RADIOLOGY | Facility: CLINIC | Age: 59
Discharge: HOME | End: 2024-08-16
Payer: COMMERCIAL

## 2024-08-16 VITALS — BODY MASS INDEX: 22.34 KG/M2 | HEIGHT: 66 IN | WEIGHT: 139 LBS

## 2024-08-16 DIAGNOSIS — M25.552 HIP PAIN, ACUTE, LEFT: ICD-10-CM

## 2024-08-16 DIAGNOSIS — S76.012A STRAIN OF LEFT HIP ADDUCTOR MUSCLE, INITIAL ENCOUNTER: Primary | ICD-10-CM

## 2024-08-16 DIAGNOSIS — M79.671 FOOT PAIN, RIGHT: ICD-10-CM

## 2024-08-16 DIAGNOSIS — S93.601A RIGHT FOOT SPRAIN, INITIAL ENCOUNTER: ICD-10-CM

## 2024-08-16 DIAGNOSIS — M25.551 HIP PAIN, ACUTE, RIGHT: ICD-10-CM

## 2024-08-16 PROCEDURE — 73502 X-RAY EXAM HIP UNI 2-3 VIEWS: CPT | Mod: LT

## 2024-08-16 PROCEDURE — 99213 OFFICE O/P EST LOW 20 MIN: CPT

## 2024-08-16 PROCEDURE — 73630 X-RAY EXAM OF FOOT: CPT | Mod: RT

## 2024-08-16 NOTE — PROGRESS NOTES
Subjective    Patient ID: Nicolasa Burger is a 59 y.o. female.    Chief Complaint: Injury of the Right Foot (DOI:8/15/24/TWISTED HER FOOT) and Injury of the Left Hip     HPI  This is a pleasant 59-year-old female presenting to the walk-in injury clinic for evaluation of left groin pain and right foot pain.  Patient states that yesterday she was wearing wedge type shoes, when she excellently stepped on a rock causing her to twist her right foot and felt a strain in her left hip.  In regards to her right foot, she has not noticed any significant bruising or swelling.  Pain is focal to medial dorsal foot near the base of first metatarsal.  She does have some degree of numbness and paresthesia.  States that it has been difficult for her to fully weight-bear on right lower extremity.  In regards to her left hip.  Pain is focal to her groin fold.  States that it is myofascial in nature.  She denies any lateral hip pain.  Denies any limited range of motion of the hip.  It has been difficult for her to sleep due to the pain.  She has been applying ice and taking ibuprofen as well as elevating right lower extremity.    The patient's past medical, surgical, family, and social history as well as allergies and medications were reviewed and updated in the chart.    Objective   Ortho Exam  Pleasant in no acute distress.  Walks in the office today with a mildly antalgic gait, unable to fully weight-bear on right lower extremity.  Right foot and ankle appearing without soft tissue swelling erythema or ecchymosis.  There is no warmth upon touch.  She is tender upon palpation of dorsal medial and lateral midfoot, more significant to medial midfoot.  No tenderness upon palpation of medial or lateral malleolus.  She has full range of motion of right ankle, but pain is elicited in her midfoot with movement.  She has adequate strength with resisted dorsiflexion and plantarflexion.  The ankle joint is stable.  She can wiggle right toe  digits.  She does have bilateral bunions.  Sensation is intact to light touch.    Left groin and hip appearing without soft tissue swelling erythema or ecchymosis.  She is tender upon palpation of left groin adductor muscles.  Pain is superficial in nature.  She has full range of motion of her left hip, flexion to 100 degrees without discomfort.  She has adequate internal and external rotation with some degree of pain elicited in her groin.  There is no tenderness upon palpation of left greater trochanter.    Image Results:  Multiple view x-rays of the right foot obtained today personally reviewed, without evidence of acute fracture or dislocation.  There does seem to be some degenerative arthritic changes of the first metatarsal.    Multiple view x-rays of the left hip obtained today personally reviewed, without evidence of acute fracture or dislocation.  The hip joint spaces well-maintained.    Assessment/Plan   Encounter Diagnoses:  Strain of left hip adductor muscle, initial encounter    Hip pain, acute, left    Right foot sprain, initial encounter    Plan: Discussion with patient in regards to diagnoses of left hip adductor strain, as well as right foot sprain.  We did review left hip and right foot x-rays.  Conservative treatment options were discussed.  In regards to right foot sprain, I have placed patient in a short walking boot to help with ambulation, she is having difficulty fully weightbearing on right lower extremity.  Explained to patient that I would wear walking boot for at least 2 weeks, and then she can begin to wean out of boot as tolerated.  She can weight-bear as tolerated with boot.  She can remove boot for hygiene purposes, when resting in the comfort of her home, and during sleep.  In regards to her left groin adductor muscle strain, patient was advised to avoid aggravating activities.  She can apply ice.  Explained that both injuries will take a few weeks to decrease inflammation and pain.   She can follow-up in 3 to 4 weeks if still symptomatic and not improving.    Orders Placed This Encounter    XR hip left with pelvis when performed 2 or 3 views

## 2024-08-23 DIAGNOSIS — K21.9 GASTROESOPHAGEAL REFLUX DISEASE, UNSPECIFIED WHETHER ESOPHAGITIS PRESENT: ICD-10-CM

## 2024-08-23 DIAGNOSIS — R13.10 DYSPHAGIA, UNSPECIFIED TYPE: Primary | ICD-10-CM

## 2024-08-23 RX ORDER — SUCRALFATE 1 G/1
1 TABLET ORAL
Qty: 120 TABLET | Refills: 11 | Status: SHIPPED | OUTPATIENT
Start: 2024-08-23 | End: 2025-08-23

## 2024-08-23 NOTE — PROGRESS NOTES
Subjective   Reason for Visit: Nicolasa Burger is an 59 y.o. female here for a Medicare Wellness visit.               HPI    Patient Care Team:  Harvinder Hussein DO as PCP - General (Internal Medicine)  Andres Vega DO as PCP - [facility] PCP  Andres Atkins MD as Consulting Physician (Cardiology)  CYN Ritter-CNP as Nurse Practitioner (Internal Medicine)     Review of Systems    Objective   Vitals:  There were no vitals taken for this visit.      Physical Exam    Assessment/Plan   Problem List Items Addressed This Visit    None

## 2024-10-17 PROBLEM — Z86.79 HISTORY OF HYPERTENSION: Status: RESOLVED | Noted: 2024-07-23 | Resolved: 2024-10-17

## 2024-10-17 PROBLEM — S09.90XA INJURY OF HEAD: Status: RESOLVED | Noted: 2023-04-02 | Resolved: 2024-10-17

## 2024-10-17 PROBLEM — M19.90 ARTHRITIS: Status: RESOLVED | Noted: 2023-03-21 | Resolved: 2024-10-17

## 2024-10-17 PROBLEM — R06.02 SHORTNESS OF BREATH AT REST: Status: RESOLVED | Noted: 2023-03-21 | Resolved: 2024-10-17

## 2024-10-17 PROBLEM — J31.0 RHINITIS: Status: RESOLVED | Noted: 2023-03-21 | Resolved: 2024-10-17

## 2024-10-17 PROBLEM — Z86.19 HISTORY OF MUMPS: Status: RESOLVED | Noted: 2024-07-23 | Resolved: 2024-10-17

## 2024-10-17 PROBLEM — F90.0 ATTENTION DEFICIT HYPERACTIVITY DISORDER, PREDOMINANTLY INATTENTIVE TYPE: Status: RESOLVED | Noted: 2023-04-07 | Resolved: 2024-10-17

## 2024-10-17 PROBLEM — Z86.39 HISTORY OF ELEVATED LIPIDS: Status: RESOLVED | Noted: 2024-07-23 | Resolved: 2024-10-17

## 2024-10-17 PROBLEM — R06.02 SHORTNESS OF BREATH: Status: RESOLVED | Noted: 2023-04-02 | Resolved: 2024-10-17

## 2024-10-17 PROBLEM — E78.5 HYPERLIPIDEMIA: Status: ACTIVE | Noted: 2023-03-21

## 2024-10-17 PROBLEM — M25.50 JOINT PAIN: Status: RESOLVED | Noted: 2023-03-21 | Resolved: 2024-10-17

## 2024-10-17 PROBLEM — R06.09 OTHER FORMS OF DYSPNEA: Status: RESOLVED | Noted: 2023-03-21 | Resolved: 2024-10-17

## 2024-10-17 PROBLEM — M79.643 PAIN OF HAND: Status: RESOLVED | Noted: 2024-07-23 | Resolved: 2024-10-17

## 2024-10-17 PROBLEM — Z86.19 HISTORY OF VARICELLA: Status: RESOLVED | Noted: 2024-07-23 | Resolved: 2024-10-17

## 2024-10-17 PROBLEM — J30.9 ALLERGIC RHINITIS: Status: RESOLVED | Noted: 2023-04-07 | Resolved: 2024-10-17

## 2024-11-04 ENCOUNTER — APPOINTMENT (OUTPATIENT)
Dept: PRIMARY CARE | Facility: CLINIC | Age: 59
End: 2024-11-04
Payer: COMMERCIAL

## 2024-11-04 ENCOUNTER — LAB (OUTPATIENT)
Dept: LAB | Facility: LAB | Age: 59
End: 2024-11-04
Payer: COMMERCIAL

## 2024-11-04 VITALS
SYSTOLIC BLOOD PRESSURE: 124 MMHG | BODY MASS INDEX: 22.76 KG/M2 | DIASTOLIC BLOOD PRESSURE: 74 MMHG | WEIGHT: 141 LBS | OXYGEN SATURATION: 97 % | HEART RATE: 74 BPM

## 2024-11-04 DIAGNOSIS — U09.9 POST COVID-19 CONDITION, UNSPECIFIED: ICD-10-CM

## 2024-11-04 DIAGNOSIS — K21.9 GASTROESOPHAGEAL REFLUX DISEASE, UNSPECIFIED WHETHER ESOPHAGITIS PRESENT: ICD-10-CM

## 2024-11-04 DIAGNOSIS — M16.12 OSTEOARTHRITIS OF LEFT HIP, UNSPECIFIED OSTEOARTHRITIS TYPE: Primary | ICD-10-CM

## 2024-11-04 DIAGNOSIS — R09.89 SINUS COMPLAINT: ICD-10-CM

## 2024-11-04 PROBLEM — J96.10 CHRONIC RESPIRATORY FAILURE: Status: RESOLVED | Noted: 2023-03-21 | Resolved: 2024-11-04

## 2024-11-04 PROBLEM — K86.1 CHRONIC PANCREATITIS (MULTI): Status: RESOLVED | Noted: 2023-03-21 | Resolved: 2024-11-04

## 2024-11-04 PROBLEM — F10.931 ALCOHOL WITHDRAWAL DELIRIUM (MULTI): Status: RESOLVED | Noted: 2024-07-23 | Resolved: 2024-11-04

## 2024-11-04 PROBLEM — J96.01 ACUTE RESPIRATORY FAILURE WITH HYPOXIA (MULTI): Status: RESOLVED | Noted: 2023-03-21 | Resolved: 2024-11-04

## 2024-11-04 PROCEDURE — 82653 EL-1 FECAL QUANTITATIVE: CPT

## 2024-11-04 PROCEDURE — 83993 ASSAY FOR CALPROTECTIN FECAL: CPT

## 2024-11-04 PROCEDURE — 99214 OFFICE O/P EST MOD 30 MIN: CPT | Performed by: STUDENT IN AN ORGANIZED HEALTH CARE EDUCATION/TRAINING PROGRAM

## 2024-11-04 PROCEDURE — 1036F TOBACCO NON-USER: CPT | Performed by: STUDENT IN AN ORGANIZED HEALTH CARE EDUCATION/TRAINING PROGRAM

## 2024-11-04 PROCEDURE — 3078F DIAST BP <80 MM HG: CPT | Performed by: STUDENT IN AN ORGANIZED HEALTH CARE EDUCATION/TRAINING PROGRAM

## 2024-11-04 PROCEDURE — 3074F SYST BP LT 130 MM HG: CPT | Performed by: STUDENT IN AN ORGANIZED HEALTH CARE EDUCATION/TRAINING PROGRAM

## 2024-11-04 RX ORDER — AZELASTINE 1 MG/ML
2 SPRAY, METERED NASAL 2 TIMES DAILY
Qty: 90 ML | Refills: 3 | Status: SHIPPED | OUTPATIENT
Start: 2024-11-04 | End: 2025-11-04

## 2024-11-04 ASSESSMENT — PATIENT HEALTH QUESTIONNAIRE - PHQ9
SUM OF ALL RESPONSES TO PHQ9 QUESTIONS 1 AND 2: 0
1. LITTLE INTEREST OR PLEASURE IN DOING THINGS: NOT AT ALL
2. FEELING DOWN, DEPRESSED OR HOPELESS: NOT AT ALL

## 2024-11-04 ASSESSMENT — ENCOUNTER SYMPTOMS: DEPRESSION: 0

## 2024-11-04 NOTE — PROGRESS NOTES
Subjective   Patient ID: Nicolasa Burger is a 59 y.o. female who presents for Groin Pain (LT side/2 months now /Getting worse/).    HPI     2 month sof hip pain on left side groin, sever pain on external and internal rotation, has sever bone oa in back. X ray shows b/l OA. L>R    Review of Systems   All other systems reviewed and are negative.      Objective   /74 (BP Location: Right arm)   Pulse 74   Wt 64 kg (141 lb)   SpO2 97%   BMI 22.76 kg/m²     Physical Exam  Constitutional:       Appearance: Normal appearance.   HENT:      Head: Normocephalic and atraumatic.      Right Ear: Tympanic membrane and ear canal normal.      Left Ear: Tympanic membrane and ear canal normal.      Mouth/Throat:      Mouth: Mucous membranes are moist.      Pharynx: Oropharynx is clear.   Eyes:      Extraocular Movements: Extraocular movements intact.      Conjunctiva/sclera: Conjunctivae normal.      Pupils: Pupils are equal, round, and reactive to light.   Cardiovascular:      Rate and Rhythm: Normal rate and regular rhythm.      Pulses: Normal pulses.      Heart sounds: Normal heart sounds.   Pulmonary:      Effort: Pulmonary effort is normal.      Breath sounds: Normal breath sounds.   Abdominal:      General: Abdomen is flat. Bowel sounds are normal.      Palpations: Abdomen is soft.   Musculoskeletal:         General: Normal range of motion.      Cervical back: Normal range of motion and neck supple.   Skin:     General: Skin is warm and dry.      Capillary Refill: Capillary refill takes 2 to 3 seconds.   Neurological:      General: No focal deficit present.      Mental Status: She is alert and oriented to person, place, and time. Mental status is at baseline.   Psychiatric:         Mood and Affect: Mood normal.         Behavior: Behavior normal.         Thought Content: Thought content normal.         Judgment: Judgment normal.         Assessment/Plan   1. Post covid-19 condition, unspecified    - azelastine (Astelin)  137 mcg (0.1 %) nasal spray; Administer 2 sprays into each nostril 2 times a day.  Dispense: 90 mL; Refill: 3    2. Sinus complaint    - azelastine (Astelin) 137 mcg (0.1 %) nasal spray; Administer 2 sprays into each nostril 2 times a day.  Dispense: 90 mL; Refill: 3    3. Osteoarthritis of left hip, unspecified osteoarthritis type (Primary)  - suspect hip oa as primary pain  - refer to orhto  - Referral to Orthopaedic Surgery; Future

## 2024-11-05 ENCOUNTER — APPOINTMENT (OUTPATIENT)
Dept: CARDIOLOGY | Facility: CLINIC | Age: 59
End: 2024-11-05
Payer: COMMERCIAL

## 2024-11-05 VITALS
SYSTOLIC BLOOD PRESSURE: 152 MMHG | OXYGEN SATURATION: 97 % | WEIGHT: 143.4 LBS | HEART RATE: 74 BPM | DIASTOLIC BLOOD PRESSURE: 94 MMHG | HEIGHT: 66 IN | BODY MASS INDEX: 23.05 KG/M2

## 2024-11-05 DIAGNOSIS — Q21.12 PFO (PATENT FORAMEN OVALE) (HHS-HCC): ICD-10-CM

## 2024-11-05 DIAGNOSIS — R00.2 PALPITATIONS: ICD-10-CM

## 2024-11-05 DIAGNOSIS — I10 BENIGN ESSENTIAL HTN: ICD-10-CM

## 2024-11-05 PROCEDURE — 93000 ELECTROCARDIOGRAM COMPLETE: CPT | Performed by: INTERNAL MEDICINE

## 2024-11-05 PROCEDURE — 3008F BODY MASS INDEX DOCD: CPT | Performed by: INTERNAL MEDICINE

## 2024-11-05 PROCEDURE — 99214 OFFICE O/P EST MOD 30 MIN: CPT | Performed by: INTERNAL MEDICINE

## 2024-11-05 PROCEDURE — 3080F DIAST BP >= 90 MM HG: CPT | Performed by: INTERNAL MEDICINE

## 2024-11-05 PROCEDURE — 1036F TOBACCO NON-USER: CPT | Performed by: INTERNAL MEDICINE

## 2024-11-05 PROCEDURE — 3077F SYST BP >= 140 MM HG: CPT | Performed by: INTERNAL MEDICINE

## 2024-11-05 NOTE — LETTER
November 5, 2024     Harvinder Hussein DO  [address] [facility] [address]    Patient: Nicolasa Burger   YOB: 1965   Date of Visit: 11/5/2024       Dear Dr. Harvinder Hussein DO:    Thank you for referring Nicolasa Burger to me for evaluation. Below are my notes for this consultation.  If you have questions, please do not hesitate to call me. I look forward to following your patient along with you.       Sincerely,     Andres Atkins MD      CC: No Recipients  ______________________________________________________________________________________        [facility] Cardiology Outpatient Follow-up Visit     Reason for Visit: PFO, CVA     HPI: Nicolasa Burger is a 59 y.o.  female who presents today for followup.      Patient is a 59-year-old female with a history of hypertension, dyslipidemia who initially presented in November 2021 with severe COVID pneumonia. This was complicated by acute stroke which was treated with TPA. CT of the head confirmed a right parietal infarct. She had residual left facial droop, dysarthria and left-sided weakness which is since resolved. She is placed on aspirin, high intensity statin and low-dose Xarelto. She underwent transthoracic echo which demonstrated atrial septal aneurysm with PFO. She underwent percutaneous PFO closure with a 30 mm Cardioform device March 2022. She denies any chest discomfort.  She denies any palpitations.  She admits to an occasional shortness of breath episode.  She is taking inhalers for this.  She denies any lightheadedness, syncope, orthopnea, PND, lower extremity edema.      Transthoracic echo 2/10/2022: Normal LV function, atrial septal aneurysm with PFO. 14-day event recorder negative for atrial fibrillation. Twelve-lead ECG 2/18/2022: Normal sinus rhythm, left atrial enlargement. 4/19/2022 echo: Normal LV function, PFO occluder in place without shunt based on color flow and bubble study. 6/24/22 echo: Normal LV function, PFO occluder in place without  shunt based on color flow and bubble study. June 2022 event recorder: No significant arrhythmias. 1 episode of sinus tachycardia at 126 bpm. 6/8/2023 echo: Ejection fraction 60 to 65%, impaired relaxation, PFO closure device in place, no evidence of PFO.     Past Medical History:   She has a past medical history of Acute upper respiratory infection, unspecified (11/01/2021), CVA (cerebral vascular accident) (Multi), Dysphagia, Effusion, unspecified knee (07/02/2021), Encounter for screening for malignant neoplasm of colon (05/06/2015), GERD (gastroesophageal reflux disease), Pain in left knee (07/06/2021), Pain in left knee (09/07/2017), Pain in left lower leg (07/02/2021), Pain in right finger(s), Pancreatitis ([facility]), Personal history of other diseases of the nervous system and sense organs (06/28/2016), Personal history of other diseases of the nervous system and sense organs, Personal history of other diseases of the respiratory system (02/10/2021), Personal history of other endocrine, nutritional and metabolic disease, Personal history of other infectious and parasitic diseases, Personal history of other infectious and parasitic diseases, Personal history of other infectious and parasitic diseases (07/13/2021), Personal history of other infectious and parasitic diseases (11/04/2021), Personal history of other medical treatment, Personal history of other medical treatment, Personal history of other specified conditions (07/19/2021), Right upper quadrant pain (07/29/2015), Segmental and somatic dysfunction of cervical region (12/12/2015), Sleep apnea, Strain of other muscle(s) and tendon(s) at lower leg level, left leg, initial encounter (07/02/2021), Strain of unspecified muscle(s) and tendon(s) at lower leg level, left leg, initial encounter (07/02/2021), Unilateral primary osteoarthritis, left knee (10/20/2016), Unspecified acute conjunctivitis, bilateral (07/12/2017), Unspecified tear of unspecified  meniscus, current injury, left knee, initial encounter (2017), and Urinary tract infection, site not specified (2021).    Surgical History:   She has a past surgical history that includes  section, classic (2015); Endometrial ablation (2015); Belt abdominoplasty (2015); Other surgical history (2015); Appendectomy (2015); Other surgical history (2021); Other surgical history (2022); CT angio head w and wo IV contrast (2021); and CT angio neck (2021).    Family History:   Family History   Problem Relation Name Age of Onset   • Anxiety disorder Mother     • Allergies Father          seasonal   • Alcohol abuse Father          severe   • Yun's esophagus Father     • Breast cancer Sister       Allergies:  Patient has no known allergies.     Social History:   · Caffeine use (V49.89) (Z78.9)   · Former smoker (V15.82) (Z87.891)   · No drug use   · Occasional alcohol use    Prior Cardiovascular Testing (Personally Reviewed):     Review of Systems:  Review of Systems   All other systems reviewed and are negative.      Outpatient Medications:    Current Outpatient Medications:   •  albuterol 2.5 mg /3 mL (0.083 %) nebulizer solution, Take 3 mL (2.5 mg) by nebulization every 4 hours if needed for wheezing., Disp: , Rfl:   •  albuterol 90 mcg/actuation inhaler, Inhale 2 puffs every 4 hours if needed., Disp: , Rfl:   •  aspirin 81 mg chewable tablet, Chew 1 tablet (81 mg) once daily., Disp: , Rfl:   •  azelastine (Astelin) 137 mcg (0.1 %) nasal spray, Administer 2 sprays into each nostril 2 times a day., Disp: 90 mL, Rfl: 3  •  b complex 0.4 mg tablet, Take 1 tablet by mouth once daily., Disp: , Rfl:   •  collagen-biotin-ascorbic acid (Collagen 1500 Plus C) 500 mg-800 mcg- 50 mg capsule, Take 1 capsule by mouth once daily., Disp: , Rfl:   •  cyanocobalamin, vitamin B-12, 1,000 mcg lozenge, 1 lozenge po qday, Disp: 90 lozenge, Rfl: 3  •  cyclobenzaprine  "(Flexeril) 10 mg tablet, Take 1 tablet (10 mg) by mouth 3 times a day as needed for muscle spasms., Disp: 30 tablet, Rfl: 2  •  hydrOXYzine HCL (Atarax) 25 mg tablet, Take 1 tablet (25 mg) by mouth once daily at bedtime., Disp: 90 tablet, Rfl: 3  •  magnesium oxide (Mag-Ox) 400 mg tablet, Take 1 tablet (400 mg) by mouth once daily., Disp: , Rfl:   •  metoprolol succinate XL (Toprol-XL) 50 mg 24 hr tablet, Take 1 tablet (50 mg) by mouth once daily. Do not crush or chew., Disp: 90 tablet, Rfl: 3  •  multivitamin with minerals tablet, Take 1 tablet by mouth once daily., Disp: , Rfl:   •  omeprazole (PriLOSEC) 40 mg DR capsule, Take 1 capsule (40 mg) by mouth 2 times a day before meals. Do not crush or chew., Disp: 180 capsule, Rfl: 3  •  primidone (Mysoline) 50 mg tablet, Take 1 tablet (50 mg) by mouth 2 times a day as needed., Disp: , Rfl:   •  sertraline (Zoloft) 100 mg tablet, Take 1.5 tablets (150 mg) by mouth once daily., Disp: 135 tablet, Rfl: 3  •  sucralfate (Carafate) 1 gram tablet, Take 1 tablet (1 g) by mouth 4 times a day before meals., Disp: 120 tablet, Rfl: 11     Last Recorded Vitals  BP (!) 152/94 (BP Location: Left arm, Patient Position: Sitting, BP Cuff Size: Adult)   Pulse 74   Ht 1.676 m (5' 6\")   Wt 65 kg (143 lb 6.4 oz)   SpO2 97%   BMI 23.15 kg/m²     Physical Exam:    Physical Exam  Vitals reviewed.   Constitutional:       Appearance: Normal appearance.   HENT:      Head: Normocephalic and atraumatic.      Mouth/Throat:      Mouth: Mucous membranes are moist.      Pharynx: Oropharynx is clear.   Eyes:      Extraocular Movements: Extraocular movements intact.      Conjunctiva/sclera: Conjunctivae normal.   Cardiovascular:      Rate and Rhythm: Normal rate and regular rhythm.      Pulses: Normal pulses.      Heart sounds: Normal heart sounds.   Pulmonary:      Effort: Pulmonary effort is normal.      Breath sounds: Normal breath sounds.   Abdominal:      General: Bowel sounds are normal.      " "Palpations: Abdomen is soft.   Musculoskeletal:         General: No swelling.      Cervical back: Neck supple.   Skin:     General: Skin is warm and dry.   Neurological:      General: No focal deficit present.      Mental Status: She is alert.   Psychiatric:         Mood and Affect: Mood normal.         Behavior: Behavior normal.         Lab/Radiology/Diagnostic Review:    Labs    Lab Results   Component Value Date    GLUCOSE 141 (H) 12/08/2023    CALCIUM 10.5 (H) 12/08/2023     12/08/2023    K 4.1 12/08/2023    CO2 21 12/08/2023     12/08/2023    BUN 23 12/08/2023    CREATININE 0.74 12/08/2023       Lab Results   Component Value Date    WBC 12.5 (H) 12/08/2023    HGB 13.9 12/08/2023    HCT 41.3 12/08/2023    MCV 93 12/08/2023     12/08/2023       Lab Results   Component Value Date    CHOL 249 (H) 12/15/2022    CHOL 193 11/08/2021    CHOL 275 (H) 08/29/2019     Lab Results   Component Value Date    HDL 63.6 12/15/2022    HDL 34.2 (A) 11/08/2021    HDL 61.3 08/29/2019     No results found for: \"LDLCALC\"  Lab Results   Component Value Date    TRIG 76 12/15/2022    TRIG 134 11/08/2021    TRIG 460 (H) 08/29/2019     No components found for: \"CHOLHDL\"    Lab Results   Component Value Date    BNP 95 11/08/2021       Lab Results   Component Value Date    TSH 3.21 12/28/2022       Assessment:   Patient's palpitations have essentially resolved.  Her event recorder was negative for arrhythmias.      Patient will stay on aspirin.       Continue metoprolol succinate 50 mg a day. Attempts to increase metoprolol to 75 mg a day resulted in syncope.     Patient will stay on statin therapy.     Patient will follow up with us in 2 years or sooner with more problems..     Andres Atkins MD      "

## 2024-11-05 NOTE — LETTER
November 5, 2024     No Recipients    Patient: Nicolasa Burger   YOB: 1965   Date of Visit: 11/5/2024       Dear Dr. Anderson Recipients:    Thank you for referring Nicolasa Burger to me for evaluation. Below are my notes for this consultation.  If you have questions, please do not hesitate to call me. I look forward to following your patient along with you.       Sincerely,     Andres Atkins MD      CC: No Recipients  ______________________________________________________________________________________        [facility] Cardiology Outpatient Follow-up Visit     Reason for Visit: PFO, CVA     HPI: Nicolasa Burger is a 59 y.o.  female who presents today for followup.      Patient is a 59-year-old female with a history of hypertension, dyslipidemia who initially presented in November 2021 with severe COVID pneumonia. This was complicated by acute stroke which was treated with TPA. CT of the head confirmed a right parietal infarct. She had residual left facial droop, dysarthria and left-sided weakness which is since resolved. She is placed on aspirin, high intensity statin and low-dose Xarelto. She underwent transthoracic echo which demonstrated atrial septal aneurysm with PFO. She underwent percutaneous PFO closure with a 30 mm Cardioform device March 2022. She denies any chest discomfort.  She denies any palpitations.  She admits to an occasional shortness of breath episode.  She is taking inhalers for this.  She denies any lightheadedness, syncope, orthopnea, PND, lower extremity edema.      Transthoracic echo 2/10/2022: Normal LV function, atrial septal aneurysm with PFO. 14-day event recorder negative for atrial fibrillation. Twelve-lead ECG 2/18/2022: Normal sinus rhythm, left atrial enlargement. 4/19/2022 echo: Normal LV function, PFO occluder in place without shunt based on color flow and bubble study. 6/24/22 echo: Normal LV function, PFO occluder in place without shunt based on color flow and bubble  study. June 2022 event recorder: No significant arrhythmias. 1 episode of sinus tachycardia at 126 bpm. 6/8/2023 echo: Ejection fraction 60 to 65%, impaired relaxation, PFO closure device in place, no evidence of PFO.     Past Medical History:   She has a past medical history of Acute upper respiratory infection, unspecified (11/01/2021), CVA (cerebral vascular accident) (Multi), Dysphagia, Effusion, unspecified knee (07/02/2021), Encounter for screening for malignant neoplasm of colon (05/06/2015), GERD (gastroesophageal reflux disease), Pain in left knee (07/06/2021), Pain in left knee (09/07/2017), Pain in left lower leg (07/02/2021), Pain in right finger(s), Pancreatitis ([facility]), Personal history of other diseases of the nervous system and sense organs (06/28/2016), Personal history of other diseases of the nervous system and sense organs, Personal history of other diseases of the respiratory system (02/10/2021), Personal history of other endocrine, nutritional and metabolic disease, Personal history of other infectious and parasitic diseases, Personal history of other infectious and parasitic diseases, Personal history of other infectious and parasitic diseases (07/13/2021), Personal history of other infectious and parasitic diseases (11/04/2021), Personal history of other medical treatment, Personal history of other medical treatment, Personal history of other specified conditions (07/19/2021), Right upper quadrant pain (07/29/2015), Segmental and somatic dysfunction of cervical region (12/12/2015), Sleep apnea, Strain of other muscle(s) and tendon(s) at lower leg level, left leg, initial encounter (07/02/2021), Strain of unspecified muscle(s) and tendon(s) at lower leg level, left leg, initial encounter (07/02/2021), Unilateral primary osteoarthritis, left knee (10/20/2016), Unspecified acute conjunctivitis, bilateral (07/12/2017), Unspecified tear of unspecified meniscus, current injury, left knee, initial  encounter (2017), and Urinary tract infection, site not specified (2021).    Surgical History:   She has a past surgical history that includes  section, classic (2015); Endometrial ablation (2015); Belt abdominoplasty (2015); Other surgical history (2015); Appendectomy (2015); Other surgical history (2021); Other surgical history (2022); CT angio head w and wo IV contrast (2021); and CT angio neck (2021).    Family History:   Family History   Problem Relation Name Age of Onset   • Anxiety disorder Mother     • Allergies Father          seasonal   • Alcohol abuse Father          severe   • Yun's esophagus Father     • Breast cancer Sister       Allergies:  Patient has no known allergies.     Social History:   · Caffeine use (V49.89) (Z78.9)   · Former smoker (V15.82) (Z87.891)   · No drug use   · Occasional alcohol use    Prior Cardiovascular Testing (Personally Reviewed):     Review of Systems:  Review of Systems   All other systems reviewed and are negative.      Outpatient Medications:    Current Outpatient Medications:   •  albuterol 2.5 mg /3 mL (0.083 %) nebulizer solution, Take 3 mL (2.5 mg) by nebulization every 4 hours if needed for wheezing., Disp: , Rfl:   •  albuterol 90 mcg/actuation inhaler, Inhale 2 puffs every 4 hours if needed., Disp: , Rfl:   •  aspirin 81 mg chewable tablet, Chew 1 tablet (81 mg) once daily., Disp: , Rfl:   •  azelastine (Astelin) 137 mcg (0.1 %) nasal spray, Administer 2 sprays into each nostril 2 times a day., Disp: 90 mL, Rfl: 3  •  b complex 0.4 mg tablet, Take 1 tablet by mouth once daily., Disp: , Rfl:   •  collagen-biotin-ascorbic acid (Collagen 1500 Plus C) 500 mg-800 mcg- 50 mg capsule, Take 1 capsule by mouth once daily., Disp: , Rfl:   •  cyanocobalamin, vitamin B-12, 1,000 mcg lozenge, 1 lozenge po qday, Disp: 90 lozenge, Rfl: 3  •  cyclobenzaprine (Flexeril) 10 mg tablet, Take 1 tablet (10  "mg) by mouth 3 times a day as needed for muscle spasms., Disp: 30 tablet, Rfl: 2  •  hydrOXYzine HCL (Atarax) 25 mg tablet, Take 1 tablet (25 mg) by mouth once daily at bedtime., Disp: 90 tablet, Rfl: 3  •  magnesium oxide (Mag-Ox) 400 mg tablet, Take 1 tablet (400 mg) by mouth once daily., Disp: , Rfl:   •  metoprolol succinate XL (Toprol-XL) 50 mg 24 hr tablet, Take 1 tablet (50 mg) by mouth once daily. Do not crush or chew., Disp: 90 tablet, Rfl: 3  •  multivitamin with minerals tablet, Take 1 tablet by mouth once daily., Disp: , Rfl:   •  omeprazole (PriLOSEC) 40 mg DR capsule, Take 1 capsule (40 mg) by mouth 2 times a day before meals. Do not crush or chew., Disp: 180 capsule, Rfl: 3  •  primidone (Mysoline) 50 mg tablet, Take 1 tablet (50 mg) by mouth 2 times a day as needed., Disp: , Rfl:   •  sertraline (Zoloft) 100 mg tablet, Take 1.5 tablets (150 mg) by mouth once daily., Disp: 135 tablet, Rfl: 3  •  sucralfate (Carafate) 1 gram tablet, Take 1 tablet (1 g) by mouth 4 times a day before meals., Disp: 120 tablet, Rfl: 11     Last Recorded Vitals  BP (!) 152/94 (BP Location: Left arm, Patient Position: Sitting, BP Cuff Size: Adult)   Pulse 74   Ht 1.676 m (5' 6\")   Wt 65 kg (143 lb 6.4 oz)   SpO2 97%   BMI 23.15 kg/m²     Physical Exam:    Physical Exam  Vitals reviewed.   Constitutional:       Appearance: Normal appearance.   HENT:      Head: Normocephalic and atraumatic.      Mouth/Throat:      Mouth: Mucous membranes are moist.      Pharynx: Oropharynx is clear.   Eyes:      Extraocular Movements: Extraocular movements intact.      Conjunctiva/sclera: Conjunctivae normal.   Cardiovascular:      Rate and Rhythm: Normal rate and regular rhythm.      Pulses: Normal pulses.      Heart sounds: Normal heart sounds.   Pulmonary:      Effort: Pulmonary effort is normal.      Breath sounds: Normal breath sounds.   Abdominal:      General: Bowel sounds are normal.      Palpations: Abdomen is soft. " "  Musculoskeletal:         General: No swelling.      Cervical back: Neck supple.   Skin:     General: Skin is warm and dry.   Neurological:      General: No focal deficit present.      Mental Status: She is alert.   Psychiatric:         Mood and Affect: Mood normal.         Behavior: Behavior normal.         Lab/Radiology/Diagnostic Review:    Labs    Lab Results   Component Value Date    GLUCOSE 141 (H) 12/08/2023    CALCIUM 10.5 (H) 12/08/2023     12/08/2023    K 4.1 12/08/2023    CO2 21 12/08/2023     12/08/2023    BUN 23 12/08/2023    CREATININE 0.74 12/08/2023       Lab Results   Component Value Date    WBC 12.5 (H) 12/08/2023    HGB 13.9 12/08/2023    HCT 41.3 12/08/2023    MCV 93 12/08/2023     12/08/2023       Lab Results   Component Value Date    CHOL 249 (H) 12/15/2022    CHOL 193 11/08/2021    CHOL 275 (H) 08/29/2019     Lab Results   Component Value Date    HDL 63.6 12/15/2022    HDL 34.2 (A) 11/08/2021    HDL 61.3 08/29/2019     No results found for: \"LDLCALC\"  Lab Results   Component Value Date    TRIG 76 12/15/2022    TRIG 134 11/08/2021    TRIG 460 (H) 08/29/2019     No components found for: \"CHOLHDL\"    Lab Results   Component Value Date    BNP 95 11/08/2021       Lab Results   Component Value Date    TSH 3.21 12/28/2022       Assessment:   Patient's palpitations have essentially resolved.  Her event recorder was negative for arrhythmias.      Patient will stay on aspirin.       Continue metoprolol succinate 50 mg a day. Attempts to increase metoprolol to 75 mg a day resulted in syncope.     Patient will stay on statin therapy.     Patient will follow up with us in 2 years or sooner with more problems..     Andres Atkins MD    "

## 2024-11-05 NOTE — PROGRESS NOTES
[facility] Cardiology Outpatient Follow-up Visit     Reason for Visit: PFO, CVA     HPI: Nicolasa Burger is a 59 y.o.  female who presents today for followup.      Patient is a 59-year-old female with a history of hypertension, dyslipidemia who initially presented in November 2021 with severe COVID pneumonia. This was complicated by acute stroke which was treated with TPA. CT of the head confirmed a right parietal infarct. She had residual left facial droop, dysarthria and left-sided weakness which is since resolved. She is placed on aspirin, high intensity statin and low-dose Xarelto. She underwent transthoracic echo which demonstrated atrial septal aneurysm with PFO. She underwent percutaneous PFO closure with a 30 mm Cardioform device March 2022. She denies any chest discomfort.  She denies any palpitations.  She admits to an occasional shortness of breath episode.  She is taking inhalers for this.  She denies any lightheadedness, syncope, orthopnea, PND, lower extremity edema.      Transthoracic echo 2/10/2022: Normal LV function, atrial septal aneurysm with PFO. 14-day event recorder negative for atrial fibrillation. Twelve-lead ECG 2/18/2022: Normal sinus rhythm, left atrial enlargement. 4/19/2022 echo: Normal LV function, PFO occluder in place without shunt based on color flow and bubble study. 6/24/22 echo: Normal LV function, PFO occluder in place without shunt based on color flow and bubble study. June 2022 event recorder: No significant arrhythmias. 1 episode of sinus tachycardia at 126 bpm. 6/8/2023 echo: Ejection fraction 60 to 65%, impaired relaxation, PFO closure device in place, no evidence of PFO.     Past Medical History:   She has a past medical history of Acute upper respiratory infection, unspecified (11/01/2021), CVA (cerebral vascular accident) (Multi), Dysphagia, Effusion, unspecified knee (07/02/2021), Encounter for screening for malignant neoplasm of colon (05/06/2015), GERD  (gastroesophageal reflux disease), Pain in left knee (2021), Pain in left knee (2017), Pain in left lower leg (2021), Pain in right finger(s), Pancreatitis ([facility]), Personal history of other diseases of the nervous system and sense organs (2016), Personal history of other diseases of the nervous system and sense organs, Personal history of other diseases of the respiratory system (02/10/2021), Personal history of other endocrine, nutritional and metabolic disease, Personal history of other infectious and parasitic diseases, Personal history of other infectious and parasitic diseases, Personal history of other infectious and parasitic diseases (2021), Personal history of other infectious and parasitic diseases (2021), Personal history of other medical treatment, Personal history of other medical treatment, Personal history of other specified conditions (2021), Right upper quadrant pain (2015), Segmental and somatic dysfunction of cervical region (2015), Sleep apnea, Strain of other muscle(s) and tendon(s) at lower leg level, left leg, initial encounter (2021), Strain of unspecified muscle(s) and tendon(s) at lower leg level, left leg, initial encounter (2021), Unilateral primary osteoarthritis, left knee (10/20/2016), Unspecified acute conjunctivitis, bilateral (2017), Unspecified tear of unspecified meniscus, current injury, left knee, initial encounter (2017), and Urinary tract infection, site not specified (2021).    Surgical History:   She has a past surgical history that includes  section, classic (2015); Endometrial ablation (2015); Belt abdominoplasty (2015); Other surgical history (2015); Appendectomy (2015); Other surgical history (2021); Other surgical history (2022); CT angio head w and wo IV contrast (2021); and CT angio neck (2021).    Family History:    Family History   Problem Relation Name Age of Onset    Anxiety disorder Mother      Allergies Father          seasonal    Alcohol abuse Father          severe    Yun's esophagus Father      Breast cancer Sister       Allergies:  Patient has no known allergies.     Social History:   · Caffeine use (V49.89) (Z78.9)   · Former smoker (V15.82) (Z87.891)   · No drug use   · Occasional alcohol use    Prior Cardiovascular Testing (Personally Reviewed):     Review of Systems:  Review of Systems   All other systems reviewed and are negative.      Outpatient Medications:    Current Outpatient Medications:     albuterol 2.5 mg /3 mL (0.083 %) nebulizer solution, Take 3 mL (2.5 mg) by nebulization every 4 hours if needed for wheezing., Disp: , Rfl:     albuterol 90 mcg/actuation inhaler, Inhale 2 puffs every 4 hours if needed., Disp: , Rfl:     aspirin 81 mg chewable tablet, Chew 1 tablet (81 mg) once daily., Disp: , Rfl:     azelastine (Astelin) 137 mcg (0.1 %) nasal spray, Administer 2 sprays into each nostril 2 times a day., Disp: 90 mL, Rfl: 3    b complex 0.4 mg tablet, Take 1 tablet by mouth once daily., Disp: , Rfl:     collagen-biotin-ascorbic acid (Collagen 1500 Plus C) 500 mg-800 mcg- 50 mg capsule, Take 1 capsule by mouth once daily., Disp: , Rfl:     cyanocobalamin, vitamin B-12, 1,000 mcg lozenge, 1 lozenge po qday, Disp: 90 lozenge, Rfl: 3    cyclobenzaprine (Flexeril) 10 mg tablet, Take 1 tablet (10 mg) by mouth 3 times a day as needed for muscle spasms., Disp: 30 tablet, Rfl: 2    hydrOXYzine HCL (Atarax) 25 mg tablet, Take 1 tablet (25 mg) by mouth once daily at bedtime., Disp: 90 tablet, Rfl: 3    magnesium oxide (Mag-Ox) 400 mg tablet, Take 1 tablet (400 mg) by mouth once daily., Disp: , Rfl:     metoprolol succinate XL (Toprol-XL) 50 mg 24 hr tablet, Take 1 tablet (50 mg) by mouth once daily. Do not crush or chew., Disp: 90 tablet, Rfl: 3    multivitamin with minerals tablet, Take 1 tablet by mouth  "once daily., Disp: , Rfl:     omeprazole (PriLOSEC) 40 mg DR capsule, Take 1 capsule (40 mg) by mouth 2 times a day before meals. Do not crush or chew., Disp: 180 capsule, Rfl: 3    primidone (Mysoline) 50 mg tablet, Take 1 tablet (50 mg) by mouth 2 times a day as needed., Disp: , Rfl:     sertraline (Zoloft) 100 mg tablet, Take 1.5 tablets (150 mg) by mouth once daily., Disp: 135 tablet, Rfl: 3    sucralfate (Carafate) 1 gram tablet, Take 1 tablet (1 g) by mouth 4 times a day before meals., Disp: 120 tablet, Rfl: 11     Last Recorded Vitals  BP (!) 152/94 (BP Location: Left arm, Patient Position: Sitting, BP Cuff Size: Adult)   Pulse 74   Ht 1.676 m (5' 6\")   Wt 65 kg (143 lb 6.4 oz)   SpO2 97%   BMI 23.15 kg/m²     Physical Exam:    Physical Exam  Vitals reviewed.   Constitutional:       Appearance: Normal appearance.   HENT:      Head: Normocephalic and atraumatic.      Mouth/Throat:      Mouth: Mucous membranes are moist.      Pharynx: Oropharynx is clear.   Eyes:      Extraocular Movements: Extraocular movements intact.      Conjunctiva/sclera: Conjunctivae normal.   Cardiovascular:      Rate and Rhythm: Normal rate and regular rhythm.      Pulses: Normal pulses.      Heart sounds: Normal heart sounds.   Pulmonary:      Effort: Pulmonary effort is normal.      Breath sounds: Normal breath sounds.   Abdominal:      General: Bowel sounds are normal.      Palpations: Abdomen is soft.   Musculoskeletal:         General: No swelling.      Cervical back: Neck supple.   Skin:     General: Skin is warm and dry.   Neurological:      General: No focal deficit present.      Mental Status: She is alert.   Psychiatric:         Mood and Affect: Mood normal.         Behavior: Behavior normal.         Lab/Radiology/Diagnostic Review:    Labs    Lab Results   Component Value Date    GLUCOSE 141 (H) 12/08/2023    CALCIUM 10.5 (H) 12/08/2023     12/08/2023    K 4.1 12/08/2023    CO2 21 12/08/2023     12/08/2023 " "   BUN 23 12/08/2023    CREATININE 0.74 12/08/2023       Lab Results   Component Value Date    WBC 12.5 (H) 12/08/2023    HGB 13.9 12/08/2023    HCT 41.3 12/08/2023    MCV 93 12/08/2023     12/08/2023       Lab Results   Component Value Date    CHOL 249 (H) 12/15/2022    CHOL 193 11/08/2021    CHOL 275 (H) 08/29/2019     Lab Results   Component Value Date    HDL 63.6 12/15/2022    HDL 34.2 (A) 11/08/2021    HDL 61.3 08/29/2019     No results found for: \"LDLCALC\"  Lab Results   Component Value Date    TRIG 76 12/15/2022    TRIG 134 11/08/2021    TRIG 460 (H) 08/29/2019     No components found for: \"CHOLHDL\"    Lab Results   Component Value Date    BNP 95 11/08/2021       Lab Results   Component Value Date    TSH 3.21 12/28/2022       Assessment:   Patient's palpitations have essentially resolved.  Her event recorder was negative for arrhythmias.      Patient will stay on aspirin.       Continue metoprolol succinate 50 mg a day. Attempts to increase metoprolol to 75 mg a day resulted in syncope.     Patient will stay on statin therapy.     Patient will follow up with us in 2 years or sooner with more problems..     Andres Atkins MD      "

## 2024-11-07 LAB
CALPROTECTIN STL-MCNT: 78 UG/G
ELASTASE PANC STL-MCNT: 156 UG/G

## 2024-11-25 ENCOUNTER — HOSPITAL ENCOUNTER (OUTPATIENT)
Dept: RADIOLOGY | Facility: HOSPITAL | Age: 59
Discharge: HOME | End: 2024-11-25
Payer: COMMERCIAL

## 2024-11-25 DIAGNOSIS — M16.12 UNILATERAL PRIMARY OSTEOARTHRITIS, LEFT HIP: ICD-10-CM

## 2024-11-25 PROCEDURE — 77002 NEEDLE LOCALIZATION BY XRAY: CPT | Mod: LT

## 2024-11-25 RX ORDER — LIDOCAINE HYDROCHLORIDE 10 MG/ML
8 INJECTION, SOLUTION EPIDURAL; INFILTRATION; INTRACAUDAL; PERINEURAL ONCE
Status: DISCONTINUED | OUTPATIENT
Start: 2024-11-25 | End: 2024-11-26 | Stop reason: HOSPADM

## 2024-11-25 RX ORDER — TRIAMCINOLONE ACETONIDE 40 MG/ML
40 INJECTION, SUSPENSION INTRA-ARTICULAR; INTRAMUSCULAR ONCE
Status: DISCONTINUED | OUTPATIENT
Start: 2024-11-25 | End: 2024-11-26 | Stop reason: HOSPADM

## 2024-11-25 RX ORDER — LIDOCAINE HYDROCHLORIDE 20 MG/ML
2 INJECTION, SOLUTION EPIDURAL; INFILTRATION; INTRACAUDAL; PERINEURAL ONCE
Status: DISCONTINUED | OUTPATIENT
Start: 2024-11-25 | End: 2024-11-26 | Stop reason: HOSPADM

## 2024-12-29 ENCOUNTER — OFFICE VISIT (OUTPATIENT)
Dept: URGENT CARE | Age: 59
End: 2024-12-29
Payer: COMMERCIAL

## 2024-12-29 VITALS
OXYGEN SATURATION: 99 % | TEMPERATURE: 97.7 F | RESPIRATION RATE: 16 BRPM | SYSTOLIC BLOOD PRESSURE: 148 MMHG | HEART RATE: 89 BPM | DIASTOLIC BLOOD PRESSURE: 88 MMHG

## 2024-12-29 DIAGNOSIS — R10.11 ACUTE ABDOMINAL PAIN IN RIGHT UPPER QUADRANT: Primary | ICD-10-CM

## 2024-12-29 PROCEDURE — 3077F SYST BP >= 140 MM HG: CPT | Performed by: NURSE PRACTITIONER

## 2024-12-29 PROCEDURE — 1036F TOBACCO NON-USER: CPT | Performed by: NURSE PRACTITIONER

## 2024-12-29 PROCEDURE — 99499 UNLISTED E&M SERVICE: CPT | Performed by: NURSE PRACTITIONER

## 2024-12-29 PROCEDURE — 3079F DIAST BP 80-89 MM HG: CPT | Performed by: NURSE PRACTITIONER

## 2024-12-29 NOTE — PROGRESS NOTES
Patient presents to urgent care for complaints of abdominal pain, vomiting, and dizziness for 3 days.  Patient was quickly assessed and brought back to triage.  Reports right upper quadrant pain and states that she has not been able to eat or drink.  Patient was told that she needs to go to the emergency room for further workup of her symptoms to rule out dehydration, cholecystitis or other acute abdominal problems.  When patient was told that she needs to go to the emergency room she reports that she was feeling lightheaded and unable to drive herself.  Patient previously went to [facility] care down the road where she was told that she cannot be seen for her abdominal pain.  EMS was called to transport patient to the nearest emergency room.        Patient disposition: ED

## 2025-01-02 ENCOUNTER — APPOINTMENT (OUTPATIENT)
Dept: PRIMARY CARE | Facility: CLINIC | Age: 60
End: 2025-01-02
Payer: COMMERCIAL

## 2025-01-03 ENCOUNTER — APPOINTMENT (OUTPATIENT)
Dept: PRIMARY CARE | Facility: CLINIC | Age: 60
End: 2025-01-03
Payer: COMMERCIAL

## 2025-01-06 DIAGNOSIS — M16.12 OSTEOARTHRITIS OF LEFT HIP, UNSPECIFIED OSTEOARTHRITIS TYPE: Primary | ICD-10-CM

## 2025-01-06 RX ORDER — TRAMADOL HYDROCHLORIDE 50 MG/1
50 TABLET ORAL EVERY 6 HOURS PRN
Qty: 15 TABLET | Refills: 0 | Status: SHIPPED | OUTPATIENT
Start: 2025-01-06 | End: 2025-01-13

## 2025-01-06 NOTE — PROGRESS NOTES
Subjective   Reason for Visit: Nicolasa Burger is an 59 y.o. female here for a Medicare Wellness visit.               HPI    Patient Care Team:  Harvinder Hussein DO as PCP - General (Internal Medicine)  Andres Atkins MD as Consulting Physician (Cardiology)  CYN Ritter-CNP as Nurse Practitioner (Internal Medicine)     Review of Systems    Objective   Vitals:  There were no vitals taken for this visit.      Physical Exam    Assessment & Plan

## 2025-01-14 ENCOUNTER — TELEPHONE (OUTPATIENT)
Dept: CARDIOLOGY | Facility: CLINIC | Age: 60
End: 2025-01-14
Payer: COMMERCIAL

## 2025-01-14 NOTE — TELEPHONE ENCOUNTER
Rec'd notification that patient will be having a Left THR on 2/11/25.   Requesting cardiac clearance and ok to hold ASA x5 days prior?    Last OV 11/5/24

## 2025-01-31 DIAGNOSIS — M16.12 OSTEOARTHRITIS OF LEFT HIP, UNSPECIFIED OSTEOARTHRITIS TYPE: ICD-10-CM

## 2025-02-03 ENCOUNTER — PRE-ADMISSION TESTING (OUTPATIENT)
Dept: PREADMISSION TESTING | Facility: HOSPITAL | Age: 60
End: 2025-02-03
Payer: COMMERCIAL

## 2025-02-03 VITALS
RESPIRATION RATE: 16 BRPM | HEIGHT: 66 IN | WEIGHT: 142.86 LBS | TEMPERATURE: 96.3 F | OXYGEN SATURATION: 99 % | DIASTOLIC BLOOD PRESSURE: 63 MMHG | HEART RATE: 72 BPM | BODY MASS INDEX: 22.96 KG/M2 | SYSTOLIC BLOOD PRESSURE: 104 MMHG

## 2025-02-03 DIAGNOSIS — Z01.818 PREOP TESTING: ICD-10-CM

## 2025-02-03 DIAGNOSIS — I10 PRIMARY HYPERTENSION: ICD-10-CM

## 2025-02-03 DIAGNOSIS — M16.12 PRIMARY OSTEOARTHRITIS OF LEFT HIP: Primary | ICD-10-CM

## 2025-02-03 DIAGNOSIS — U09.9 POST COVID-19 CONDITION, UNSPECIFIED: ICD-10-CM

## 2025-02-03 LAB
ABO GROUP (TYPE) IN BLOOD: NORMAL
ABO GROUP (TYPE) IN BLOOD: NORMAL
ANTIBODY SCREEN: NORMAL
APTT PPP: 22 SECONDS (ref 27–38)
INR PPP: 1 (ref 0.9–1.1)
PROTHROMBIN TIME: 11.6 SECONDS (ref 9.8–12.8)
RH FACTOR (ANTIGEN D): NORMAL
RH FACTOR (ANTIGEN D): NORMAL

## 2025-02-03 PROCEDURE — 99204 OFFICE O/P NEW MOD 45 MIN: CPT | Performed by: NURSE PRACTITIONER

## 2025-02-03 PROCEDURE — 86900 BLOOD TYPING SEROLOGIC ABO: CPT

## 2025-02-03 PROCEDURE — 36415 COLL VENOUS BLD VENIPUNCTURE: CPT

## 2025-02-03 PROCEDURE — 87081 CULTURE SCREEN ONLY: CPT | Mod: PARLAB

## 2025-02-03 PROCEDURE — 85730 THROMBOPLASTIN TIME PARTIAL: CPT

## 2025-02-03 RX ORDER — TRAMADOL HYDROCHLORIDE 50 MG/1
50 TABLET ORAL EVERY 6 HOURS PRN
Qty: 15 TABLET | Refills: 0 | Status: SHIPPED | OUTPATIENT
Start: 2025-02-03 | End: 2025-02-10

## 2025-02-03 RX ORDER — CHLORHEXIDINE GLUCONATE ORAL RINSE 1.2 MG/ML
15 SOLUTION DENTAL DAILY
Qty: 30 ML | Refills: 0 | Status: SHIPPED | OUTPATIENT
Start: 2025-02-03 | End: 2025-02-05

## 2025-02-03 RX ORDER — CHLORHEXIDINE GLUCONATE 40 MG/ML
SOLUTION TOPICAL DAILY
Qty: 118 ML | Refills: 0 | Status: SHIPPED | OUTPATIENT
Start: 2025-02-03 | End: 2025-02-08

## 2025-02-03 ASSESSMENT — DUKE ACTIVITY SCORE INDEX (DASI)
CAN YOU DO MODERATE WORK AROUND THE HOUSE LIKE VACUUMING, SWEEPING FLOORS OR CARRYING GROCERIES: NO
CAN YOU DO YARD WORK LIKE RAKING LEAVES, WEEDING OR PUSHING A MOWER: NO
TOTAL_SCORE: 10.7
CAN YOU WALK INDOORS, SUCH AS AROUND YOUR HOUSE: NO
CAN YOU DO HEAVY WORK AROUND THE HOUSE LIKE SCRUBBING FLOORS OR LIFTING AND MOVING HEAVY FURNITURE: NO
CAN YOU CLIMB A FLIGHT OF STAIRS OR WALK UP A HILL: NO
CAN YOU TAKE CARE OF YOURSELF (EAT, DRESS, BATHE, OR USE TOILET): YES
CAN YOU DO LIGHT WORK AROUND THE HOUSE LIKE DUSTING OR WASHING DISHES: YES
CAN YOU PARTICIPATE IN MODERATE RECREATIONAL ACTIVITIES LIKE GOLF, BOWLING, DANCING, DOUBLES TENNIS OR THROWING A BASEBALL OR FOOTBALL: NO
CAN YOU HAVE SEXUAL RELATIONS: YES
CAN YOU PARTICIPATE IN STRENOUS SPORTS LIKE SWIMMING, SINGLES TENNIS, FOOTBALL, BASKETBALL, OR SKIING: NO
DASI METS SCORE: 4.1
CAN YOU RUN A SHORT DISTANCE: NO
CAN YOU WALK A BLOCK OR TWO ON LEVEL GROUND: NO

## 2025-02-03 NOTE — CPM/PAT H&P
CPM/PAT Evaluation       Name: Nicolasa Burger (Marisa Koteles)  /Age: 1965/59 y.o.     In-Person       Chief Complaint: PAT for planned Left hip surgery    59 yr old female w/PHx of CVA (2021), asthma, HTN, GERD, chronic pain and Left hip OA referred to PAT for planned Left total hip replacement anterior approach w/c-arm w/Dr Brooke on 2025     Patient reports feeling overall well, denies fever, cough or recent infection. Reports not as active as desired d/t hip pain and need for cane use, can complete ADLs independently; denies cardiac or respiratory symptoms. Past surgical hx includes appendectomy, , breast augmentation, abdominoplasty and Left knee replacement (); denies past issues with anesthesia.      Followed by PCP (Harvinder Hussein DO) - last visit 2024  Followed by cardiology (Andres Atkins MD) - last visit 2024      Of note:   2021 with severe COVID pneumonia. This was complicated by acute stroke which was treated with TPA. CT of the head confirmed a right parietal infarct. She had residual left facial droop, dysarthria and left-sided weakness which is since resolved. She is placed on aspirin, high intensity statin and low-dose Xarelto. She underwent transthoracic echo which demonstrated atrial septal aneurysm with PFO. She underwent percutaneous PFO closure with a 30 mm Cardioform device 2022.          Past Medical History:   Diagnosis Date    Acute upper respiratory infection, unspecified 2021    Viral URI with cough    Arthritis     Asthma     Chronic bronchitis (Multi)     CVA (cerebral vascular accident) (Multi)     Dysphagia     Easy bruising     Effusion, unspecified knee 2021    Suprapatellar effusion of knee    Encounter for screening for malignant neoplasm of colon 2015    Screening for colon cancer    GERD (gastroesophageal reflux disease)     Hypertension     Joint pain     Pain in left knee 2021    Acute pain of left knee     Pain in left knee 09/07/2017    Left knee pain    Pain in left lower leg 07/02/2021    Pain of left calf    Pain in right finger(s)     Pain of right thumb    Pancreatitis ([facility])     Personal history of other diseases of the nervous system and sense organs 06/28/2016    History of labyrinthitis    Personal history of other diseases of the nervous system and sense organs     History of migraine    Personal history of other diseases of the respiratory system 02/10/2021    History of pharyngitis    Personal history of other endocrine, nutritional and metabolic disease     History of goiter    Personal history of other infectious and parasitic diseases     History of varicella    Personal history of other infectious and parasitic diseases     History of mumps    Personal history of other infectious and parasitic diseases 07/13/2021    History of herpes zoster    Personal history of other infectious and parasitic diseases 11/04/2021    History of viral infection    Personal history of other medical treatment     History of echocardiogram    Personal history of other medical treatment     History of cardiac monitoring    Personal history of other specified conditions 07/19/2021    History of right flank pain    Pneumonia     Right upper quadrant pain 07/29/2015    RUQ abdominal pain    Segmental and somatic dysfunction of cervical region 12/12/2015    Segmental and somatic dysfunction of cervical region    Shortness of breath     Strain of other muscle(s) and tendon(s) at lower leg level, left leg, initial encounter 07/02/2021    Strain of calf muscle, left, initial encounter    Strain of unspecified muscle(s) and tendon(s) at lower leg level, left leg, initial encounter 07/02/2021    Strain of left knee, initial encounter    Unilateral primary osteoarthritis, left knee 10/20/2016    Osteoarthritis of left knee    Unspecified acute conjunctivitis, bilateral 07/12/2017    Acute conjunctivitis of both eyes,  unspecified acute conjunctivitis type    Unspecified tear of unspecified meniscus, current injury, left knee, initial encounter 2017    Tear of meniscus of left knee    Urinary tract infection, site not specified 2021    Acute lower UTI    Vision loss        Past Surgical History:   Procedure Laterality Date    APPENDECTOMY  2015    Appendectomy    BELT ABDOMINOPLASTY  2015    Abdominoplasty     SECTION, CLASSIC  2015     Section    CT ANGIO NECK  2021    CT NECK ANGIO W AND WO IV CONTRAST 2021 DOCTOR OFFICE LEGACY    CT HEAD ANGIO W AND WO IV CONTRAST  2021    CT HEAD ANGIO W AND WO IV CONTRAST 2021 DOCTOR OFFICE LEGACY    ENDOMETRIAL ABLATION  2015    Gynecologic Services Thermal Endometrial Ablation    OTHER SURGICAL HISTORY  2015    Breast Surgery Enlargement Procedure    OTHER SURGICAL HISTORY  2021    Knee replacement    OTHER SURGICAL HISTORY  2022    Patent foramen ovale repair       Patient Sexual activity questions deferred to the physician.    Family History   Problem Relation Name Age of Onset    Anxiety disorder Mother      Arthritis Mother      Arthritis Father          seasonal    Alcohol abuse Father          severe    Yun's esophagus Father      Breast cancer Sister         No Known Allergies    Prior to Admission medications    Medication Sig Start Date End Date Taking? Authorizing Provider   albuterol 2.5 mg /3 mL (0.083 %) nebulizer solution Take 3 mL (2.5 mg) by nebulization every 4 hours if needed for wheezing.    Historical Provider, MD   albuterol 90 mcg/actuation inhaler Inhale 2 puffs every 4 hours if needed.    Historical Provider, MD   aspirin 81 mg chewable tablet Chew 1 tablet (81 mg) once daily. 22   Historical Provider, MD   azelastine (Astelin) 137 mcg (0.1 %) nasal spray Administer 2 sprays into each nostril 2 times a day. 24  Harvinder Hussein DO   b complex 0.4 mg tablet  Take 1 tablet by mouth once daily.    Historical Provider, MD   chlorhexidine (Hibiclens) 4 % external liquid Apply topically once daily for 5 days. Wash daily for 5 days prior to surgery with day 5 being morning of surgery. 2/3/25 2/8/25  SOLOMON Majano   chlorhexidine (Peridex) 0.12 % solution Use 15 mL in the mouth or throat once daily for 2 doses. Swish and Spit day before surgery and again morning on day of surgery. 2/3/25 2/5/25  SOLOMON Majano   collagen-biotin-ascorbic acid (Collagen 1500 Plus C) 500 mg-800 mcg- 50 mg capsule Take 1 capsule by mouth once daily.    Historical Provider, MD   cyanocobalamin, vitamin B-12, 1,000 mcg lozenge 1 lozenge po qday 7/23/24   Andres Vega DO   cyclobenzaprine (Flexeril) 10 mg tablet Take 1 tablet (10 mg) by mouth 3 times a day as needed for muscle spasms. 7/23/24   Andres Vega DO   hydrOXYzine HCL (Atarax) 25 mg tablet Take 1 tablet (25 mg) by mouth once daily at bedtime. 7/23/24 7/23/25  Andres Vega DO   magnesium oxide (Mag-Ox) 400 mg tablet Take 1 tablet (400 mg) by mouth once daily.    Historical Provider, MD   metoprolol succinate XL (Toprol-XL) 50 mg 24 hr tablet Take 1 tablet (50 mg) by mouth once daily. Do not crush or chew. 7/23/24   Andres Vega DO   multivitamin with minerals tablet Take 1 tablet by mouth once daily.    Historical Provider, MD   omeprazole (PriLOSEC) 40 mg DR capsule Take 1 capsule (40 mg) by mouth 2 times a day before meals. Do not crush or chew. 7/23/24 7/23/25  Andres Vega DO   primidone (Mysoline) 50 mg tablet Take 1 tablet (50 mg) by mouth 2 times a day as needed. 4/11/22   Historical Provider, MD   sertraline (Zoloft) 100 mg tablet Take 1.5 tablets (150 mg) by mouth once daily. 7/23/24 7/23/25  Andres R Bures, DO   sucralfate (Carafate) 1 gram tablet Take 1 tablet (1 g) by mouth 4 times a day before meals. 8/23/24 8/23/25  Harvinder Hussein, DO   traMADol (Ultram) 50 mg tablet Take 1 tablet (50 mg) by mouth  every 6 hours if needed for severe pain (7 - 10) for up to 7 days. 2/3/25 2/10/25  Harvinder Ice, DO   traMADol (Ultram) 50 mg tablet Take 1 tablet (50 mg) by mouth every 6 hours if needed for severe pain (7 - 10) for up to 7 days. 1/6/25 1/31/25  Harvinder Ice, DO        Review of Systems    Constitutional: no fever, no chills and not feeling poorly.   Eyes: no eyesight problems.   ENT: no hearing loss, no nosebleeds and no sore throat.   Cardiovascular: no chest pain, no palpitations and no extremity edema.   Respiratory: no sob, no wheezing, no cough and no sob w/exertion.   Gastrointestinal: negative for abdominal pain, blood in stools or changes in bowel habits   Genitourinary: negative for dysuria, incontinence or changes in urinary habits   Musculoskeletal: +arthralgias (Left hip), ambulates independently w/cane; denies falls.   Integumentary: negative for lesions, rash or itching.   Neurological: negative for confusion, dizziness or fainting.   Psychiatric: not suicidal, no anxiety and no depression.   All other systems have been reviewed and are negative for complaint.     Physical Exam  Vitals reviewed.   Constitutional:       Appearance: Normal appearance.   HENT:      Head: Normocephalic.      Mouth/Throat:      Mouth: Mucous membranes are moist.   Eyes:      Pupils: Pupils are equal, round, and reactive to light.      Comments: Corrective lenses in use   Cardiovascular:      Rate and Rhythm: Normal rate and regular rhythm.   Pulmonary:      Effort: Pulmonary effort is normal.      Breath sounds: Normal breath sounds.   Abdominal:      General: Bowel sounds are normal.   Musculoskeletal:         General: Normal range of motion.      Comments: Cane in use   Skin:     General: Skin is warm and dry.   Neurological:      Mental Status: She is alert and oriented to person, place, and time.   Psychiatric:         Mood and Affect: Mood normal.          PAT AIRWAY:   Airway:     Mallampati::  I    TM distance::  >3 FB    " Neck ROM::  Full  normal        Testing/Diagnostic: CBC, BMP, A1c, coag, T&S, staph/MRSA and ecg    Patient Specialist/PCP: Harvinder Hussein DO (PCP) 11/4/2024; Andres Atkins MD (cardiology) 11/5/2024    Visit Vitals  /63   Pulse 72   Temp 35.7 °C (96.3 °F) (Temporal)   Resp 16   Ht 1.676 m (5' 6\")   Wt 64.8 kg (142 lb 13.7 oz)   SpO2 99%   BMI 23.06 kg/m²   OB Status Postmenopausal   Smoking Status Never   BSA 1.74 m²       DASI Risk Score      Flowsheet Row Pre-Admission Testing from 2/3/2025 in [facility]   Can you take care of yourself (eat, dress, bathe, or use toilet)?  2.75 filed at 02/03/2025 1335   Can you walk indoors, such as around your house? 0 filed at 02/03/2025 1335   Can you walk a block or two on level ground?  0 filed at 02/03/2025 1335   Can you climb a flight of stairs or walk up a hill? 0 filed at 02/03/2025 1335   Can you run a short distance? 0 filed at 02/03/2025 1335   Can you do light work around the house like dusting or washing dishes? 2.7 filed at 02/03/2025 1335   Can you do moderate work around the house like vacuuming, sweeping floors or carrying groceries? 0 filed at 02/03/2025 1335   Can you do heavy work around the house like scrubbing floors or lifting and moving heavy furniture?  0 filed at 02/03/2025 1335   Can you do yard work like raking leaves, weeding or pushing a mower? 0 filed at 02/03/2025 1335   Can you have sexual relations? 5.25 filed at 02/03/2025 1335   Can you participate in moderate recreational activities like golf, bowling, dancing, doubles tennis or throwing a baseball or football? 0 filed at 02/03/2025 1335   Can you participate in strenous sports like swimming, singles tennis, football, basketball, or skiing? 0 filed at 02/03/2025 1335   DASI SCORE 10.7 filed at 02/03/2025 1335   METS Score (Will be calculated only when all the questions are answered) 4.1 filed at 02/03/2025 1335          Caprini DVT Assessment    No data to display   "     Modified Frailty Index    No data to display       CHADS2 Stroke Risk  Current as of 40 minutes ago        N/A 3 to 100%: High Risk   2 to < 3%: Medium Risk   0 to < 2%: Low Risk     Last Change: N/A          This score determines the patient's risk of having a stroke if the patient has atrial fibrillation.        This score is not applicable to this patient. Components are not calculated.          Revised Cardiac Risk Index    No data to display       Apfel Simplified Score    No data to display       Risk Analysis Index Results This Encounter    No data found in the last 10 encounters.       Stop Bang Score      Flowsheet Row Pre-Admission Testing from 2/3/2025 in [facility]   Do you snore loudly? 0 filed at 02/03/2025 1335   Do you often feel tired or fatigued after your sleep? 0 filed at 02/03/2025 1335   Has anyone ever observed you stop breathing in your sleep? 1 filed at 02/03/2025 1335   Do you have or are you being treated for high blood pressure? 1 filed at 02/03/2025 1335   Recent BMI (Calculated) 23.1 filed at 02/03/2025 1335   Is BMI greater than 35 kg/m2? 0=No filed at 02/03/2025 1335   Age older than 50 years old? 1=Yes filed at 02/03/2025 1335   Gender - Male 0=No filed at 02/03/2025 1335          Prodigy: High Risk  Total Score: 3              Prodigy Previous Opioid Use Score           ARISCAT Score for Postoperative Pulmonary Complications      Flowsheet Row Pre-Admission Testing from 2/3/2025 in [facility]   Age Calculated Score 3 filed at 02/03/2025 1539   Preoperative SpO2 0 filed at 02/03/2025 1539   Respiratory infection in the last month Either upper or lower (i.e., URI, bronchitis, pneumonia), with fever and antibiotic treatment 0 filed at 02/03/2025 1539   Preoperative anemia (Hgb less than 10 g/dl) 0 filed at 02/03/2025 1539   Surgical incision  0 filed at 02/03/2025 1539   Duration of surgery  0 filed at 02/03/2025 1539   Emergency Procedure  0 filed at  02/03/2025 1539   ARISCAT Total Score  3 filed at 02/03/2025 1539          Fredi Perioperative Risk for Myocardial Infarction or Cardiac Arrest (GAVIOTA)      Flowsheet Row Pre-Admission Testing from 2/3/2025 in [facility]   Calculated Age Score 1.18 filed at 02/03/2025 1540   Functional Status  0.65 filed at 02/03/2025 1540   ASA Class  -3.29 filed at 02/03/2025 1540   Creatinine 0 filed at 02/03/2025 1540   Type of Procedure  0.80 filed at 02/03/2025 1540   GAVIOTA Total Score  -5.91 filed at 02/03/2025 1540   GAVIOTA % 0.27 filed at 02/03/2025 1540            Assessment and Plan:     # Anxiety/depression - continue sertraline  # hx CVA - hospitalized/treated TPA (11/2021) w/Left sided weakness; has since recovered w/no residual  # Asthma - seasonal; continue inhaler; states has not needed to use inhaler since summer   # HTN - continue metoprolol  # GERD - continue omeprazole  # Chronic pain - continue tramadol  # Left hip OA - Left total hip replacement anterior approach w/c-arm w/Dr Brooke on 2/11/2025    Ecg complete 11/5/2024 - NSR, Possible Left atrial enlargement (67 bpm)  Medical hx, Allergies, VS and Labs reviewed  Medications addressed w/pre-op instructions provided    Cardiac clearance provided ([place], 1/14/2025)    Echocardiogram 6/9/2023  CONCLUSIONS:  1. Left ventricular systolic function is normal with a 60-65% estimated ejection fraction.  2. Spectral Doppler shows an impaired relaxation pattern of left ventricular diastolic filling.  3. PFO closure device noted; device is well-seated.  4. There is no evidence of a patent foramen ovale.  5. RVSP within normal limits.

## 2025-02-03 NOTE — PREPROCEDURE INSTRUCTIONS
Medication List            Accurate as of February 3, 2025  2:11 PM. Always use your most recent med list.                albuterol 90 mcg/actuation inhaler  Medication Adjustments for Surgery: Take/Use as prescribed     aspirin 81 mg chewable tablet  Additional Medication Adjustments for Surgery: Take last dose 5 days before surgery     azelastine 137 mcg (0.1 %) nasal spray  Commonly known as: Astelin  Administer 2 sprays into each nostril 2 times a day.  Medication Adjustments for Surgery: Take/Use as prescribed     b complex 0.4 mg tablet  Additional Medication Adjustments for Surgery: Take last dose 7 days before surgery     * chlorhexidine 0.12 % solution  Commonly known as: Peridex  Use 15 mL in the mouth or throat once daily for 2 doses. Swish and Spit day before surgery and again morning on day of surgery.  Medication Adjustments for Surgery: Take/Use as prescribed     * chlorhexidine 4 % external liquid  Commonly known as: Hibiclens  Apply topically once daily for 5 days. Wash daily for 5 days prior to surgery with day 5 being morning of surgery.  Medication Adjustments for Surgery: Take/Use as prescribed     Collagen 1500 Plus C 500 mg-800 mcg- 50 mg capsule  Generic drug: collagen-biotin-ascorbic acid  Additional Medication Adjustments for Surgery: Take last dose 7 days before surgery     cyanocobalamin (vitamin B-12) 1,000 mcg lozenge  1 lozenge po qday  Additional Medication Adjustments for Surgery: Take last dose 7 days before surgery     hydrOXYzine HCL 25 mg tablet  Commonly known as: Atarax  Take 1 tablet (25 mg) by mouth once daily at bedtime.  Medication Adjustments for Surgery: Do Not take on the morning of surgery     magnesium oxide 400 mg tablet  Commonly known as: Mag-Ox  Medication Adjustments for Surgery: Do Not take on the morning of surgery     metoprolol succinate XL 50 mg 24 hr tablet  Commonly known as: Toprol-XL  Take 1 tablet (50 mg) by mouth once daily. Do not crush or  chew.  Medication Adjustments for Surgery: Take/Use as prescribed     omeprazole 40 mg DR capsule  Commonly known as: PriLOSEC  Take 1 capsule (40 mg) by mouth 2 times a day before meals. Do not crush or chew.  Medication Adjustments for Surgery: Take/Use as prescribed     sertraline 100 mg tablet  Commonly known as: Zoloft  Take 1.5 tablets (150 mg) by mouth once daily.  Medication Adjustments for Surgery: Take/Use as prescribed     traMADol 50 mg tablet  Commonly known as: Ultram  Take 1 tablet (50 mg) by mouth every 6 hours if needed for severe pain (7 - 10) for up to 7 days.  Medication Adjustments for Surgery: Take/Use as prescribed           * This list has 2 medication(s) that are the same as other medications prescribed for you. Read the directions carefully, and ask your doctor or other care provider to review them with you.              PRE-OPERATIVE INSTRUCTIONS FOR SURGERY    PLEASE REVIEW YOUR MEDICATION LIST so as to define when the take your medications prior to surgery.      *Do not eat anything after midnight the night of surgery.  This includes food of any kind (including hard candy, cough drops, mints).   You may have up to 13 ounces of clear liquid  until TWO hours prior to your scheduled surgery time, u Clear liquids include water, black tea/coffee, (no milk or cream) apple juice and electrolyte drinks (GATORADE).  You may chew gum until TWO hours prior you your surgery/procedure.         *One of our staff members will call you ONE business day before your surgery, between 11 am-2 pm to let you know the time to arrive.    If you have not received a call by 2 pm, call [phone]    *When you arrive at the hospital-->GO TO Registration on the ground floor    *Stop smoking 24 hours prior to surgery.      No Marijuana, CBD Oil or Vaping for 48   Hours    *No alcohol 24 hours prior to surgery  *You will need a responsible adult to drive you home    -No acrylic nails or nail polish on at least one  fingernail, NO polish on toes for foot surgery    -You may be asked to remove your dentures, partial plate, eyeglasses or contact lenses before going to surgery.  Please bring a case for these items.    -Body piercings need to be removed.  Jewelry and valuables should be left at home.    -Put on loose,  comfortable, clean clothing, that will accommodate bandages          What is a home antibacterial shower?    This shower is a way of cleaning the skin with a germ killing solution before surgery.  The solution contains chlorhexidine, commonly known as CHG.  CHG is a skin cleanser with germ killing ability.  Let your doctor know if you are allergic to chlorhexidine.    Why do I need to take a preoperative antibacterial shower?    Skin is not sterile.  It is best to try to make your skin as free of germs as possible before surgery.  Proper cleansing with a germ killing soap before surgery can lower the number of germs on your skin.  This helps to reduce the risk of infection at the surgical site.  Following the instructions listed below will help you prepare your skin for surgery.      How do I use the solution?    Steps: Begin using your CHG soap 5 days before your surgery on __________________.    *First, wash and rinse your hair using the CHG soap.  Keep CHG soap away from ear canals and eyes.   Rinse completely, do not condition.  Hair extensions should be removed.      *Wash your face with your normal soap and rinse.   *Apply the CHG solution to a clean wet washcloth.  Turn the water off or move away from the water spray to avoid premature rinsing of the CHG soap as you are applying.  Firmly lather your entire body from the neck down.  Do not use on your face.      *Pay special attention to the area(s) where your incision(s) will be located unless they are on your face.  Avoid scrubbing your skin too hard.  The important part is to have the CHG soap sit on your skin for 3 minutes.   *When the 3 minutes are up,  turn on the water and rinse the CHG solution off your body completely.    *Do not wash with regular soap after you  have  used the CHG soap solution.  *Pat  yourself dry with a clean, freshly laundered towel.    *Do not apply powders, deodorants or lotions.  *Dress in clean freshly laundered night clothes.    *Be sure to sleep with clean freshly laundered sheets.      *Be aware the CHG will cause stains on fabrics; if you wash them with bleach after use.  Rinse your washcloth and other linens that have contact with CHG completely.  Use only non-chlorine detergents to launder the items  used.  *The morning of surgery is the fifth day.  Repeat the above steps and dress in clean comfortable clothing.     What is oral/dental rinse?    It is mouthwash.  It is a way of cleaning the he mouth with a germ-killing solution before your surgery.  The solution contains chlorhexidine, commonly known as CHG.  It is used inside the mouth to kill a bacteria known as Staphylococcus aureus.  Let your doctor know if you are allergic to Chlorhexidine.    Why do I need to use CHG oral/ dental rinse?    The CHG oral/dental rinse helps to kill bacteria in your mouth know as Staphylococcus aureus.  This reduces the risk of infection at the surgical site.    Using your CHG oral/dental rinse    STEPS:    Use your CHG oral/dental rinse after you brush your teeth the night before (at bedtime) and the morning of your surgery.  Follow all the directions on your prescription label.  *Use the cap on the container to measure 15 ml  *Swish (gargle if you can) the mouthwash in your mouth for at least 30 seconds, (do not swallow) and spit out  *After you use your CHG rinse, do not rinse your mouth with water, drink or eat.  Please refer to the prescription label for the appropriate time to resume oral intake.    What side effects might I have using the CHG oral/dental rinse?    CHG rinse will stick you plaque on the teeth.  Brush and floss just before  use.   Teeth brushing will help avoid staining of the plaque during  use.  Teeth brushing will help avoid staining of plaque during  use.      Who should I contact if I have questions   about the CHG oral/dental rinse and or CHG soap?    Please call [facility], Pre-Admission testing at [phone] if you have any questions.      What you may be asked to bring to surgery:    ___, walker  - put in car trunk for home transfer    --- photo ID and insurance information          NPO Instructions:    Do not eat any food after midnight the night before your surgery/procedure.  You may have clear liquids until TWO hours before surgery/procedure. This includes water, black tea/coffee, (no milk or cream) apple juice and electrolyte drinks (Gatorade).  You may chew gum up to TWO hours before your surgery/procedure.    Additional Instructions:     Day of Surgery:  Review your medication instructions, take indicated medications  You may have clear liquids until TWO hours before surgery/procedure.  This includes water, black tea/coffee, (no milk or cream) apple juice and electrolyte drinks (Gatorade)  You may chew gum up to TWO hours before your surgery/procedure  Wear  comfortable loose fitting clothing  Do not use moisturizers, creams, lotions or perfume  All jewelry and valuables should be left at home

## 2025-02-03 NOTE — H&P (VIEW-ONLY)
CPM/PAT Evaluation       Name: Nicolasa Burger (Marisa Koteles)  /Age: 1965/59 y.o.     In-Person       Chief Complaint: PAT for planned Left hip surgery    59 yr old female w/PHx of CVA (2021), asthma, HTN, GERD, chronic pain and Left hip OA referred to PAT for planned Left total hip replacement anterior approach w/c-arm w/Dr Brooke on 2025     Patient reports feeling overall well, denies fever, cough or recent infection. Reports not as active as desired d/t hip pain and need for cane use, can complete ADLs independently; denies cardiac or respiratory symptoms. Past surgical hx includes appendectomy, , breast augmentation, abdominoplasty and Left knee replacement (); denies past issues with anesthesia.      Followed by PCP (Harvinder Hussein DO) - last visit 2024  Followed by cardiology (Andres Atkins MD) - last visit 2024      Of note:   2021 with severe COVID pneumonia. This was complicated by acute stroke which was treated with TPA. CT of the head confirmed a right parietal infarct. She had residual left facial droop, dysarthria and left-sided weakness which is since resolved. She is placed on aspirin, high intensity statin and low-dose Xarelto. She underwent transthoracic echo which demonstrated atrial septal aneurysm with PFO. She underwent percutaneous PFO closure with a 30 mm Cardioform device 2022.          Past Medical History:   Diagnosis Date    Acute upper respiratory infection, unspecified 2021    Viral URI with cough    Arthritis     Asthma     Chronic bronchitis (Multi)     CVA (cerebral vascular accident) (Multi)     Dysphagia     Easy bruising     Effusion, unspecified knee 2021    Suprapatellar effusion of knee    Encounter for screening for malignant neoplasm of colon 2015    Screening for colon cancer    GERD (gastroesophageal reflux disease)     Hypertension     Joint pain     Pain in left knee 2021    Acute pain of left knee     Pain in left knee 09/07/2017    Left knee pain    Pain in left lower leg 07/02/2021    Pain of left calf    Pain in right finger(s)     Pain of right thumb    Pancreatitis ([facility])     Personal history of other diseases of the nervous system and sense organs 06/28/2016    History of labyrinthitis    Personal history of other diseases of the nervous system and sense organs     History of migraine    Personal history of other diseases of the respiratory system 02/10/2021    History of pharyngitis    Personal history of other endocrine, nutritional and metabolic disease     History of goiter    Personal history of other infectious and parasitic diseases     History of varicella    Personal history of other infectious and parasitic diseases     History of mumps    Personal history of other infectious and parasitic diseases 07/13/2021    History of herpes zoster    Personal history of other infectious and parasitic diseases 11/04/2021    History of viral infection    Personal history of other medical treatment     History of echocardiogram    Personal history of other medical treatment     History of cardiac monitoring    Personal history of other specified conditions 07/19/2021    History of right flank pain    Pneumonia     Right upper quadrant pain 07/29/2015    RUQ abdominal pain    Segmental and somatic dysfunction of cervical region 12/12/2015    Segmental and somatic dysfunction of cervical region    Shortness of breath     Strain of other muscle(s) and tendon(s) at lower leg level, left leg, initial encounter 07/02/2021    Strain of calf muscle, left, initial encounter    Strain of unspecified muscle(s) and tendon(s) at lower leg level, left leg, initial encounter 07/02/2021    Strain of left knee, initial encounter    Unilateral primary osteoarthritis, left knee 10/20/2016    Osteoarthritis of left knee    Unspecified acute conjunctivitis, bilateral 07/12/2017    Acute conjunctivitis of both eyes,  unspecified acute conjunctivitis type    Unspecified tear of unspecified meniscus, current injury, left knee, initial encounter 2017    Tear of meniscus of left knee    Urinary tract infection, site not specified 2021    Acute lower UTI    Vision loss        Past Surgical History:   Procedure Laterality Date    APPENDECTOMY  2015    Appendectomy    BELT ABDOMINOPLASTY  2015    Abdominoplasty     SECTION, CLASSIC  2015     Section    CT ANGIO NECK  2021    CT NECK ANGIO W AND WO IV CONTRAST 2021 DOCTOR OFFICE LEGACY    CT HEAD ANGIO W AND WO IV CONTRAST  2021    CT HEAD ANGIO W AND WO IV CONTRAST 2021 DOCTOR OFFICE LEGACY    ENDOMETRIAL ABLATION  2015    Gynecologic Services Thermal Endometrial Ablation    OTHER SURGICAL HISTORY  2015    Breast Surgery Enlargement Procedure    OTHER SURGICAL HISTORY  2021    Knee replacement    OTHER SURGICAL HISTORY  2022    Patent foramen ovale repair       Patient Sexual activity questions deferred to the physician.    Family History   Problem Relation Name Age of Onset    Anxiety disorder Mother      Arthritis Mother      Arthritis Father          seasonal    Alcohol abuse Father          severe    Yun's esophagus Father      Breast cancer Sister         No Known Allergies    Prior to Admission medications    Medication Sig Start Date End Date Taking? Authorizing Provider   albuterol 2.5 mg /3 mL (0.083 %) nebulizer solution Take 3 mL (2.5 mg) by nebulization every 4 hours if needed for wheezing.    Historical Provider, MD   albuterol 90 mcg/actuation inhaler Inhale 2 puffs every 4 hours if needed.    Historical Provider, MD   aspirin 81 mg chewable tablet Chew 1 tablet (81 mg) once daily. 22   Historical Provider, MD   azelastine (Astelin) 137 mcg (0.1 %) nasal spray Administer 2 sprays into each nostril 2 times a day. 24  Harvinder Hussein DO   b complex 0.4 mg tablet  Take 1 tablet by mouth once daily.    Historical Provider, MD   chlorhexidine (Hibiclens) 4 % external liquid Apply topically once daily for 5 days. Wash daily for 5 days prior to surgery with day 5 being morning of surgery. 2/3/25 2/8/25  SOLOMON Majano   chlorhexidine (Peridex) 0.12 % solution Use 15 mL in the mouth or throat once daily for 2 doses. Swish and Spit day before surgery and again morning on day of surgery. 2/3/25 2/5/25  SOLOMON Majano   collagen-biotin-ascorbic acid (Collagen 1500 Plus C) 500 mg-800 mcg- 50 mg capsule Take 1 capsule by mouth once daily.    Historical Provider, MD   cyanocobalamin, vitamin B-12, 1,000 mcg lozenge 1 lozenge po qday 7/23/24   Andres Vega DO   cyclobenzaprine (Flexeril) 10 mg tablet Take 1 tablet (10 mg) by mouth 3 times a day as needed for muscle spasms. 7/23/24   Andres Vega DO   hydrOXYzine HCL (Atarax) 25 mg tablet Take 1 tablet (25 mg) by mouth once daily at bedtime. 7/23/24 7/23/25  Andres Vega DO   magnesium oxide (Mag-Ox) 400 mg tablet Take 1 tablet (400 mg) by mouth once daily.    Historical Provider, MD   metoprolol succinate XL (Toprol-XL) 50 mg 24 hr tablet Take 1 tablet (50 mg) by mouth once daily. Do not crush or chew. 7/23/24   Andres Vega DO   multivitamin with minerals tablet Take 1 tablet by mouth once daily.    Historical Provider, MD   omeprazole (PriLOSEC) 40 mg DR capsule Take 1 capsule (40 mg) by mouth 2 times a day before meals. Do not crush or chew. 7/23/24 7/23/25  Andres Vega DO   primidone (Mysoline) 50 mg tablet Take 1 tablet (50 mg) by mouth 2 times a day as needed. 4/11/22   Historical Provider, MD   sertraline (Zoloft) 100 mg tablet Take 1.5 tablets (150 mg) by mouth once daily. 7/23/24 7/23/25  Andres R Bures, DO   sucralfate (Carafate) 1 gram tablet Take 1 tablet (1 g) by mouth 4 times a day before meals. 8/23/24 8/23/25  Harvinder Hussein, DO   traMADol (Ultram) 50 mg tablet Take 1 tablet (50 mg) by mouth  every 6 hours if needed for severe pain (7 - 10) for up to 7 days. 2/3/25 2/10/25  Harvinder Ice, DO   traMADol (Ultram) 50 mg tablet Take 1 tablet (50 mg) by mouth every 6 hours if needed for severe pain (7 - 10) for up to 7 days. 1/6/25 1/31/25  Harvinder Ice, DO        Review of Systems    Constitutional: no fever, no chills and not feeling poorly.   Eyes: no eyesight problems.   ENT: no hearing loss, no nosebleeds and no sore throat.   Cardiovascular: no chest pain, no palpitations and no extremity edema.   Respiratory: no sob, no wheezing, no cough and no sob w/exertion.   Gastrointestinal: negative for abdominal pain, blood in stools or changes in bowel habits   Genitourinary: negative for dysuria, incontinence or changes in urinary habits   Musculoskeletal: +arthralgias (Left hip), ambulates independently w/cane; denies falls.   Integumentary: negative for lesions, rash or itching.   Neurological: negative for confusion, dizziness or fainting.   Psychiatric: not suicidal, no anxiety and no depression.   All other systems have been reviewed and are negative for complaint.     Physical Exam  Vitals reviewed.   Constitutional:       Appearance: Normal appearance.   HENT:      Head: Normocephalic.      Mouth/Throat:      Mouth: Mucous membranes are moist.   Eyes:      Pupils: Pupils are equal, round, and reactive to light.      Comments: Corrective lenses in use   Cardiovascular:      Rate and Rhythm: Normal rate and regular rhythm.   Pulmonary:      Effort: Pulmonary effort is normal.      Breath sounds: Normal breath sounds.   Abdominal:      General: Bowel sounds are normal.   Musculoskeletal:         General: Normal range of motion.      Comments: Cane in use   Skin:     General: Skin is warm and dry.   Neurological:      Mental Status: She is alert and oriented to person, place, and time.   Psychiatric:         Mood and Affect: Mood normal.          PAT AIRWAY:   Airway:     Mallampati::  I    TM distance::  >3 FB    " Neck ROM::  Full  normal        Testing/Diagnostic: CBC, BMP, A1c, coag, T&S, staph/MRSA and ecg    Patient Specialist/PCP: Harvinder Hussein DO (PCP) 11/4/2024; Andres Atkins MD (cardiology) 11/5/2024    Visit Vitals  /63   Pulse 72   Temp 35.7 °C (96.3 °F) (Temporal)   Resp 16   Ht 1.676 m (5' 6\")   Wt 64.8 kg (142 lb 13.7 oz)   SpO2 99%   BMI 23.06 kg/m²   OB Status Postmenopausal   Smoking Status Never   BSA 1.74 m²       DASI Risk Score      Flowsheet Row Pre-Admission Testing from 2/3/2025 in [facility]   Can you take care of yourself (eat, dress, bathe, or use toilet)?  2.75 filed at 02/03/2025 1335   Can you walk indoors, such as around your house? 0 filed at 02/03/2025 1335   Can you walk a block or two on level ground?  0 filed at 02/03/2025 1335   Can you climb a flight of stairs or walk up a hill? 0 filed at 02/03/2025 1335   Can you run a short distance? 0 filed at 02/03/2025 1335   Can you do light work around the house like dusting or washing dishes? 2.7 filed at 02/03/2025 1335   Can you do moderate work around the house like vacuuming, sweeping floors or carrying groceries? 0 filed at 02/03/2025 1335   Can you do heavy work around the house like scrubbing floors or lifting and moving heavy furniture?  0 filed at 02/03/2025 1335   Can you do yard work like raking leaves, weeding or pushing a mower? 0 filed at 02/03/2025 1335   Can you have sexual relations? 5.25 filed at 02/03/2025 1335   Can you participate in moderate recreational activities like golf, bowling, dancing, doubles tennis or throwing a baseball or football? 0 filed at 02/03/2025 1335   Can you participate in strenous sports like swimming, singles tennis, football, basketball, or skiing? 0 filed at 02/03/2025 1335   DASI SCORE 10.7 filed at 02/03/2025 1335   METS Score (Will be calculated only when all the questions are answered) 4.1 filed at 02/03/2025 1335          Caprini DVT Assessment    No data to display   "     Modified Frailty Index    No data to display       CHADS2 Stroke Risk  Current as of 40 minutes ago        N/A 3 to 100%: High Risk   2 to < 3%: Medium Risk   0 to < 2%: Low Risk     Last Change: N/A          This score determines the patient's risk of having a stroke if the patient has atrial fibrillation.        This score is not applicable to this patient. Components are not calculated.          Revised Cardiac Risk Index    No data to display       Apfel Simplified Score    No data to display       Risk Analysis Index Results This Encounter    No data found in the last 10 encounters.       Stop Bang Score      Flowsheet Row Pre-Admission Testing from 2/3/2025 in [facility]   Do you snore loudly? 0 filed at 02/03/2025 1335   Do you often feel tired or fatigued after your sleep? 0 filed at 02/03/2025 1335   Has anyone ever observed you stop breathing in your sleep? 1 filed at 02/03/2025 1335   Do you have or are you being treated for high blood pressure? 1 filed at 02/03/2025 1335   Recent BMI (Calculated) 23.1 filed at 02/03/2025 1335   Is BMI greater than 35 kg/m2? 0=No filed at 02/03/2025 1335   Age older than 50 years old? 1=Yes filed at 02/03/2025 1335   Gender - Male 0=No filed at 02/03/2025 1335          Prodigy: High Risk  Total Score: 3              Prodigy Previous Opioid Use Score           ARISCAT Score for Postoperative Pulmonary Complications      Flowsheet Row Pre-Admission Testing from 2/3/2025 in [facility]   Age Calculated Score 3 filed at 02/03/2025 1539   Preoperative SpO2 0 filed at 02/03/2025 1539   Respiratory infection in the last month Either upper or lower (i.e., URI, bronchitis, pneumonia), with fever and antibiotic treatment 0 filed at 02/03/2025 1539   Preoperative anemia (Hgb less than 10 g/dl) 0 filed at 02/03/2025 1539   Surgical incision  0 filed at 02/03/2025 1539   Duration of surgery  0 filed at 02/03/2025 1539   Emergency Procedure  0 filed at  02/03/2025 1539   ARISCAT Total Score  3 filed at 02/03/2025 1539          Fredi Perioperative Risk for Myocardial Infarction or Cardiac Arrest (GAVIOTA)      Flowsheet Row Pre-Admission Testing from [facility]/3/[facility] in [facility]   Calculated Age Score 1.18 filed at 02/03/2025 1540   Functional Status  0.65 filed at 02/03/2025 1540   ASA Class  -3.29 filed at 02/03/2025 1540   Creatinine 0 filed at 02/03/2025 1540   Type of Procedure  0.80 filed at 02/03/2025 1540   GAVIOTA Total Score  -5.91 filed at 02/03/2025 1540   GAVIOTA % 0.27 filed at 02/03/2025 1540            Assessment and Plan:     # Anxiety/depression - continue sertraline  # hx CVA - hospitalized/treated TPA (11/2021) w/Left sided weakness; has since recovered w/no residual  # Asthma - seasonal; continue inhaler; states has not needed to use inhaler since summer   # HTN - continue metoprolol  # GERD - continue omeprazole  # Chronic pain - continue tramadol  # Left hip OA - Left total hip replacement anterior approach w/c-arm w/Dr Brooke on 2/11/2025    Ecg complete 11/5/2024 - NSR, Possible Left atrial enlargement (67 bpm)  Medical hx, Allergies, VS and Labs reviewed  Medications addressed w/pre-op instructions provided    Cardiac clearance provided ([place], 1/14/2025)    Echocardiogram 6/9/2023  CONCLUSIONS:  1. Left ventricular systolic function is normal with a 60-65% estimated ejection fraction.  2. Spectral Doppler shows an impaired relaxation pattern of left ventricular diastolic filling.  3. PFO closure device noted; device is well-seated.  4. There is no evidence of a patent foramen ovale.  5. RVSP within normal limits.

## 2025-02-04 RX ORDER — CHLORHEXIDINE GLUCONATE 40 MG/ML
SOLUTION TOPICAL DAILY
Qty: 118 ML | Refills: 0 | Status: SHIPPED | OUTPATIENT
Start: 2025-02-04 | End: 2025-02-12 | Stop reason: HOSPADM

## 2025-02-05 LAB — STAPHYLOCOCCUS SPEC CULT: NORMAL

## 2025-02-11 ENCOUNTER — APPOINTMENT (OUTPATIENT)
Dept: RADIOLOGY | Facility: HOSPITAL | Age: 60
End: 2025-02-11
Payer: COMMERCIAL

## 2025-02-11 ENCOUNTER — ANESTHESIA EVENT (OUTPATIENT)
Dept: OPERATING ROOM | Facility: HOSPITAL | Age: 60
End: 2025-02-11
Payer: COMMERCIAL

## 2025-02-11 ENCOUNTER — ANESTHESIA (OUTPATIENT)
Dept: OPERATING ROOM | Facility: HOSPITAL | Age: 60
End: 2025-02-11
Payer: COMMERCIAL

## 2025-02-11 ENCOUNTER — HOSPITAL ENCOUNTER (OUTPATIENT)
Facility: HOSPITAL | Age: 60
Discharge: HOME HEALTH | End: 2025-02-12
Attending: ORTHOPAEDIC SURGERY | Admitting: ORTHOPAEDIC SURGERY
Payer: COMMERCIAL

## 2025-02-11 DIAGNOSIS — Z01.818 PREOP TESTING: Primary | ICD-10-CM

## 2025-02-11 DIAGNOSIS — M16.12 PRIMARY OSTEOARTHRITIS OF LEFT HIP: ICD-10-CM

## 2025-02-11 DIAGNOSIS — M16.12 OSTEOARTHRITIS OF LEFT HIP, UNSPECIFIED OSTEOARTHRITIS TYPE: ICD-10-CM

## 2025-02-11 DIAGNOSIS — M16.12 ARTHRITIS OF LEFT HIP: ICD-10-CM

## 2025-02-11 PROBLEM — Z98.890 PONV (POSTOPERATIVE NAUSEA AND VOMITING): Status: ACTIVE | Noted: 2025-02-11

## 2025-02-11 PROBLEM — R11.2 PONV (POSTOPERATIVE NAUSEA AND VOMITING): Status: ACTIVE | Noted: 2025-02-11

## 2025-02-11 LAB
ABO GROUP (TYPE) IN BLOOD: NORMAL
ANTIBODY SCREEN: NORMAL
RH FACTOR (ANTIGEN D): NORMAL

## 2025-02-11 PROCEDURE — 76000 FLUOROSCOPY <1 HR PHYS/QHP: CPT | Mod: 59

## 2025-02-11 PROCEDURE — 2500000005 HC RX 250 GENERAL PHARMACY W/O HCPCS: Performed by: ORTHOPAEDIC SURGERY

## 2025-02-11 PROCEDURE — 2500000004 HC RX 250 GENERAL PHARMACY W/ HCPCS (ALT 636 FOR OP/ED)

## 2025-02-11 PROCEDURE — 3600000017 HC OR TIME - EACH INCREMENTAL 1 MINUTE - PROCEDURE LEVEL SIX: Performed by: ORTHOPAEDIC SURGERY

## 2025-02-11 PROCEDURE — 2500000001 HC RX 250 WO HCPCS SELF ADMINISTERED DRUGS (ALT 637 FOR MEDICARE OP): Performed by: CLINICAL NURSE SPECIALIST

## 2025-02-11 PROCEDURE — 2500000004 HC RX 250 GENERAL PHARMACY W/ HCPCS (ALT 636 FOR OP/ED): Performed by: ORTHOPAEDIC SURGERY

## 2025-02-11 PROCEDURE — 97165 OT EVAL LOW COMPLEX 30 MIN: CPT | Mod: GO

## 2025-02-11 PROCEDURE — 97161 PT EVAL LOW COMPLEX 20 MIN: CPT | Mod: GP

## 2025-02-11 PROCEDURE — 7100000001 HC RECOVERY ROOM TIME - INITIAL BASE CHARGE: Performed by: ORTHOPAEDIC SURGERY

## 2025-02-11 PROCEDURE — 36415 COLL VENOUS BLD VENIPUNCTURE: CPT | Performed by: ANESTHESIOLOGY

## 2025-02-11 PROCEDURE — 2500000004 HC RX 250 GENERAL PHARMACY W/ HCPCS (ALT 636 FOR OP/ED): Performed by: CLINICAL NURSE SPECIALIST

## 2025-02-11 PROCEDURE — A27130 PR TOTAL HIP ARTHROPLASTY

## 2025-02-11 PROCEDURE — 7100000002 HC RECOVERY ROOM TIME - EACH INCREMENTAL 1 MINUTE: Performed by: ORTHOPAEDIC SURGERY

## 2025-02-11 PROCEDURE — 99222 1ST HOSP IP/OBS MODERATE 55: CPT | Performed by: STUDENT IN AN ORGANIZED HEALTH CARE EDUCATION/TRAINING PROGRAM

## 2025-02-11 PROCEDURE — 3700000001 HC GENERAL ANESTHESIA TIME - INITIAL BASE CHARGE: Performed by: ORTHOPAEDIC SURGERY

## 2025-02-11 PROCEDURE — 2500000004 HC RX 250 GENERAL PHARMACY W/ HCPCS (ALT 636 FOR OP/ED): Mod: JZ | Performed by: ANESTHESIOLOGY

## 2025-02-11 PROCEDURE — 2500000001 HC RX 250 WO HCPCS SELF ADMINISTERED DRUGS (ALT 637 FOR MEDICARE OP): Performed by: ANESTHESIOLOGY

## 2025-02-11 PROCEDURE — 2500000001 HC RX 250 WO HCPCS SELF ADMINISTERED DRUGS (ALT 637 FOR MEDICARE OP): Performed by: ORTHOPAEDIC SURGERY

## 2025-02-11 PROCEDURE — 2720000007 HC OR 272 NO HCPCS: Performed by: ORTHOPAEDIC SURGERY

## 2025-02-11 PROCEDURE — 3600000018 HC OR TIME - INITIAL BASE CHARGE - PROCEDURE LEVEL SIX: Performed by: ORTHOPAEDIC SURGERY

## 2025-02-11 PROCEDURE — 2500000004 HC RX 250 GENERAL PHARMACY W/ HCPCS (ALT 636 FOR OP/ED): Mod: JZ

## 2025-02-11 PROCEDURE — C1776 JOINT DEVICE (IMPLANTABLE): HCPCS | Performed by: ORTHOPAEDIC SURGERY

## 2025-02-11 PROCEDURE — A27130 PR TOTAL HIP ARTHROPLASTY: Performed by: ANESTHESIOLOGY

## 2025-02-11 PROCEDURE — 2780000003 HC OR 278 NO HCPCS: Performed by: ORTHOPAEDIC SURGERY

## 2025-02-11 PROCEDURE — 86900 BLOOD TYPING SEROLOGIC ABO: CPT | Performed by: ANESTHESIOLOGY

## 2025-02-11 PROCEDURE — 7100000011 HC EXTENDED STAY RECOVERY HOURLY - NURSING UNIT

## 2025-02-11 PROCEDURE — 3700000002 HC GENERAL ANESTHESIA TIME - EACH INCREMENTAL 1 MINUTE: Performed by: ORTHOPAEDIC SURGERY

## 2025-02-11 DEVICE — IMPLANTABLE DEVICE: Type: IMPLANTABLE DEVICE | Site: HIP | Status: FUNCTIONAL

## 2025-02-11 RX ORDER — HYDROMORPHONE HYDROCHLORIDE 1 MG/ML
INJECTION, SOLUTION INTRAMUSCULAR; INTRAVENOUS; SUBCUTANEOUS AS NEEDED
Status: DISCONTINUED | OUTPATIENT
Start: 2025-02-11 | End: 2025-02-11

## 2025-02-11 RX ORDER — POLYETHYLENE GLYCOL 3350 17 G/17G
17 POWDER, FOR SOLUTION ORAL DAILY
Status: DISCONTINUED | OUTPATIENT
Start: 2025-02-11 | End: 2025-02-12 | Stop reason: HOSPADM

## 2025-02-11 RX ORDER — TRAMADOL HYDROCHLORIDE 50 MG/1
50 TABLET ORAL EVERY 6 HOURS PRN
Status: DISCONTINUED | OUTPATIENT
Start: 2025-02-11 | End: 2025-02-12 | Stop reason: HOSPADM

## 2025-02-11 RX ORDER — SCOPOLAMINE 1 MG/3D
PATCH, EXTENDED RELEASE TRANSDERMAL
Status: DISPENSED
Start: 2025-02-11 | End: 2025-02-11

## 2025-02-11 RX ORDER — CELECOXIB 100 MG/1
100 CAPSULE ORAL ONCE
Status: COMPLETED | OUTPATIENT
Start: 2025-02-11 | End: 2025-02-11

## 2025-02-11 RX ORDER — ONDANSETRON HYDROCHLORIDE 2 MG/ML
4 INJECTION, SOLUTION INTRAVENOUS EVERY 8 HOURS PRN
Status: DISCONTINUED | OUTPATIENT
Start: 2025-02-11 | End: 2025-02-12 | Stop reason: HOSPADM

## 2025-02-11 RX ORDER — HYDROMORPHONE HYDROCHLORIDE 1 MG/ML
1 INJECTION, SOLUTION INTRAMUSCULAR; INTRAVENOUS; SUBCUTANEOUS EVERY 5 MIN PRN
Status: DISCONTINUED | OUTPATIENT
Start: 2025-02-11 | End: 2025-02-11 | Stop reason: HOSPADM

## 2025-02-11 RX ORDER — ONDANSETRON HYDROCHLORIDE 2 MG/ML
4 INJECTION, SOLUTION INTRAVENOUS ONCE AS NEEDED
Status: DISCONTINUED | OUTPATIENT
Start: 2025-02-11 | End: 2025-02-11 | Stop reason: HOSPADM

## 2025-02-11 RX ORDER — PHENYLEPHRINE HCL IN 0.9% NACL 1 MG/10 ML
SYRINGE (ML) INTRAVENOUS AS NEEDED
Status: DISCONTINUED | OUTPATIENT
Start: 2025-02-11 | End: 2025-02-11

## 2025-02-11 RX ORDER — LABETALOL HYDROCHLORIDE 5 MG/ML
10 INJECTION, SOLUTION INTRAVENOUS ONCE AS NEEDED
Status: DISCONTINUED | OUTPATIENT
Start: 2025-02-11 | End: 2025-02-11 | Stop reason: HOSPADM

## 2025-02-11 RX ORDER — MEPERIDINE HYDROCHLORIDE 50 MG/ML
12.5 INJECTION INTRAMUSCULAR; INTRAVENOUS; SUBCUTANEOUS EVERY 10 MIN PRN
Status: DISCONTINUED | OUTPATIENT
Start: 2025-02-11 | End: 2025-02-11 | Stop reason: HOSPADM

## 2025-02-11 RX ORDER — ONDANSETRON 4 MG/1
4 TABLET, FILM COATED ORAL EVERY 8 HOURS PRN
Status: DISCONTINUED | OUTPATIENT
Start: 2025-02-11 | End: 2025-02-12 | Stop reason: HOSPADM

## 2025-02-11 RX ORDER — HYDROMORPHONE HYDROCHLORIDE 1 MG/ML
INJECTION, SOLUTION INTRAMUSCULAR; INTRAVENOUS; SUBCUTANEOUS
Status: COMPLETED
Start: 2025-02-11 | End: 2025-02-11

## 2025-02-11 RX ORDER — SODIUM CHLORIDE, SODIUM LACTATE, POTASSIUM CHLORIDE, CALCIUM CHLORIDE 600; 310; 30; 20 MG/100ML; MG/100ML; MG/100ML; MG/100ML
75 INJECTION, SOLUTION INTRAVENOUS CONTINUOUS
Status: DISCONTINUED | OUTPATIENT
Start: 2025-02-11 | End: 2025-02-12 | Stop reason: HOSPADM

## 2025-02-11 RX ORDER — PANTOPRAZOLE SODIUM 40 MG/1
40 TABLET, DELAYED RELEASE ORAL
Status: DISCONTINUED | OUTPATIENT
Start: 2025-02-11 | End: 2025-02-12 | Stop reason: HOSPADM

## 2025-02-11 RX ORDER — TRANEXAMIC ACID 10 MG/ML
1000 INJECTION, SOLUTION INTRAVENOUS ONCE
Status: COMPLETED | OUTPATIENT
Start: 2025-02-11 | End: 2025-02-11

## 2025-02-11 RX ORDER — LIDOCAINE HCL/PF 100 MG/5ML
SYRINGE (ML) INTRAVENOUS AS NEEDED
Status: DISCONTINUED | OUTPATIENT
Start: 2025-02-11 | End: 2025-02-11

## 2025-02-11 RX ORDER — KETOROLAC TROMETHAMINE 30 MG/ML
15 INJECTION, SOLUTION INTRAMUSCULAR; INTRAVENOUS EVERY 8 HOURS SCHEDULED
Status: COMPLETED | OUTPATIENT
Start: 2025-02-11 | End: 2025-02-12

## 2025-02-11 RX ORDER — TRANEXAMIC ACID 10 MG/ML
INJECTION, SOLUTION INTRAVENOUS
Status: COMPLETED
Start: 2025-02-11 | End: 2025-02-11

## 2025-02-11 RX ORDER — FENTANYL CITRATE 50 UG/ML
INJECTION, SOLUTION INTRAMUSCULAR; INTRAVENOUS AS NEEDED
Status: DISCONTINUED | OUTPATIENT
Start: 2025-02-11 | End: 2025-02-11

## 2025-02-11 RX ORDER — ROCURONIUM BROMIDE 10 MG/ML
INJECTION, SOLUTION INTRAVENOUS AS NEEDED
Status: DISCONTINUED | OUTPATIENT
Start: 2025-02-11 | End: 2025-02-11

## 2025-02-11 RX ORDER — METOPROLOL SUCCINATE 50 MG/1
50 TABLET, EXTENDED RELEASE ORAL DAILY
Status: DISCONTINUED | OUTPATIENT
Start: 2025-02-11 | End: 2025-02-12 | Stop reason: HOSPADM

## 2025-02-11 RX ORDER — SODIUM CHLORIDE, SODIUM LACTATE, POTASSIUM CHLORIDE, CALCIUM CHLORIDE 600; 310; 30; 20 MG/100ML; MG/100ML; MG/100ML; MG/100ML
INJECTION, SOLUTION INTRAVENOUS CONTINUOUS PRN
Status: DISCONTINUED | OUTPATIENT
Start: 2025-02-11 | End: 2025-02-11

## 2025-02-11 RX ORDER — ACETAMINOPHEN 325 MG/1
650 TABLET ORAL EVERY 4 HOURS PRN
Status: DISCONTINUED | OUTPATIENT
Start: 2025-02-11 | End: 2025-02-11 | Stop reason: HOSPADM

## 2025-02-11 RX ORDER — GABAPENTIN 300 MG/1
300 CAPSULE ORAL ONCE
Status: COMPLETED | OUTPATIENT
Start: 2025-02-11 | End: 2025-02-11

## 2025-02-11 RX ORDER — MIDAZOLAM HYDROCHLORIDE 1 MG/ML
INJECTION, SOLUTION INTRAMUSCULAR; INTRAVENOUS AS NEEDED
Status: DISCONTINUED | OUTPATIENT
Start: 2025-02-11 | End: 2025-02-11

## 2025-02-11 RX ORDER — OXYCODONE HYDROCHLORIDE 5 MG/1
5 TABLET ORAL EVERY 4 HOURS PRN
Status: DISCONTINUED | OUTPATIENT
Start: 2025-02-11 | End: 2025-02-12 | Stop reason: HOSPADM

## 2025-02-11 RX ORDER — SODIUM CHLORIDE 0.9 G/100ML
INJECTION, SOLUTION IRRIGATION AS NEEDED
Status: DISCONTINUED | OUTPATIENT
Start: 2025-02-11 | End: 2025-02-11 | Stop reason: HOSPADM

## 2025-02-11 RX ORDER — CEFAZOLIN SODIUM 2 G/100ML
INJECTION, SOLUTION INTRAVENOUS
Status: COMPLETED
Start: 2025-02-11 | End: 2025-02-11

## 2025-02-11 RX ORDER — CEFAZOLIN SODIUM 2 G/100ML
2 INJECTION, SOLUTION INTRAVENOUS EVERY 8 HOURS
Status: COMPLETED | OUTPATIENT
Start: 2025-02-11 | End: 2025-02-12

## 2025-02-11 RX ORDER — ONDANSETRON HYDROCHLORIDE 2 MG/ML
INJECTION, SOLUTION INTRAVENOUS AS NEEDED
Status: DISCONTINUED | OUTPATIENT
Start: 2025-02-11 | End: 2025-02-11

## 2025-02-11 RX ORDER — ACETAMINOPHEN 325 MG/1
650 TABLET ORAL EVERY 6 HOURS SCHEDULED
Status: DISCONTINUED | OUTPATIENT
Start: 2025-02-11 | End: 2025-02-11

## 2025-02-11 RX ORDER — HYDRALAZINE HYDROCHLORIDE 20 MG/ML
10 INJECTION INTRAMUSCULAR; INTRAVENOUS EVERY 30 MIN PRN
Status: DISCONTINUED | OUTPATIENT
Start: 2025-02-11 | End: 2025-02-11 | Stop reason: HOSPADM

## 2025-02-11 RX ORDER — DIPHENHYDRAMINE HYDROCHLORIDE 50 MG/ML
12.5 INJECTION INTRAMUSCULAR; INTRAVENOUS ONCE AS NEEDED
Status: DISCONTINUED | OUTPATIENT
Start: 2025-02-11 | End: 2025-02-11 | Stop reason: HOSPADM

## 2025-02-11 RX ORDER — OXYCODONE HYDROCHLORIDE 5 MG/1
10 TABLET ORAL EVERY 6 HOURS PRN
Status: DISCONTINUED | OUTPATIENT
Start: 2025-02-11 | End: 2025-02-12 | Stop reason: HOSPADM

## 2025-02-11 RX ORDER — CEFAZOLIN SODIUM 2 G/100ML
2 INJECTION, SOLUTION INTRAVENOUS ONCE
Status: COMPLETED | OUTPATIENT
Start: 2025-02-11 | End: 2025-02-11

## 2025-02-11 RX ORDER — PROPOFOL 10 MG/ML
INJECTION, EMULSION INTRAVENOUS AS NEEDED
Status: DISCONTINUED | OUTPATIENT
Start: 2025-02-11 | End: 2025-02-11

## 2025-02-11 RX ORDER — ACETAMINOPHEN 325 MG/1
650 TABLET ORAL ONCE
Status: COMPLETED | OUTPATIENT
Start: 2025-02-11 | End: 2025-02-11

## 2025-02-11 RX ORDER — ACETAMINOPHEN 325 MG/1
975 TABLET ORAL EVERY 6 HOURS SCHEDULED
Status: DISCONTINUED | OUTPATIENT
Start: 2025-02-11 | End: 2025-02-12 | Stop reason: HOSPADM

## 2025-02-11 RX ORDER — APREPITANT 40 MG/1
CAPSULE ORAL
Status: DISPENSED
Start: 2025-02-11 | End: 2025-02-11

## 2025-02-11 RX ORDER — AZELASTINE 1 MG/ML
2 SPRAY, METERED NASAL 2 TIMES DAILY
Status: DISCONTINUED | OUTPATIENT
Start: 2025-02-11 | End: 2025-02-12 | Stop reason: HOSPADM

## 2025-02-11 RX ORDER — SODIUM CHLORIDE, SODIUM LACTATE, POTASSIUM CHLORIDE, CALCIUM CHLORIDE 600; 310; 30; 20 MG/100ML; MG/100ML; MG/100ML; MG/100ML
100 INJECTION, SOLUTION INTRAVENOUS CONTINUOUS
Status: DISCONTINUED | OUTPATIENT
Start: 2025-02-11 | End: 2025-02-11 | Stop reason: HOSPADM

## 2025-02-11 RX ADMIN — CEFAZOLIN SODIUM 2 G: 2 INJECTION, SOLUTION INTRAVENOUS at 23:28

## 2025-02-11 RX ADMIN — PROPOFOL 200 MG: 10 INJECTION, EMULSION INTRAVENOUS at 07:40

## 2025-02-11 RX ADMIN — HYDROMORPHONE HYDROCHLORIDE 1 MG: 1 INJECTION, SOLUTION INTRAMUSCULAR; INTRAVENOUS; SUBCUTANEOUS at 09:23

## 2025-02-11 RX ADMIN — POVIDONE-IODINE 1 APPLICATION: 5 SOLUTION TOPICAL at 07:05

## 2025-02-11 RX ADMIN — TRAMADOL HYDROCHLORIDE 50 MG: 50 TABLET, COATED ORAL at 23:29

## 2025-02-11 RX ADMIN — SUGAMMADEX 200 MG: 100 INJECTION, SOLUTION INTRAVENOUS at 09:18

## 2025-02-11 RX ADMIN — POLYETHYLENE GLYCOL 3350 17 G: 17 POWDER, FOR SOLUTION ORAL at 20:36

## 2025-02-11 RX ADMIN — LIDOCAINE HYDROCHLORIDE 40 MG: 20 INJECTION INTRAVENOUS at 07:40

## 2025-02-11 RX ADMIN — DEXAMETHASONE SODIUM PHOSPHATE 8 MG: 4 INJECTION, SOLUTION INTRAMUSCULAR; INTRAVENOUS at 08:05

## 2025-02-11 RX ADMIN — Medication 200 MCG: at 09:09

## 2025-02-11 RX ADMIN — Medication 200 MCG: at 08:16

## 2025-02-11 RX ADMIN — AZELASTINE HYDROCHLORIDE 2 SPRAY: 137 SPRAY, METERED NASAL at 20:36

## 2025-02-11 RX ADMIN — HYDROMORPHONE HYDROCHLORIDE 1 MG: 1 INJECTION, SOLUTION INTRAMUSCULAR; INTRAVENOUS; SUBCUTANEOUS at 09:58

## 2025-02-11 RX ADMIN — MIDAZOLAM 2 MG: 1 INJECTION INTRAMUSCULAR; INTRAVENOUS at 07:35

## 2025-02-11 RX ADMIN — GABAPENTIN 300 MG: 300 CAPSULE ORAL at 07:03

## 2025-02-11 RX ADMIN — OXYCODONE HYDROCHLORIDE 10 MG: 5 TABLET ORAL at 20:36

## 2025-02-11 RX ADMIN — PANTOPRAZOLE SODIUM 40 MG: 40 TABLET, DELAYED RELEASE ORAL at 17:01

## 2025-02-11 RX ADMIN — ACETAMINOPHEN 650 MG: 325 TABLET, FILM COATED ORAL at 07:03

## 2025-02-11 RX ADMIN — TRAMADOL HYDROCHLORIDE 50 MG: 50 TABLET, COATED ORAL at 15:41

## 2025-02-11 RX ADMIN — CEFAZOLIN SODIUM 2 G: 2 INJECTION, SOLUTION INTRAVENOUS at 17:03

## 2025-02-11 RX ADMIN — KETOROLAC TROMETHAMINE 15 MG: 30 INJECTION, SOLUTION INTRAMUSCULAR at 23:28

## 2025-02-11 RX ADMIN — CELECOXIB 100 MG: 100 CAPSULE ORAL at 07:03

## 2025-02-11 RX ADMIN — HYDROMORPHONE HYDROCHLORIDE 1 MG: 1 INJECTION, SOLUTION INTRAMUSCULAR; INTRAVENOUS; SUBCUTANEOUS at 09:45

## 2025-02-11 RX ADMIN — ACETAMINOPHEN 975 MG: 325 TABLET, FILM COATED ORAL at 23:28

## 2025-02-11 RX ADMIN — Medication 200 MCG: at 09:06

## 2025-02-11 RX ADMIN — ACETAMINOPHEN 975 MG: 325 TABLET, FILM COATED ORAL at 17:01

## 2025-02-11 RX ADMIN — SODIUM CHLORIDE, POTASSIUM CHLORIDE, SODIUM LACTATE AND CALCIUM CHLORIDE 75 ML/HR: 600; 310; 30; 20 INJECTION, SOLUTION INTRAVENOUS at 20:43

## 2025-02-11 RX ADMIN — SODIUM CHLORIDE, POTASSIUM CHLORIDE, SODIUM LACTATE AND CALCIUM CHLORIDE: 600; 310; 30; 20 INJECTION, SOLUTION INTRAVENOUS at 09:27

## 2025-02-11 RX ADMIN — TRANEXAMIC ACID 1000 MG: 10 INJECTION, SOLUTION INTRAVENOUS at 07:59

## 2025-02-11 RX ADMIN — FENTANYL CITRATE 100 MCG: 50 INJECTION, SOLUTION INTRAMUSCULAR; INTRAVENOUS at 07:35

## 2025-02-11 RX ADMIN — ONDANSETRON 4 MG: 2 INJECTION INTRAMUSCULAR; INTRAVENOUS at 09:02

## 2025-02-11 RX ADMIN — OXYCODONE HYDROCHLORIDE 10 MG: 5 TABLET ORAL at 14:16

## 2025-02-11 RX ADMIN — SODIUM CHLORIDE, POTASSIUM CHLORIDE, SODIUM LACTATE AND CALCIUM CHLORIDE: 600; 310; 30; 20 INJECTION, SOLUTION INTRAVENOUS at 07:29

## 2025-02-11 RX ADMIN — Medication 200 MCG: at 08:01

## 2025-02-11 RX ADMIN — ROCURONIUM BROMIDE 20 MG: 10 INJECTION, SOLUTION INTRAVENOUS at 08:29

## 2025-02-11 RX ADMIN — CEFAZOLIN SODIUM 2 G: 2 INJECTION, SOLUTION INTRAVENOUS at 07:30

## 2025-02-11 RX ADMIN — SODIUM CHLORIDE, POTASSIUM CHLORIDE, SODIUM LACTATE AND CALCIUM CHLORIDE 75 ML/HR: 600; 310; 30; 20 INJECTION, SOLUTION INTRAVENOUS at 14:17

## 2025-02-11 RX ADMIN — HYDROMORPHONE HYDROCHLORIDE 0.5 MG: 1 INJECTION, SOLUTION INTRAMUSCULAR; INTRAVENOUS; SUBCUTANEOUS at 11:30

## 2025-02-11 RX ADMIN — ROCURONIUM BROMIDE 50 MG: 10 INJECTION, SOLUTION INTRAVENOUS at 07:41

## 2025-02-11 RX ADMIN — TRANEXAMIC ACID 1000 MG: 10 INJECTION, SOLUTION INTRAVENOUS at 09:00

## 2025-02-11 SDOH — SOCIAL STABILITY: SOCIAL INSECURITY: HAVE YOU HAD ANY THOUGHTS OF HARMING ANYONE ELSE?: NO

## 2025-02-11 SDOH — SOCIAL STABILITY: SOCIAL INSECURITY
WITHIN THE LAST YEAR, HAVE YOU BEEN RAPED OR FORCED TO HAVE ANY KIND OF SEXUAL ACTIVITY BY YOUR PARTNER OR EX-PARTNER?: NO

## 2025-02-11 SDOH — SOCIAL STABILITY: SOCIAL INSECURITY: ARE YOU OR HAVE YOU BEEN THREATENED OR ABUSED PHYSICALLY, EMOTIONALLY, OR SEXUALLY BY ANYONE?: NO

## 2025-02-11 SDOH — ECONOMIC STABILITY: FOOD INSECURITY: WITHIN THE PAST 12 MONTHS, THE FOOD YOU BOUGHT JUST DIDN'T LAST AND YOU DIDN'T HAVE MONEY TO GET MORE.: NEVER TRUE

## 2025-02-11 SDOH — SOCIAL STABILITY: SOCIAL INSECURITY: ABUSE: ADULT

## 2025-02-11 SDOH — SOCIAL STABILITY: SOCIAL INSECURITY: DO YOU FEEL UNSAFE GOING BACK TO THE PLACE WHERE YOU ARE LIVING?: NO

## 2025-02-11 SDOH — SOCIAL STABILITY: SOCIAL INSECURITY: WERE YOU ABLE TO COMPLETE ALL THE BEHAVIORAL HEALTH SCREENINGS?: YES

## 2025-02-11 SDOH — ECONOMIC STABILITY: INCOME INSECURITY: IN THE PAST 12 MONTHS HAS THE ELECTRIC, GAS, OIL, OR WATER COMPANY THREATENED TO SHUT OFF SERVICES IN YOUR HOME?: NO

## 2025-02-11 SDOH — SOCIAL STABILITY: SOCIAL INSECURITY: WITHIN THE LAST YEAR, HAVE YOU BEEN AFRAID OF YOUR PARTNER OR EX-PARTNER?: NO

## 2025-02-11 SDOH — SOCIAL STABILITY: SOCIAL INSECURITY: WITHIN THE LAST YEAR, HAVE YOU BEEN HUMILIATED OR EMOTIONALLY ABUSED IN OTHER WAYS BY YOUR PARTNER OR EX-PARTNER?: NO

## 2025-02-11 SDOH — SOCIAL STABILITY: SOCIAL INSECURITY
WITHIN THE LAST YEAR, HAVE YOU BEEN KICKED, HIT, SLAPPED, OR OTHERWISE PHYSICALLY HURT BY YOUR PARTNER OR EX-PARTNER?: NO

## 2025-02-11 SDOH — ECONOMIC STABILITY: FOOD INSECURITY: WITHIN THE PAST 12 MONTHS, YOU WORRIED THAT YOUR FOOD WOULD RUN OUT BEFORE YOU GOT THE MONEY TO BUY MORE.: NEVER TRUE

## 2025-02-11 SDOH — ECONOMIC STABILITY: HOUSING INSECURITY: DO YOU FEEL UNSAFE GOING BACK TO THE PLACE WHERE YOU LIVE?: NO

## 2025-02-11 SDOH — SOCIAL STABILITY: SOCIAL INSECURITY: DOES ANYONE TRY TO KEEP YOU FROM HAVING/CONTACTING OTHER FRIENDS OR DOING THINGS OUTSIDE YOUR HOME?: NO

## 2025-02-11 SDOH — SOCIAL STABILITY: SOCIAL INSECURITY: HAVE YOU HAD THOUGHTS OF HARMING ANYONE ELSE?: NO

## 2025-02-11 SDOH — SOCIAL STABILITY: SOCIAL INSECURITY: DO YOU FEEL ANYONE HAS EXPLOITED OR TAKEN ADVANTAGE OF YOU FINANCIALLY OR OF YOUR PERSONAL PROPERTY?: NO

## 2025-02-11 SDOH — SOCIAL STABILITY: SOCIAL INSECURITY
ASK PARENT OR GUARDIAN: ARE THERE TIMES WHEN YOU, YOUR CHILD(REN), OR ANY MEMBER OF YOUR HOUSEHOLD FEEL UNSAFE, HARMED, OR THREATENED AROUND PERSONS WITH WHOM YOU KNOW OR LIVE?: NO

## 2025-02-11 SDOH — SOCIAL STABILITY: SOCIAL INSECURITY: ARE THERE ANY APPARENT SIGNS OF INJURIES/BEHAVIORS THAT COULD BE RELATED TO ABUSE/NEGLECT?: NO

## 2025-02-11 SDOH — SOCIAL STABILITY: SOCIAL INSECURITY: HAS ANYONE EVER THREATENED TO HURT YOUR FAMILY OR YOUR PETS?: NO

## 2025-02-11 SDOH — HEALTH STABILITY: MENTAL HEALTH: CURRENT SMOKER: 0

## 2025-02-11 ASSESSMENT — PAIN DESCRIPTION - DESCRIPTORS
DESCRIPTORS: DISCOMFORT;ACHING
DESCRIPTORS: ACHING

## 2025-02-11 ASSESSMENT — PAIN SCALES - GENERAL
PAINLEVEL_OUTOF10: 10 - WORST POSSIBLE PAIN
PAINLEVEL_OUTOF10: 5 - MODERATE PAIN
PAINLEVEL_OUTOF10: 2
PAINLEVEL_OUTOF10: 10 - WORST POSSIBLE PAIN
PAINLEVEL_OUTOF10: 7
PAINLEVEL_OUTOF10: 7
PAINLEVEL_OUTOF10: 6
PAINLEVEL_OUTOF10: 8
PAINLEVEL_OUTOF10: 7
PAINLEVEL_OUTOF10: 10 - WORST POSSIBLE PAIN
PAINLEVEL_OUTOF10: 2
PAINLEVEL_OUTOF10: 8
PAINLEVEL_OUTOF10: 10 - WORST POSSIBLE PAIN
PAINLEVEL_OUTOF10: 8

## 2025-02-11 ASSESSMENT — COGNITIVE AND FUNCTIONAL STATUS - GENERAL
STANDING UP FROM CHAIR USING ARMS: A LOT
MOBILITY SCORE: 18
PATIENT BASELINE BEDBOUND: NO
MOVING FROM LYING ON BACK TO SITTING ON SIDE OF FLAT BED WITH BEDRAILS: A LITTLE
TOILETING: A LOT
STANDING UP FROM CHAIR USING ARMS: A LITTLE
CLIMB 3 TO 5 STEPS WITH RAILING: A LITTLE
MOVING TO AND FROM BED TO CHAIR: A LITTLE
DRESSING REGULAR UPPER BODY CLOTHING: A LITTLE
TURNING FROM BACK TO SIDE WHILE IN FLAT BAD: A LITTLE
HELP NEEDED FOR BATHING: A LOT
WALKING IN HOSPITAL ROOM: TOTAL
HELP NEEDED FOR BATHING: A LITTLE
DAILY ACTIVITIY SCORE: 17
MOVING FROM LYING ON BACK TO SITTING ON SIDE OF FLAT BED WITH BEDRAILS: A LOT
MOBILITY SCORE: 10
DRESSING REGULAR LOWER BODY CLOTHING: A LOT
MOVING TO AND FROM BED TO CHAIR: A LOT
DAILY ACTIVITIY SCORE: 20
TOILETING: A LITTLE
WALKING IN HOSPITAL ROOM: A LITTLE
TURNING FROM BACK TO SIDE WHILE IN FLAT BAD: A LOT
DRESSING REGULAR LOWER BODY CLOTHING: A LITTLE
CLIMB 3 TO 5 STEPS WITH RAILING: TOTAL
DRESSING REGULAR UPPER BODY CLOTHING: A LITTLE

## 2025-02-11 ASSESSMENT — PATIENT HEALTH QUESTIONNAIRE - PHQ9
SUM OF ALL RESPONSES TO PHQ9 QUESTIONS 1 & 2: 0
2. FEELING DOWN, DEPRESSED OR HOPELESS: NOT AT ALL
1. LITTLE INTEREST OR PLEASURE IN DOING THINGS: NOT AT ALL

## 2025-02-11 ASSESSMENT — PAIN - FUNCTIONAL ASSESSMENT

## 2025-02-11 ASSESSMENT — LIFESTYLE VARIABLES
HOW MANY STANDARD DRINKS CONTAINING ALCOHOL DO YOU HAVE ON A TYPICAL DAY: PATIENT DOES NOT DRINK
AUDIT-C TOTAL SCORE: 0
HOW OFTEN DO YOU HAVE 6 OR MORE DRINKS ON ONE OCCASION: NEVER
AUDIT-C TOTAL SCORE: 0
HOW OFTEN DO YOU HAVE A DRINK CONTAINING ALCOHOL: NEVER
SKIP TO QUESTIONS 9-10: 1

## 2025-02-11 ASSESSMENT — ACTIVITIES OF DAILY LIVING (ADL)
DRESSING YOURSELF: INDEPENDENT
LACK_OF_TRANSPORTATION: NO
JUDGMENT_ADEQUATE_SAFELY_COMPLETE_DAILY_ACTIVITIES: YES
HEARING - LEFT EAR: FUNCTIONAL
PATIENT'S MEMORY ADEQUATE TO SAFELY COMPLETE DAILY ACTIVITIES?: YES
HEARING - RIGHT EAR: FUNCTIONAL
BATHING: INDEPENDENT
FEEDING YOURSELF: INDEPENDENT
TOILETING: NEEDS ASSISTANCE
WALKS IN HOME: INDEPENDENT
ADEQUATE_TO_COMPLETE_ADL: YES
GROOMING: INDEPENDENT

## 2025-02-11 ASSESSMENT — PAIN DESCRIPTION - ORIENTATION
ORIENTATION: LEFT
ORIENTATION: LEFT

## 2025-02-11 ASSESSMENT — PAIN SCALES - PAIN ASSESSMENT IN ADVANCED DEMENTIA (PAINAD): TOTALSCORE: MEDICATION (SEE MAR)

## 2025-02-11 ASSESSMENT — COLUMBIA-SUICIDE SEVERITY RATING SCALE - C-SSRS
1. IN THE PAST MONTH, HAVE YOU WISHED YOU WERE DEAD OR WISHED YOU COULD GO TO SLEEP AND NOT WAKE UP?: NO
2. HAVE YOU ACTUALLY HAD ANY THOUGHTS OF KILLING YOURSELF?: NO
6. HAVE YOU EVER DONE ANYTHING, STARTED TO DO ANYTHING, OR PREPARED TO DO ANYTHING TO END YOUR LIFE?: NO

## 2025-02-11 ASSESSMENT — PAIN DESCRIPTION - LOCATION
LOCATION: HIP
LOCATION: HIP

## 2025-02-11 NOTE — CARE PLAN
Problem: Skin  Goal: Decreased wound size/increased tissue granulation at next dressing change  Outcome: Progressing  Goal: Participates in plan/prevention/treatment measures  Outcome: Progressing  Goal: Prevent/manage excess moisture  Outcome: Progressing  Goal: Prevent/minimize sheer/friction injuries  Outcome: Progressing  Goal: Promote/optimize nutrition  Outcome: Progressing  Goal: Promote skin healing  Outcome: Progressing     Problem: Pain - Adult  Goal: Verbalizes/displays adequate comfort level or baseline comfort level  Outcome: Progressing     Problem: Safety - Adult  Goal: Free from fall injury  Outcome: Progressing     Problem: Discharge Planning  Goal: Discharge to home or other facility with appropriate resources  Outcome: Progressing     Problem: Chronic Conditions and Co-morbidities  Goal: Patient's chronic conditions and co-morbidity symptoms are monitored and maintained or improved  Outcome: Progressing     Problem: Nutrition  Goal: Nutrient intake appropriate for maintaining nutritional needs  Outcome: Progressing     Problem: Pain  Goal: Takes deep breaths with improved pain control throughout the shift  Outcome: Progressing  Goal: Turns in bed with improved pain control throughout the shift  Outcome: Progressing  Goal: Walks with improved pain control throughout the shift  Outcome: Progressing  Goal: Performs ADL's with improved pain control throughout shift  Outcome: Progressing  Goal: Participates in PT with improved pain control throughout the shift  Outcome: Progressing  Goal: Free from opioid side effects throughout the shift  Outcome: Progressing  Goal: Free from acute confusion related to pain meds throughout the shift  Outcome: Progressing     Problem: Fall/Injury  Goal: Not fall by end of shift  Outcome: Progressing  Goal: Be free from injury by end of the shift  Outcome: Progressing  Goal: Verbalize understanding of personal risk factors for fall in the hospital  Outcome:  Progressing  Goal: Verbalize understanding of risk factor reduction measures to prevent injury from fall in the home  Outcome: Progressing  Goal: Use assistive devices by end of the shift  Outcome: Progressing  Goal: Pace activities to prevent fatigue by end of the shift  Outcome: Progressing

## 2025-02-11 NOTE — ANESTHESIA PROCEDURE NOTES
Airway  Date/Time: 2/11/2025 7:43 AM  Urgency: elective    Airway not difficult    Staffing  Performed: resident and attending   Authorized by: Woo Figueroa MD    Performed by: CYN Reilly-PEARL  Patient location during procedure: OR    Indications and Patient Condition  Indications for airway management: anesthesia and airway protection  Spontaneous Ventilation: absent  Sedation level: deep  Preoxygenated: yes  Patient position: sniffing  MILS maintained throughout  Mask difficulty assessment: 1 - vent by mask  Planned trial extubation    Final Airway Details  Final airway type: endotracheal airway      Successful airway: ETT  Cuffed: yes   Successful intubation technique: direct laryngoscopy  Facilitating devices/methods: intubating stylet  Endotracheal tube insertion site: oral  Blade: Jamie  Blade size: #3  ETT size (mm): 7.0  Cormack-Lehane Classification: grade I - full view of glottis  Placement verified by: chest auscultation and capnometry   Cuff volume (mL): 10  Measured from: lips  ETT to lips (cm): 21  Number of attempts at approach: 1  Ventilation between attempts: none  Number of other approaches attempted: 0    Additional Comments  Atraumatic intubation by ED resident with dentition in preanesthetic state.

## 2025-02-11 NOTE — ANESTHESIA PREPROCEDURE EVALUATION
Patient: Nicolasa Burger    Procedure Information       Date/Time: 02/11/25 0730    Procedure: LEFT TOTAL HIP REPLACEMENT ANTERIOR APPROACH WITH C-ARM (Left: Hip) - LEFT TOTAL HIP REPLACEMENT ANTERIOR APPROACH EXTENDED RECOVERY    Location: [facility] / [facility]    Surgeons: Neo Brooke MD            Relevant Problems   Anesthesia   (+) PONV (postoperative nausea and vomiting)      Cardiac   (+) Benign essential hypertension   (+) Hyperlipidemia   (+) PFO (patent foramen ovale) ([facility])   (+) Prolonged QT interval      Neuro   (+) Anxiety   (+) CVA (cerebral vascular accident) (Multi)   (+) Depressive disorder   (+) PEDRO (generalized anxiety disorder)   (+) Mononeuropathy of left median nerve   (+) Mononeuropathy of right median nerve   (+) Nerve root disorder      GI   (+) Dysphagia   (+) GERD (gastroesophageal reflux disease)      Liver   (+) Elevated LFTs      HEENT   (+) Seasonal allergies      ID   (+) COVID-19   (+) Dermatophytosis   (+) Herpes zoster      Skin   (+) Rash       Clinical information reviewed:    Allergies  Meds               NPO Detail:  NPO/Void Status  Date of Last Liquid: 02/10/25  Time of Last Liquid: 2230  Date of Last Solid: 02/10/25  Time of Last Solid: 1700         Physical Exam    Airway  Mallampati: I  TM distance: >3 FB  Neck ROM: full     Cardiovascular - normal exam     Dental - normal exam     Pulmonary - normal exam     Abdominal - normal exam             Anesthesia Plan    History of general anesthesia?: yes  History of complications of general anesthesia?: no    ASA 2     general     The patient is not a current smoker.  Patient was previously instructed to abstain from smoking on day of procedure.  Patient did not smoke on day of procedure.    intravenous induction   Postoperative administration of opioids is intended.  Anesthetic plan and risks discussed with patient.  Use of blood products discussed with patient who consented to blood products.    Plan discussed  with CAA.

## 2025-02-11 NOTE — PROGRESS NOTES
Physical Therapy    Physical Therapy Evaluation    Patient Name: Nicolasa Burger  MRN: [id number]  Today's Date: 2/11/2025   Time Calculation  Start Time: 1336  Stop Time: 1405  Time Calculation (min): 29 min  213/213-A    Assessment/Plan   PT Assessment  PT Assessment Results: Decreased strength, Decreased endurance, Impaired balance, Decreased mobility, Impaired judgement, Decreased safety awareness, Pain, Orthopedic restrictions  Rehab Prognosis: Good  Barriers to Discharge Home: Physical needs  Physical Needs: Stair navigation into home limited by function/safety, Ambulating household distances limited by function/safety  Evaluation/Treatment Tolerance: Patient limited by pain  End of Session Communication: Bedside nurse  Assessment Comment: Continued skilled PT intervention indicated to facilitate increased strength, balance & gait stability  End of Session Patient Position: Bed, 2 rail up, Alarm on (hob elevated to comfort, call light in reach, foot of bed locked in neutral extension, scd's donned/activated, cold pack lt hip)  IP OR SWING BED PT PLAN  Inpatient or Swing Bed: Inpatient  PT Plan  Treatment/Interventions: Bed mobility, Transfer training, Gait training, Stair training, Therapeutic exercise, Therapeutic activity, Home exercise program  PT Plan: Ongoing PT  PT Frequency: BID  PT Discharge Recommendations: Low intensity level of continued care (w/ initial 24hr support for safe transition home postop)  PT Recommended Transfer Status: Assist x2  PT - OK to Discharge: Yes (when cleared by medical team)    Subjective     Current Problem:  1. Preop testing  chlorhexidine (Hibiclens) 4 % external liquid      2. Primary osteoarthritis of left hip  Surgical Pathology Exam    Surgical Pathology Exam        Patient Active Problem List   Diagnosis    Fall    Allergic urticaria    Anxiety    Attention deficit hyperactivity disorder (ADHD)    Benign essential hypertension    Delirium    CVA (cerebral vascular  accident) (Multi)    Dermatophytosis    Dizziness    Dysphagia    Elevated LFTs    Fatigue    Flank pain    PEDRO (generalized anxiety disorder)    Gastrointestinal symptom    Loss of hair    Head injury    Headache    Hyperlipidemia    Hypoxia    Injury of right shoulder    Insomnia    Myalgia    Numbness and tingling sensation of skin    Palpitations    Parosmia    History of severe acute respiratory syndrome coronavirus 2 (SARS-CoV-2) disease    PFO (patent foramen ovale) ([facility])    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    Nerve root disorder    Rash    Seasonal allergies    Obstructive sleep apnea syndrome    Sleep disorder breathing    Cervical paraspinal muscle spasm    Thyroid fullness    Tremor    Vitamin D deficiency    COVID-19    Chronic dyspnea    Cystocele, midline    Depressive disorder    GERD (gastroesophageal reflux disease)    History of endometrial ablation    History of uterine fibroid    Injury of wrist    Lateral epicondylitis    Child attention deficit disorder    Hip pain    Trigger finger of right thumb    Trigger finger of left thumb    Speech disturbance    Pain in both hands    Muscular atrophy    Mononeuropathy of right median nerve    Mononeuropathy of left median nerve    Arthritis of carpometacarpal (CMC) joint of right thumb    Arthritis of carpometacarpal (CMC) joint of left thumb    Impaired executive functioning    Chronic pain    Clouded consciousness    Diarrhea    Enterocolitis    Hypokalemia    Hypomagnesemia    Injury of kidney    Muscle weakness of upper extremity    Pain in throat    Prolonged QT interval    Herpes zoster    Post-acute COVID-19 syndrome    PONV (postoperative nausea and vomiting)    Arthritis of left hip       General Visit Information:  General  Reason for Referral: PT eval & treat/impaired mobility DX: lt hip oa, 2/11/25 lt thr anterior approach  Referred By: Neo Brooke  Caregiver Feedback: Per conference w/ RN patient stable to participate in  "therapy  Co-Treatment: OT  Co-Treatment Reason: to maximize pt safety/mobility  Patient Position Received: Bed, 2 rail up, Alarm off, not on at start of session  General Comment: Pleasant & cooperative, receptive to mobility& instructions; pain limited mobility & report of intermittent \"spasms\" , tends to hold breath during effort    Home Living:  Home Living  Home Living Comments: lives w/ spouse & 20yo dtr, will have 24hr support at d/c; 2steps w/ rail to enter house w/ 1st fl set up; walk in shower w/ seat, grab bar, hand held shower hose; raised toilet positioned near [facility]; standard bed    Prior Level of Function:  Prior Function Per Pt/Caregiver Report  Hand Dominance: Right  Prior Function Comments: independent mobility w/o use of device, h/o 1fall in past 6months; independent adl w/ dificulty; independent iadl; drives as does family; pt does not work outside the home    Precautions:  Precautions  Precautions Comment: fall, alarm, IV, scd's, corrective lenses, lt anterior thr wbat  Pain:  Pain Assessment  Pain Assessment:  (7/10 lt hip surgery site & surrounding muscles)    Cognition:  Cognition  Overall Cognitive Status: Within Functional Limits    General Assessments:      Activity Tolerance  Endurance: Decreased tolerance for upright activites  Sensation  Sensation Comment: chronic intermittent numbness/tingling hands, postop numbness lle     Functional Assessments:     Bed Mobility  Bed Mobility:  (mod assist x2 supine>sit hob elevated marked guarding w/ lle movement; mod assist x2 sit>supine w/ assist to manage trunk &le's)  Transfers  Transfer:  (mod assist x2 sit.>stand elevated bed>ww & BSC> ww cues for safe hand plcmt, cues for le positioning for optimal wbing; mod assistx1 stand>sit to bed & to bedside commode w/ cues & assist for safe hand plcmt, safe positioning, & eccentric control)  Ambulation/Gait Training  Ambulation/Gait Training Performed:  (mod assist x2 w/ ww & gait belt ~3ft x2 pivot " steps bed<>bedside commode, assist to manage ww, cues for le sequencing, cues for breathing, guarding of lle for stability/no overt buckling but w/ general instability due to guarding)          Extremity/Trunk Assessments:        RLE   RLE : Within Functional Limits  LLE   LLE :  (guarded but aarom grossly wfl w/in postop precautions; strength: hip flexion 2/5 knee ext 3+/5 ankle df 3+/5 w/ painful giving away)    Outcome Measures:     [facility] Basic Mobility  Turning from your back to your side while in a flat bed without using bedrails: A lot  Moving from lying on your back to sitting on the side of a flat bed without using bedrails: A lot  Moving to and from bed to chair (including a wheelchair): A lot  Standing up from a chair using your arms (e.g. wheelchair or bedside chair): A lot  To walk in hospital room: Total  Climbing 3-5 steps with railing: Total  Basic Mobility - Total Score: 10     Goals:  Encounter Problems       Encounter Problems (Active)       PT Problem       STG - Pt will transition supine <> sitting with supervision  (Progressing)       Start:  02/11/25    Expected End:  02/13/25            STG - Pt will transfer STS with sba  (Progressing)       Start:  02/11/25    Expected End:  02/13/25            STG - Pt will amb >=100' using ww with sba  (Progressing)       Start:  02/11/25    Expected End:  02/13/25            STG -  Pt will navigate >=3 stairs using rail with cga  (Progressing)       Start:  02/11/25    Expected End:  02/13/25            STG - Pt will perform a postop thr ther ex program of 2-3 sets of 10  (Progressing)       Start:  02/11/25    Expected End:  02/13/25               Pain - Adult            Education Documentation  Mobility Training, taught by Taina Niño, PT at 2/11/2025  2:54 PM.  Learner: Patient  Readiness: Acceptance  Method: Explanation  Response: Verbalizes Understanding, Needs Reinforcement  Comment: safety, activity progression, use of ww, thr postop  precautions

## 2025-02-11 NOTE — OP NOTE
PRE OP DIAGNOSIS - OA LEFT HIP    POST OP DIAGNOSIS- OA LEFT HIP     PROCEDURE - LEFT THR ANTERIOR    SURGEON- MORALES HERNÁNDEZ MD    ASSIST-    DEWAYNE RIOS    ANESTHESIA- GENERAL    BLOOD LOSS  200    FINDINGS- SEVERE OA LEFT HIP    TOURNIQUET- NONE    DRAIN- NONE    IMPLANTS    STEM  STANDARD COLLAR CORAIL SIZE 12  CUP  PINNACLE GRIPTION SIZE 46  LINER NEUTRAL SIZE28  HEAD  28 +1.5 CERAMIC     OPERATIVE PROCEDURE    PT WAS TAKEN TO THE OPERATING ROOM AND PLACED SUPINE ON THE HANA TABLE. UNDER ADEQUATE GENERAL ANESTHESIA PT WAS PREPPED AND DRAPED IN A STERILE MANNER. ANTERIOR APPROACH WAS MADE TO THE LEFT HIP STARTING JUST DISTAL AND LATERAL TO THE ANTERIOR SUPERIOR ILIAC SPINE AND COMING DISTALLY THROUGH THE SUBCUTANEOUS TISSUE TO THE FASCIA OVERLYING THE TENSOR FASCIA MARLENA.  THE FASCIA WAS INCISED WITH A 10 BLADE AND BLUNT FINGER DISSECTION WAS USED TO SEPARATE THE FASCIA FROM THE UNDERLYING MUSCLE.  A RETRACTOR WAS PLACED LATERAL TO THE FEMORAL NECK AND MEYERDING RETRACTORS WERE PLACED MEDIAL AND LATERAL.  THE CIRCUMFLEX VESSELS WERE CAUTERIZED.  THE ANTERIOR CAPSULE WAS CAUTERIZED AND CAPSULECTOMY WAS PERFORMED AND ADEQUATE RELEASE WAS ACHIEVED.  THE FEMORAL NECK CUT WAS MADE WITH AN OSCILLATING SAW AND A [place]APK[place] RING CUT WAS MADE AND THE HEAD WAS REMOVED FROM THE ACETABULUM.  THE ACETABULUM WAS EXPOSED AND CAPSULECTOMY WAS PERFORMED AND WAS THEN REAMED WITH SPHERICAL REAMERS 1 MM LESS THAN THE ABOVE COMPONENT. A TRIAL OF THAT REAMER SIZE WAS USED AND THEN I PRESS FIT A CUP OF THAT SIZE IN 45 DEGREES OF ABDUCTION AND 15-20 DEGREES OF ANTEVERSION WITH GOOD PURCHASE AND FIXATION.  I PLACED A NEURTRAL LINER OF THE ABOVE HEAD SIZE.  WITH APPROPRIATE POSITIONING, RETRACTORS AND RELEASES A BOX CUTTING OSTEOTOME WAS USED TO MAKE AN ENTRY INTO THE FEMUR.  A RASP WAS USED AND THEN I BROACHED THE FEMUR UP TO THE SIZE OF THE ABOVE COMPONENT. A STANDARD NECK AND ABOVE SIZED HEAD WAS USED. THE HIP WAS REDUCED AND THERE WAS  GOOD RANGE OF MOTION, GOOD STABILITY AND GOOD LEG LENGTH SYMMETRY.  THE TRIAL COMPONENTS WERE REMOVED AND THE ABOVE SIZE LINER WAS PLACED AND I PRESS FIT THE ABOVE SIZE STEM LEFT 1-2 MM PROUD D/T CONCERN OF CAUSING A FEMUR FX.  I THEN PLACED THE ABOVE SIZE HEAD AND TAPPED GENTLY ON THE MARINELLI TAPER. THE HIP WAS THEN REDUCED AND AGAIN FOUND TO HAVE GOOD RANGE OF MOTION, GOOD STABILITY AND GOOD LEG LENGTH SYMMETRY.  THE WOUND WAS IRRIGATED AND THE FASCIA WAS CLOSED WITH A RUNNING 0-VICRYL STITCH.  THE SUBCUTANEOUS TISSUE WAS CLOSED WITH INTERRUPTED 2-0 VICRYL AND THE SKIN WAS CLOSED WITH STAPLES. A DRY STERILE DRESSING WAS PLACED AND THE PATIENT RETURNED TO THE RECOVERY ROOM IN SATISFACTORY CONDITION.    THE EXCELLENT ASSISTANCE OF CERTIFIED PA WAS NECESSARY FOR SUCCESSFUL OUTCOME OF THIS SURGERY INCLUDING PREP, EXPOSURE, RETRACTING AND CLOSURE    DICTATED BY DR. MORALES HERNÁNDEZ

## 2025-02-11 NOTE — CONSULTS
Consults  History Of Present Illness:    Nicolasa Burger is a 59 y.o. female past reasonable for hypertension, hyperlipidemia, CVA status post tPA, PFO s/p closure 2022, Chronic osteoarthritis of the left hip presents for direct hospital admission for left hip replacement.  Patient typically follows with Dr. Atkins as an outpatient.  Patient occasionally has palpitations and has been oral beta-blockers for control of symptoms.  Today she denies chest discomfort.  Patient underwent surgery without complication. Current vital signs blood pressure 147/67 with a heart rate of 72.       Last Recorded Vitals:  Vitals:    02/11/25 1115 02/11/25 1130 02/11/25 1145 02/11/25 1159   BP: 140/60 136/76 175/64 147/67   BP Location:    Left arm   Patient Position:    Lying   Pulse: (!) 116 83 81 72   Resp: 16 16 16 18   Temp:    36.4 °C (97.5 °F)   TempSrc:    Temporal   SpO2: 97% 100% 96% 96%   Weight:       Height:           Last Labs:  CBC - No results in last year.  _ _ _    _      CMP - No results in last year.  _ _ _ --- _   _ _ _ _      PTT - 2/3/2025:  1:10 PM  1.0   11.6 22     Troponin I, High Sensitivity   Date/Time Value Ref Range Status   12/08/2023 09:21 AM 3 0 - 13 ng/L Final     Troponin I   Date/Time Value Ref Range Status   12/15/2022 03:06 AM 36 (H) 0 - 13 ng/L Final     Comment:     .  Less than 99th percentile of normal range cutoff-  Female and children under 18 years old <14 ng/L; Male <21 ng/L: Negative  Repeat testing should be performed if clinically indicated.   .  Female and children under 18 years old 14-50 ng/L; Male 21-50 ng/L:  Consistent with possible cardiac damage and possible increased clinical   risk. Serial measurements may help to assess extent of myocardial damage.   .  >50 ng/L: Consistent with cardiac damage, increased clinical risk and  myocardial infarction. Serial measurements may help assess extent of   myocardial damage.   .   NOTE: Children less than 1 year old may have higher  baseline troponin   levels and results should be interpreted in conjunction with the overall   clinical context.   .  NOTE: Troponin I testing is performed using a different   testing methodology at [facility] than at other   system hospitals. Direct result comparisons should only   be made within the same method.     12/14/2022 10:22 PM 32 (H) 0 - 13 ng/L Final     Comment:     .  Less than 99th percentile of normal range cutoff-  Female and children under 18 years old <14 ng/L; Male <21 ng/L: Negative  Repeat testing should be performed if clinically indicated.   .  Female and children under 18 years old 14-50 ng/L; Male 21-50 ng/L:  Consistent with possible cardiac damage and possible increased clinical   risk. Serial measurements may help to assess extent of myocardial damage.   .  >50 ng/L: Consistent with cardiac damage, increased clinical risk and  myocardial infarction. Serial measurements may help assess extent of   myocardial damage.   .   NOTE: Children less than 1 year old may have higher baseline troponin   levels and results should be interpreted in conjunction with the overall   clinical context.   .  NOTE: Troponin I testing is performed using a different   testing methodology at [facility] than at other   [facility]. Direct result comparisons should only   be made within the same method.       BNP   Date/Time Value Ref Range Status   11/08/2021 10:04 PM 95 0 - 99 pg/mL Final     Comment:     .  <100 pg/mL - Heart failure unlikely  100-299 pg/mL - Intermediate probability of acute heart  .               failure exacerbation. Correlate with clinical  .               context and patient history.    >=300 pg/mL - Heart Failure likely. Correlate with clinical  .               context and patient history.  BNP testing is performed using different testing   methodology at [facility] than at other   [facility] hospitals. Direct result comparisons should   only be  "made within the same method.       Hemoglobin A1C   Date/Time Value Ref Range Status   12/28/2022 10:06 AM 5.4 % Final     Comment:          Diagnosis of Diabetes-Adults   Non-Diabetic: < or = 5.6%   Increased risk for developing diabetes: 5.7-6.4%   Diagnostic of diabetes: > or = 6.5%  .       Monitoring of Diabetes                Age (y)     Therapeutic Goal (%)   Adults:          >18           <7.0   Pediatrics:    13-18           <7.5                   7-12           <8.0                   0- 6            7.5-8.5   American Diabetes Association. Diabetes Care 33(S1), Jan 2010.     11/08/2021 10:04 PM 6.2 (A) % Final     Comment:          Diagnosis of Diabetes-Adults   Non-Diabetic: < or = 5.6%   Increased risk for developing diabetes: 5.7-6.4%   Diagnostic of diabetes: > or = 6.5%  .       Monitoring of Diabetes                Age (y)     Therapeutic Goal (%)   Adults:          >18           <7.0   Pediatrics:    13-18           <7.5                   7-12           <8.0                   0- 6            7.5-8.5   American Diabetes Association. Diabetes Care 33(S1), Jan 2010.       VLDL   Date/Time Value Ref Range Status   12/15/2022 03:34 AM 15 0 - 40 mg/dL Final   11/08/2021 10:04 PM 27 0 - 40 mg/dL Final   08/29/2019 10:52 AM SEE COMMENT 0 - 40 mg/dL Final     Comment:       Unable to calculate VLDL.      Last I/O:  No intake/output data recorded.    Past Cardiology Tests (Last 3 Years):  EKG:  ECG 12 lead (Clinic Performed) 11/05/2024      ECG 12 lead 12/11/2023    Echo:  No results found for this or any previous visit from the past 1095 days.    Ejection Fractions:  No results found for: \"EF\"  Cath:  No results found for this or any previous visit from the past 1095 days.    Stress Test:  No results found for this or any previous visit from the past 1095 days.    Cardiac Imaging:  No results found for this or any previous visit from the past 1095 days.      Past Medical History:  She has a past medical " history of Acute upper respiratory infection, unspecified (11/01/2021), Arthritis, Asthma, Chronic bronchitis (Multi), CVA (cerebral vascular accident) (Multi), Dysphagia, Easy bruising, Effusion, unspecified knee (07/02/2021), Encounter for screening for malignant neoplasm of colon (05/06/2015), GERD (gastroesophageal reflux disease), Hypertension, Joint pain, Pain in left knee (07/06/2021), Pain in left knee (09/07/2017), Pain in left lower leg (07/02/2021), Pain in right finger(s), Pancreatitis ([facility]), Personal history of other diseases of the nervous system and sense organs (06/28/2016), Personal history of other diseases of the nervous system and sense organs, Personal history of other diseases of the respiratory system (02/10/2021), Personal history of other endocrine, nutritional and metabolic disease, Personal history of other infectious and parasitic diseases, Personal history of other infectious and parasitic diseases, Personal history of other infectious and parasitic diseases (07/13/2021), Personal history of other infectious and parasitic diseases (11/04/2021), Personal history of other medical treatment, Personal history of other medical treatment, Personal history of other specified conditions (07/19/2021), Pneumonia, Right upper quadrant pain (07/29/2015), Segmental and somatic dysfunction of cervical region (12/12/2015), Shortness of breath, Strain of other muscle(s) and tendon(s) at lower leg level, left leg, initial encounter (07/02/2021), Strain of unspecified muscle(s) and tendon(s) at lower leg level, left leg, initial encounter (07/02/2021), Unilateral primary osteoarthritis, left knee (10/20/2016), Unspecified acute conjunctivitis, bilateral (07/12/2017), Unspecified tear of unspecified meniscus, current injury, left knee, initial encounter (09/07/2017), Urinary tract infection, site not specified (08/16/2021), and Vision loss.    Past Surgical History:  She has a past surgical history  that includes  section, classic (2015); Endometrial ablation (2015); Belt abdominoplasty (2015); Other surgical history (2015); Appendectomy (2015); Other surgical history (2021); Other surgical history (2022); CT angio head w and wo IV contrast (2021); and CT angio neck (2021).      Social History:  She reports that she has never smoked. She has never used smokeless tobacco. She reports that she does not currently use alcohol. She reports that she does not use drugs.    Family History:  Family History   Problem Relation Name Age of Onset    Anxiety disorder Mother      Arthritis Mother      Arthritis Father          seasonal    Alcohol abuse Father          severe    Yun's esophagus Father      Breast cancer Sister          Allergies:  Patient has no known allergies.    Inpatient Medications:  Scheduled medications   Medication Dose Route Frequency    acetaminophen  650 mg oral q6h REINA    [START ON 2025] apixaban  2.5 mg oral q12h REINA    aprepitant        azelastine  2 spray Each Nostril BID    ceFAZolin  2 g intravenous q8h    metoprolol succinate XL  50 mg oral Daily    pantoprazole  40 mg oral BID AC    polyethylene glycol  17 g oral Daily    scopolamine        sertraline  150 mg oral Daily     PRN medications   Medication    aprepitant    ondansetron    Or    ondansetron    scopolamine     Continuous Medications   Medication Dose Last Rate    lactated Ringer's  75 mL/hr       Outpatient Medications:  Current Outpatient Medications   Medication Instructions    albuterol 90 mcg/actuation inhaler Inhale 2 puffs every 4 hours if needed.    aspirin 81 mg chewable tablet Chew 1 tablet (81 mg) once daily.    azelastine (Astelin) 137 mcg (0.1 %) nasal spray 2 sprays, Each Nostril, 2 times daily    b complex 0.4 mg tablet 1 tablet, Daily    collagen-biotin-ascorbic acid (Collagen 1500 Plus C) 500 mg-800 mcg- 50 mg capsule 1 capsule, Daily     cyanocobalamin, vitamin B-12, 1,000 mcg lozenge 1 lozenge po qday    hydrOXYzine HCL (ATARAX) 25 mg, oral, Nightly    magnesium oxide (MAG-OX) 400 mg, Daily    metoprolol succinate XL (TOPROL-XL) 50 mg, oral, Daily, Do not crush or chew.    omeprazole (PRILOSEC) 40 mg, oral, 2 times daily before meals, Do not crush or chew.    sertraline (ZOLOFT) 150 mg, oral, Daily       Physical Exam:  General: No acute distress,  A&O x3  Skin: Warm and dry  Neck: JVD is not elevated  ENT: Moist mucous membranes no lesions appreciated  Pulmonary: CTAB  Cards: Regular rate rhythm, no murmurs gallops or rubs appreciated normal S1-S2  Abdomen: Soft nontender nondistended  Extremities: No edema or cyanosis  Psych: Appropriate mood and affect        Assessment/Plan     1.  Status post left hip replacement: Doing well.  No complications.    2.  Hypertension: Reasonably controlled.  Continue oral metoprolol 50 mg once a day.    3.  History of CVA status post tPA    4.  History of PFO closure    Thank you for the consult.  Feel free to contact us if you have additional concerns or questions.    (This note was generated with voice recognition software and may contain errors including spelling, grammar, syntax and missed recognition of what was dictated, of which may not have been fully corrected)     Code Status:  Full Code    Cheikh Jones MD PhD

## 2025-02-11 NOTE — PROGRESS NOTES
Occupational Therapy    Evaluation    Patient Name: Nicolasa Burger  MRN: [id number]  Today's Date: 2/11/2025  Time Calculation  Start Time: 1336  Stop Time: 1405  Time Calculation (min): 29 min  213/213-A    Assessment  IP OT Assessment  OT Assessment: Patient would benefit from further OT to address ADL's and functional transfers/mobility since recovering from 2/11/2025 left THR anterior approach.  HPI:  2018 left TKR.  Prognosis: Good  End of Session Communication: Bedside nurse  End of Session Patient Position: Bed, 3 rail up, Alarm off, not on at start of session; call-light within reach    Plan:  Treatment Interventions: ADL retraining, Functional transfer training, Patient/family training, Equipment evaluation/education, Endurance training, Compensatory technique education (anterior THR precaution training)  OT Frequency: BID (as needed)  OT Discharge Recommendations: Low intensity level of continued care  OT - OK to Discharge: Yes to next level of care when medically cleared by physician/medical team    Subjective   Current Problem:  1. Preop testing  chlorhexidine (Hibiclens) 4 % external liquid      2. Primary osteoarthritis of left hip  Surgical Pathology Exam    Surgical Pathology Exam        General:  General  Reason for Referral: recent surgery  Referred By: Neo Brooke MD  Past Medical History Relevant to Rehab: CVA (11/2021), asthma, HTN, GERD, chronic pain  Co-Treatment: PT Co-eval to maximize safety during mobility tasks  Prior to Session Communication: Bedside nurse who confirmed that patient is medically stable to participate in this OT session  Patient Position Received: Bed, 2 rail up, Alarm off, not on at start of session  General Comment: Patient seen in room; cooperative, motivated    Precautions:  LE Weight Bearing Status: Weight Bearing as Tolerated  Post-Surgical Precautions: Left hip precautions (anterior approach)  Precautions Comment: UE IV    Pain:  Pain Assessment  Pain Assessment:  0-10  0-10 (Numeric) Pain Score: 7  Pain Type: Surgical pain  Pain Location: Hip  Pain Orientation: Left  Pain Interventions: Repositioned (informed nursing staff for pain medication request)    Objective   Cognition:  Overall Cognitive Status: Within Functional Limits    Home Living:  Type of Home: House (ranch)  Lives With: Spouse (also 19 y.o. daughter)  Home Adaptive Equipment: Walker rolling or standard  Home Layout: One level, Laundry main level  Home Access: Stairs to enter with rails (2)  Bathroom Shower/Tub: Walk-in shower (bathroom recently remodeled)  Bathroom Toilet:  (higher height with vanity; patient will follow-up in obtaining 3-in-1 commode as used in previous TKR)  Bathroom Equipment: Grab bars in shower, Built-in shower seat, Hand-held shower hose  Home Living Comments: use of regular bed; one fall at least in 6 months     Prior Function:  Level of [place]: Independent with ADLs and functional transfers, Independent with homemaking with ambulation  Hand Dominance: Right  Prior Function Comments: drives, shops    ADL:  LE Dressing Assistance: Maximal (due to pain)  Toileting Assistance with Device: Stand by (anterior perineal care)  Toileting Deficit: Bedside commode  ADL Comments: to further address lower body self-care using assisitive techniques/equipment as needed while adhering to post-op hip precaution    Bed Mobility/Transfers:   Bed Mobility  Bed Mobility: Yes  Bed Mobility 1  Bed Mobility 1: Supine to sitting, Sitting to supine  Level of Assistance 1: Moderate assistance, +2, Moderate verbal cues  Bed Mobility Comments 1: HOB elevated supine to sit  Transfers  Transfer: Yes  Transfer 1  Transfer From 1: Sit to, Stand to  Transfer to 1: Bed (3-in-1 commode per patient request to use)  Transfer Device 1: Walker, Gait belt  Transfer Level of Assistance 1: Moderate assistance, +2, Maximum verbal cues    Ambulation/Gait Training:  Functional Mobility  Functional Mobility Performed:  Yes  Functional Mobility 1  Device 1: Rolling walker  Functional Mobility Support Devices: Gait belt  Assistance 1: Moderate assistance, Maximum verbal cues (of 2 staff)    Sitting Balance:  Static Sitting Balance  Static Sitting-Level of Assistance: Contact guard (SBA)    Standing Balance:  Static Standing Balance  Static Standing-Level of Assistance: Moderate assistance (of 2)    Sensation:  Sensation Comment: chronic intermittent numbness/tingling in hands    Extremities: RUE   RUE : Within Functional Limits (grossly observed) and LUE   LUE: Within Functional Limits (grossly observed)    Outcome Measures: [facility] Daily Activity  Putting on and taking off regular lower body clothing: A lot  Bathing (including washing, rinsing, drying): A lot  Putting on and taking off regular upper body clothing: A little  Toileting, which includes using toilet, bedpan or urinal: A lot  Taking care of personal grooming such as brushing teeth: None  Eating Meals: None  Daily Activity - Total Score: 17     EDUCATION:  Education Documentation  Precautions, taught by Ilan Kelly OT at 2/11/2025  2:23 PM.  Learner: Patient  Readiness: Acceptance  Method: Explanation  Response: Demonstrated Understanding, Needs Reinforcement    Mobility Training, taught by Ilan Kelly OT at 2/11/2025  2:23 PM.  Learner: Patient  Readiness: Acceptance  Method: Explanation  Response: Demonstrated Understanding, Needs Reinforcement    Goals:   Encounter Problems       Encounter Problems (Active)       OT Goals       Patient will complete lower body bathing/dressing; toileting with minimal assist using assistive techniques/adaptive equipment as needed  (Progressing)       Start:  02/11/25    Expected End:  02/13/25            Patient will perform functional transfers with supervision:  bed, chair, commode using DME as needed and simulated car transfer with minimal assist  (Progressing)       Start:  02/11/25    Expected End:  02/13/25             Patient will tolerate standing for 5 mins. and show overall good (-) standing balance during ADL's and functional transfers/mobility  (Progressing)       Start:  02/11/25    Expected End:  02/13/25            adhere to anterior THR precautions during ADL's and functional transfers  (Progressing)       Start:  02/11/25    Expected End:  02/13/25

## 2025-02-11 NOTE — ANESTHESIA POSTPROCEDURE EVALUATION
Patient: Nicolasa Burger    Procedure Summary       Date: 02/11/25 Room / Location: [facility] OR 06 / Virtual [facility] OR    Anesthesia Start: 0729 Anesthesia Stop: 0947    Procedure: LEFT TOTAL HIP REPLACEMENT ANTERIOR APPROACH WITH C-ARM (Left: Hip) Diagnosis:       Primary osteoarthritis of left hip      (LEFT HIP ARTHRITIS)    Surgeons: Neo Brooke MD Responsible Provider: Woo Figueroa MD    Anesthesia Type: general ASA Status: 2            Anesthesia Type: general    Vitals Value Taken Time   /79 02/11/25 0945   Temp 37.0 02/11/25 0947   Pulse 78 02/11/25 0946   Resp 16 02/11/25 0947   SpO2 100 % 02/11/25 0946   Vitals shown include unfiled device data.    Anesthesia Post Evaluation    Patient location during evaluation: PACU  Patient participation: complete - patient participated  Level of consciousness: awake and alert  Pain management: adequate  Airway patency: patent  Cardiovascular status: acceptable and hemodynamically stable  Respiratory status: acceptable, face mask, spontaneous ventilation and nonlabored ventilation  Hydration status: acceptable  Postoperative Nausea and Vomiting: none        There were no known notable events for this encounter.

## 2025-02-12 ENCOUNTER — PHARMACY VISIT (OUTPATIENT)
Dept: PHARMACY | Facility: CLINIC | Age: 60
End: 2025-02-12
Payer: COMMERCIAL

## 2025-02-12 ENCOUNTER — TELEPHONE (OUTPATIENT)
Dept: PRIMARY CARE | Facility: CLINIC | Age: 60
End: 2025-02-12

## 2025-02-12 VITALS
SYSTOLIC BLOOD PRESSURE: 128 MMHG | RESPIRATION RATE: 18 BRPM | BODY MASS INDEX: 22.96 KG/M2 | TEMPERATURE: 99.3 F | OXYGEN SATURATION: 98 % | HEIGHT: 66 IN | WEIGHT: 142.86 LBS | HEART RATE: 66 BPM | DIASTOLIC BLOOD PRESSURE: 81 MMHG

## 2025-02-12 LAB
ERYTHROCYTE [DISTWIDTH] IN BLOOD BY AUTOMATED COUNT: 12.2 % (ref 11.5–14.5)
HCT VFR BLD AUTO: 33.8 % (ref 36–46)
HGB BLD-MCNC: 10.8 G/DL (ref 12–16)
HOLD SPECIMEN: NORMAL
MCH RBC QN AUTO: 30.7 PG (ref 26–34)
MCHC RBC AUTO-ENTMCNC: 32 G/DL (ref 32–36)
MCV RBC AUTO: 96 FL (ref 80–100)
NRBC BLD-RTO: 0 /100 WBCS (ref 0–0)
PLATELET # BLD AUTO: 391 X10*3/UL (ref 150–450)
RBC # BLD AUTO: 3.52 X10*6/UL (ref 4–5.2)
WBC # BLD AUTO: 9.5 X10*3/UL (ref 4.4–11.3)

## 2025-02-12 PROCEDURE — 97110 THERAPEUTIC EXERCISES: CPT | Mod: CQ,GP

## 2025-02-12 PROCEDURE — 7100000011 HC EXTENDED STAY RECOVERY HOURLY - NURSING UNIT

## 2025-02-12 PROCEDURE — 2500000001 HC RX 250 WO HCPCS SELF ADMINISTERED DRUGS (ALT 637 FOR MEDICARE OP): Performed by: ORTHOPAEDIC SURGERY

## 2025-02-12 PROCEDURE — 97535 SELF CARE MNGMENT TRAINING: CPT | Mod: CO,GO

## 2025-02-12 PROCEDURE — 2500000001 HC RX 250 WO HCPCS SELF ADMINISTERED DRUGS (ALT 637 FOR MEDICARE OP): Performed by: CLINICAL NURSE SPECIALIST

## 2025-02-12 PROCEDURE — 85027 COMPLETE CBC AUTOMATED: CPT | Performed by: ORTHOPAEDIC SURGERY

## 2025-02-12 PROCEDURE — 2500000002 HC RX 250 W HCPCS SELF ADMINISTERED DRUGS (ALT 637 FOR MEDICARE OP, ALT 636 FOR OP/ED): Performed by: ORTHOPAEDIC SURGERY

## 2025-02-12 PROCEDURE — 2500000004 HC RX 250 GENERAL PHARMACY W/ HCPCS (ALT 636 FOR OP/ED): Performed by: ORTHOPAEDIC SURGERY

## 2025-02-12 PROCEDURE — 2500000004 HC RX 250 GENERAL PHARMACY W/ HCPCS (ALT 636 FOR OP/ED): Performed by: CLINICAL NURSE SPECIALIST

## 2025-02-12 PROCEDURE — 97116 GAIT TRAINING THERAPY: CPT | Mod: CQ,GP

## 2025-02-12 PROCEDURE — RXMED WILLOW AMBULATORY MEDICATION CHARGE

## 2025-02-12 PROCEDURE — 36415 COLL VENOUS BLD VENIPUNCTURE: CPT | Performed by: ORTHOPAEDIC SURGERY

## 2025-02-12 PROCEDURE — 99222 1ST HOSP IP/OBS MODERATE 55: CPT | Performed by: STUDENT IN AN ORGANIZED HEALTH CARE EDUCATION/TRAINING PROGRAM

## 2025-02-12 PROCEDURE — 99238 HOSP IP/OBS DSCHRG MGMT 30/<: CPT | Performed by: CLINICAL NURSE SPECIALIST

## 2025-02-12 PROCEDURE — 97535 SELF CARE MNGMENT TRAINING: CPT | Mod: CQ,GP

## 2025-02-12 RX ORDER — ACETAMINOPHEN 325 MG/1
1000 TABLET ORAL 3 TIMES DAILY
Start: 2025-02-12

## 2025-02-12 RX ORDER — POLYETHYLENE GLYCOL 3350 17 G/17G
17 POWDER, FOR SOLUTION ORAL DAILY PRN
Start: 2025-02-12

## 2025-02-12 RX ORDER — CYCLOBENZAPRINE HCL 5 MG
5 TABLET ORAL 3 TIMES DAILY PRN
Qty: 15 TABLET | Refills: 0 | Status: SHIPPED | OUTPATIENT
Start: 2025-02-12 | End: 2025-02-17

## 2025-02-12 RX ORDER — OXYCODONE HYDROCHLORIDE 5 MG/1
5-10 TABLET ORAL EVERY 6 HOURS PRN
Qty: 40 TABLET | Refills: 0 | Status: SHIPPED | OUTPATIENT
Start: 2025-02-12 | End: 2025-02-19

## 2025-02-12 RX ORDER — CYCLOBENZAPRINE HCL 10 MG
5 TABLET ORAL 3 TIMES DAILY PRN
Status: DISCONTINUED | OUTPATIENT
Start: 2025-02-12 | End: 2025-02-12 | Stop reason: HOSPADM

## 2025-02-12 RX ADMIN — TRAMADOL HYDROCHLORIDE 50 MG: 50 TABLET, COATED ORAL at 06:30

## 2025-02-12 RX ADMIN — OXYCODONE HYDROCHLORIDE 10 MG: 5 TABLET ORAL at 03:31

## 2025-02-12 RX ADMIN — ACETAMINOPHEN 975 MG: 325 TABLET, FILM COATED ORAL at 06:30

## 2025-02-12 RX ADMIN — POLYETHYLENE GLYCOL 3350 17 G: 17 POWDER, FOR SOLUTION ORAL at 08:57

## 2025-02-12 RX ADMIN — TRAMADOL HYDROCHLORIDE 50 MG: 50 TABLET, COATED ORAL at 12:28

## 2025-02-12 RX ADMIN — SERTRALINE HYDROCHLORIDE 150 MG: 50 TABLET ORAL at 08:55

## 2025-02-12 RX ADMIN — PANTOPRAZOLE SODIUM 40 MG: 40 TABLET, DELAYED RELEASE ORAL at 06:30

## 2025-02-12 RX ADMIN — OXYCODONE HYDROCHLORIDE 10 MG: 5 TABLET ORAL at 09:35

## 2025-02-12 RX ADMIN — CYCLOBENZAPRINE 5 MG: 10 TABLET, FILM COATED ORAL at 08:55

## 2025-02-12 RX ADMIN — ACETAMINOPHEN 975 MG: 325 TABLET, FILM COATED ORAL at 11:49

## 2025-02-12 RX ADMIN — KETOROLAC TROMETHAMINE 15 MG: 30 INJECTION, SOLUTION INTRAMUSCULAR at 06:30

## 2025-02-12 RX ADMIN — APIXABAN 2.5 MG: 2.5 TABLET, FILM COATED ORAL at 08:55

## 2025-02-12 ASSESSMENT — PAIN SCALES - GENERAL
PAINLEVEL_OUTOF10: 7
PAINLEVEL_OUTOF10: 7
PAINLEVEL_OUTOF10: 0 - NO PAIN
PAINLEVEL_OUTOF10: 3
PAINLEVEL_OUTOF10: 6
PAINLEVEL_OUTOF10: 0 - NO PAIN
PAINLEVEL_OUTOF10: 7

## 2025-02-12 ASSESSMENT — PAIN - FUNCTIONAL ASSESSMENT
PAIN_FUNCTIONAL_ASSESSMENT: 0-10

## 2025-02-12 ASSESSMENT — COGNITIVE AND FUNCTIONAL STATUS - GENERAL
MOBILITY SCORE: 18
TOILETING: A LITTLE
PERSONAL GROOMING: A LITTLE
DAILY ACTIVITIY SCORE: 18
DRESSING REGULAR LOWER BODY CLOTHING: A LOT
MOVING TO AND FROM BED TO CHAIR: A LITTLE
DRESSING REGULAR UPPER BODY CLOTHING: A LITTLE
TURNING FROM BACK TO SIDE WHILE IN FLAT BAD: A LITTLE
HELP NEEDED FOR BATHING: A LITTLE
CLIMB 3 TO 5 STEPS WITH RAILING: A LITTLE
WALKING IN HOSPITAL ROOM: A LITTLE
MOVING FROM LYING ON BACK TO SITTING ON SIDE OF FLAT BED WITH BEDRAILS: A LITTLE
STANDING UP FROM CHAIR USING ARMS: A LITTLE

## 2025-02-12 ASSESSMENT — PAIN SCALES - PAIN ASSESSMENT IN ADVANCED DEMENTIA (PAINAD): TOTALSCORE: MEDICATION (SEE MAR);COLD APPLIED

## 2025-02-12 ASSESSMENT — PAIN DESCRIPTION - LOCATION: LOCATION: HIP

## 2025-02-12 ASSESSMENT — PAIN DESCRIPTION - ORIENTATION: ORIENTATION: LEFT

## 2025-02-12 ASSESSMENT — PAIN DESCRIPTION - DESCRIPTORS
DESCRIPTORS: ACHING

## 2025-02-12 ASSESSMENT — ACTIVITIES OF DAILY LIVING (ADL): HOME_MANAGEMENT_TIME_ENTRY: 38

## 2025-02-12 NOTE — CARE PLAN
Problem: Skin  Goal: Decreased wound size/increased tissue granulation at next dressing change  2/12/2025 1243 by Brandie Kumar RN  Outcome: Met  2/12/2025 0957 by Brandie Kumar RN  Outcome: Progressing  Goal: Participates in plan/prevention/treatment measures  2/12/2025 1243 by Brandie Kumar RN  Outcome: Met  2/12/2025 0957 by Brandie Kumar RN  Outcome: Progressing  Goal: Prevent/manage excess moisture  2/12/2025 1243 by Brandie Kumar RN  Outcome: Met  2/12/2025 0957 by Brandie Kumar RN  Outcome: Progressing  Goal: Prevent/minimize sheer/friction injuries  2/12/2025 1243 by Brandie Kumar RN  Outcome: Met  2/12/2025 0957 by Brandie Kumar RN  Outcome: Progressing  Goal: Promote/optimize nutrition  2/12/2025 1243 by Brandie Kumar RN  Outcome: Met  2/12/2025 0957 by Brandie Kumar RN  Outcome: Progressing  Goal: Promote skin healing  2/12/2025 1243 by Brandie Kumar RN  Outcome: Met  2/12/2025 0957 by Brandie Kumar RN  Outcome: Progressing     Problem: Pain - Adult  Goal: Verbalizes/displays adequate comfort level or baseline comfort level  2/12/2025 1243 by Brandie Kumar RN  Outcome: Met  2/12/2025 0957 by Brandie Kumar RN  Outcome: Progressing     Problem: Safety - Adult  Goal: Free from fall injury  2/12/2025 1243 by Brandie Kumar RN  Outcome: Met  2/12/2025 0957 by Brandie Kumar RN  Outcome: Progressing     Problem: Discharge Planning  Goal: Discharge to home or other facility with appropriate resources  2/12/2025 1243 by Brandie Kumar RN  Outcome: Met  2/12/2025 0957 by Brandie Kumar RN  Outcome: Progressing     Problem: Chronic Conditions and Co-morbidities  Goal: Patient's chronic conditions and co-morbidity symptoms are monitored and maintained or improved  2/12/2025 1243 by Brandie Kumar RN  Outcome: Met  2/12/2025 0957 by Brandie Kumar RN  Outcome: Progressing     Problem: Nutrition  Goal: Nutrient intake appropriate for maintaining nutritional needs  2/12/2025 1243 by Brandie Kumar  RN  Outcome: Met  2/12/2025 0957 by Brandie Kumar RN  Outcome: Progressing     Problem: Pain  Goal: Takes deep breaths with improved pain control throughout the shift  2/12/2025 1243 by Brandie Kumar RN  Outcome: Met  2/12/2025 0957 by Brandie Kumar RN  Outcome: Progressing  Goal: Turns in bed with improved pain control throughout the shift  2/12/2025 1243 by Brandie Kumar RN  Outcome: Met  2/12/2025 0957 by Brandie Kumar RN  Outcome: Progressing  Goal: Walks with improved pain control throughout the shift  2/12/2025 1243 by Brandie Kumar RN  Outcome: Met  2/12/2025 0957 by Brandie Kumar RN  Outcome: Progressing  Goal: Performs ADL's with improved pain control throughout shift  2/12/2025 1243 by Brandie Kumar RN  Outcome: Met  2/12/2025 0957 by Brandie Kumar RN  Outcome: Progressing  Goal: Participates in PT with improved pain control throughout the shift  2/12/2025 1243 by Brandie Kumar RN  Outcome: Met  2/12/2025 0957 by Brandie Kumar RN  Outcome: Progressing  Goal: Free from opioid side effects throughout the shift  2/12/2025 1243 by Brandie Kumar RN  Outcome: Met  2/12/2025 0957 by Brandie Kumar RN  Outcome: Progressing  Goal: Free from acute confusion related to pain meds throughout the shift  2/12/2025 1243 by Brandie Kumar RN  Outcome: Met  2/12/2025 0957 by Brandie Kumar RN  Outcome: Progressing     Problem: Fall/Injury  Goal: Not fall by end of shift  2/12/2025 1243 by Brandie Kumar RN  Outcome: Met  2/12/2025 0957 by Brandie Kumar RN  Outcome: Progressing  Goal: Be free from injury by end of the shift  2/12/2025 1243 by Brandie Kumar RN  Outcome: Met  2/12/2025 0957 by Brandie Kumar RN  Outcome: Progressing  Goal: Verbalize understanding of personal risk factors for fall in the hospital  2/12/2025 1243 by Brandie Kumar RN  Outcome: Met  2/12/2025 0957 by Brandie Kumar RN  Outcome: Progressing  Goal: Verbalize understanding of risk factor reduction measures to prevent injury from fall  in the home  2/12/2025 1243 by Brandie Kumar RN  Outcome: Met  2/12/2025 0957 by Brandie Kumar RN  Outcome: Progressing  Goal: Use assistive devices by end of the shift  2/12/2025 1243 by Brandie Kumar RN  Outcome: Met  2/12/2025 0957 by Brandie Kumar RN  Outcome: Progressing  Goal: Pace activities to prevent fatigue by end of the shift  2/12/2025 1243 by Brandie Kumar RN  Outcome: Met  2/12/2025 0957 by Brandie Kumar RN  Outcome: Progressing

## 2025-02-12 NOTE — CONSULTS
Reason For Consult  mm    History Of Present Illness  Nicolasa Burger is a 59 y.o. female presenting with female past reasonable for hypertension, hyperlipidemia, CVA status post tPA, PFO s/p closure 2022, Chronic osteoarthritis of the left hip presents for direct hospital admission for left hip replacement.  Patient typically follows with Dr. Atkins as an outpatient.  Patient occasionally has palpitations and has been oral beta-blockers for control of symptoms.  Today she denies chest discomfort.  Patient underwent surgery without complication. Current vital signs blood pressure 147/67 with a heart rate of 72.   .     Past Medical History  She has a past medical history of Acute upper respiratory infection, unspecified (11/01/2021), Arthritis, Asthma, Chronic bronchitis (Multi), CVA (cerebral vascular accident) (Multi), Dysphagia, Easy bruising, Effusion, unspecified knee (07/02/2021), Encounter for screening for malignant neoplasm of colon (05/06/2015), GERD (gastroesophageal reflux disease), Hypertension, Joint pain, Pain in left knee (07/06/2021), Pain in left knee (09/07/2017), Pain in left lower leg (07/02/2021), Pain in right finger(s), Pancreatitis ([facility]), Personal history of other diseases of the nervous system and sense organs (06/28/2016), Personal history of other diseases of the nervous system and sense organs, Personal history of other diseases of the respiratory system (02/10/2021), Personal history of other endocrine, nutritional and metabolic disease, Personal history of other infectious and parasitic diseases, Personal history of other infectious and parasitic diseases, Personal history of other infectious and parasitic diseases (07/13/2021), Personal history of other infectious and parasitic diseases (11/04/2021), Personal history of other medical treatment, Personal history of other medical treatment, Personal history of other specified conditions (07/19/2021), Pneumonia, Right upper  quadrant pain (2015), Segmental and somatic dysfunction of cervical region (2015), Shortness of breath, Strain of other muscle(s) and tendon(s) at lower leg level, left leg, initial encounter (2021), Strain of unspecified muscle(s) and tendon(s) at lower leg level, left leg, initial encounter (2021), Unilateral primary osteoarthritis, left knee (10/20/2016), Unspecified acute conjunctivitis, bilateral (2017), Unspecified tear of unspecified meniscus, current injury, left knee, initial encounter (2017), Urinary tract infection, site not specified (2021), and Vision loss.    Surgical History  She has a past surgical history that includes  section, classic (2015); Endometrial ablation (2015); Belt abdominoplasty (2015); Other surgical history (2015); Appendectomy (2015); Other surgical history (2021); Other surgical history (2022); CT angio head w and wo IV contrast (2021); and CT angio neck (2021).     Social History  She reports that she has never smoked. She has never used smokeless tobacco. She reports that she does not currently use alcohol. She reports that she does not use drugs.    Family History  Family History   Problem Relation Name Age of Onset    Anxiety disorder Mother      Arthritis Mother      Arthritis Father          seasonal    Alcohol abuse Father          severe    Yun's esophagus Father      Breast cancer Sister          Allergies  Patient has no known allergies.    Review of Systems  10 ros negative except  per above     Physical Exam  General: No acute distress,  A&O x3  Skin: Warm and dry  Neck: JVD is not elevated  ENT: Moist mucous membranes no lesions appreciated  Pulmonary: CTAB  Cards: Regular rate rhythm, no murmurs gallops or rubs appreciated normal S1-S2  Abdomen: Soft nontender nondistended  Extremities: No edema or cyanosis  Psych: Appropriate mood and affe     Last Recorded  "Vitals  Blood pressure 128/81, pulse 66, temperature 37.4 °C (99.3 °F), temperature source Temporal, resp. rate 18, height 1.676 m (5' 6\"), weight 64.8 kg (142 lb 13.7 oz), SpO2 98%.    Relevant Results       Assessment/Plan     #Left hip oa  #HNT  #CVA    - overall doing well  - in some pain  - no issues otherwise  - pt/ot  - labs and vtials reviewed    I spent 55 minutes in the professional and overall care of this patient.      Harvinder Hussein, DO    "

## 2025-02-12 NOTE — TELEPHONE ENCOUNTER
Edwardo Martinez with answering service about pt hip replacement yesterday   That's all the msg said  [phone]

## 2025-02-12 NOTE — DISCHARGE INSTRUCTIONS
PAIN MANAGEMENT AT HOME:  To provide the best pain control over the next few days when pain will be at it's worst, we suggest you continue to take the following medications routinely and separately to provide the best pain management.  In addition, apply ice VERY FREQUENTLY to incision.   Also use some type of alternative intervention such as music therapy, relaxation techniques, or an type of diversion (such as watching television or talking to a friend) to help control pain.            DO NOT TAKE OXYCODONE AND FLEXERIL AT SAME TIME!!!!    Also, do NOT take more than 4000mg of acetaminophen (tylenol) in 24 hours.        CONSTIPATION MANAGEMENT AT HOME:  Constipation is common after Joint Replacement surgery for several reasons.  A common side effect of all pain medication is constipation.  A decrease in your activity as well as change in your normal diet & appetite all contribute to the constipation.  At home, it is important to take a daily stool softener such as Colace, Milk of magnesium or senokot while you are taking pain medications to help manage the constipation.  In addition, if you do NOT have a bowel movement within 72 hours of discharge, add a laxative such as miralax.  Miralax normally takes 2 to 3 days to work so you will not receive immediate results.  It is also important to drink plenty of fluids, especially water.  Stool softeners & laxatives need water to work properly.  We also suggest you increase your fiber intake either with foods high in fiber or with some type of supplement such as benefiber or metamucil.    Foods that are high in fiber include:     Fruits such as pears, strawberries, avocado, apples, raspberries, bananas, kiwis   Vegetables such as carrots, beets, broccoli, brussel sprouts, spinach   Lentils and kidney beans   Split peas and chickpeas   Oats, almonds, oswaldo seeds, and sweet potatoes      ACTIVITY AT HOME:  You will need to continue with your therapy after discharge either  with in home therapy or at an outpatient facility.  Most patients initially do in home therapy for about two weeks then transition to outpatient therapy.  You have freedom of choice in which in home therapy and outpatient facility you plan to use.  Most in home therapy sessions will begin within 24 to 48 hours after discharge.  After about two weeks of in home therapy, you will most likely to ready for outpatient therapy sessions.  Outpatient therapy is normally twice a week for weeks.  While you are at home it is important that you perform the exercises the therapist went over with you in the hospital 2 to 3 times a day.  After you complete your exercises, apply ice to your incision to help with swelling and pain.  You should also be getting up and walking around your house every hour with the use of your walker.  While at rest, perform, ankle pumps on each foot at least ten times.  This will help with the swelling as well as decrease your risk for blood clots.  ALWAYS USE YOUR WALKER WITH ANY TYPE OF ACTIVITY!!!!    WOUND CARE:  The bandage on your incision will stay in place for 7 days.  The bandage is waterproof so you may shower with the bandage in place.  No need to wrap or cover it.  Some drainage on your bandage is perfectly normal.  Home health care will assist you with bandage removal.  Once the bandage is removed, your incision can be left open to air if there is no drainage present.  If your incision is draining at the time of bandage removal, home health care will assist you with applying a new gauze bandage.  Gauze bandages are NOT waterproof.    Swelling in your operative extremity will peak about 72 hours after surgery and can last for weeks.  To help with the swelling make sure you are elevating your leg and apply ice frequently.  In addition, you will receive compression stockings upon discharge from the hospital.  Make sure you are wearing these stockings on both your legs at home.  Generally we  instruct you to wear during the day and remove at bedtime for the next 4 weeks.      *Apply ice to incision at least 5 times daily    *Wear bilateral thaddeus hose stockings to lower extremities for next 4 weeks    *Do NOT take any non steroidal anti-inflammatory medications such as motrin, aleve, ibuprofen, celebrex or meloxicam    *Take daily stool softener.  If you experience any diarrhea, stop medication    *If you need a refill on your pain medication, contact your surgeon's office Monday through Thursday with as least 24 hours notice    If you have any questions or concerns please contact the Joint  Yesika Pretty at:  Office: [phone]   Message on your [email or web address] account  Email:  [email or web address]

## 2025-02-12 NOTE — PROGRESS NOTES
Patient did NOT attend pre op educational TJR class and did NOT complete online education.    Discussed discharge plan with patient.  For discharge home today with in home therapy follow up.  Verified with patient she plans to utilize [facility] therapy as arranged by surgeon's office.  Instructed patient to call contact number to arrange appointment for tomorrow.    Reviewed discharge instructions with patient.  Patient verbalizes understanding of activity, wound care, pain management, home going medications and follow up care.  TJR zone form and pain medication administration form provided to patient.

## 2025-02-12 NOTE — PROGRESS NOTES
Physical Therapy    Physical Therapy Treatment    Patient Name: Nicolasa Burger  MRN: [id number]  Department:   Room: [address] [place] [address]  Today's Date: 2/12/2025  Time Calculation  Start Time: 1101  Stop Time: 1131  Time Calculation (min): 30 min         Assessment/Plan         PT Plan  Treatment/Interventions: Bed mobility, Transfer training, Gait training, Stair training, Therapeutic exercise, Therapeutic activity, Home exercise program  PT Plan: Ongoing PT  PT Frequency: BID  PT Discharge Recommendations: Low intensity level of continued care (w/ initial 24hr support for safe transition home postop)  PT Recommended Transfer Status: Assist x2  PT - OK to Discharge: Yes (when cleared by medical team)      General Visit Information:   PT  Visit  PT Received On: 02/12/25       Subjective                Objective   Pain: left hip 6/10                          Treatments:  Therapeutic Exercise  Therapeutic Exercise Performed:  (supine lle ap's, qs,gs,hs,abd,saqs,laqs x 20 reps ea   cold pack to left hip post rx)    Bed Mobility  Bed Mobility:  (supine to sit and sit to supine sba)    Ambulation/Gait Training  Ambulation/Gait Training Performed:  (ambulated 100 feet x 2 using fixed wheeled walker sba) verbal cues for proper gait sequence    Transfers  Transfer:  (sit to stand sba)    Stairs  Stairs:  (ascended/descended 4 steps using 2 rails cga)    Outcome Measures:  [facility] Basic Mobility  Turning from your back to your side while in a flat bed without using bedrails: A little  Moving from lying on your back to sitting on the side of a flat bed without using bedrails: A little  Moving to and from bed to chair (including a wheelchair): A little  Standing up from a chair using your arms (e.g. wheelchair or bedside chair): A little  To walk in hospital room: A little  Climbing 3-5 steps with railing: A little  Basic Mobility - Total Score: 18    Education Documentation  Precautions, taught by Mary Ellen Miller PTA at 2/12/2025 12:59  PM.  Learner: Patient  Readiness: Acceptance  Method: Demonstration  Response: Demonstrated Understanding    Mobility Training, taught by Mary Ellen Miller PTA at 2/12/2025 12:59 PM.  Learner: Patient  Readiness: Acceptance  Method: Demonstration  Response: Demonstrated Understanding    Education Comments  No comments found.             Encounter Problems       Encounter Problems (Active)       PT Problem       STG - Pt will transition supine <> sitting with supervision  (Progressing)       Start:  02/11/25    Expected End:  02/13/25            STG - Pt will transfer STS with sba  (Met)       Start:  02/11/25    Expected End:  02/13/25    Resolved:  02/12/25         STG - Pt will amb >=100' using ww with sba  (Met)       Start:  02/11/25    Expected End:  02/13/25    Resolved:  02/12/25         STG -  Pt will navigate >=3 stairs using rail with cga  (Progressing)       Start:  02/11/25    Expected End:  02/13/25            STG - Pt will perform a postop thr ther ex program of 2-3 sets of 10  (Progressing)       Start:  02/11/25    Expected End:  02/13/25               Pain - Adult

## 2025-02-12 NOTE — PROGRESS NOTES
Occupational Therapy    OT Treatment    Patient Name: Nicolasa Burger  MRN: [id number]  Department:   Room: [address] [place] [address]  Today's Date: 2/12/2025  Time Calculation  Start Time: 0928  Stop Time: 1006  Time Calculation (min): 38 min        Assessment:  End of Session Patient Position: Up in chair, Alarm off, not on at start of session     Plan:  Treatment Interventions: ADL retraining, Functional transfer training, Patient/family training, Equipment evaluation/education, Endurance training, Compensatory technique education (anterior THR precaution training)  OT Frequency: BID (as needed)  OT Discharge Recommendations: Low intensity level of continued care  OT - OK to Discharge: Yes  Treatment Interventions: ADL retraining, Functional transfer training, Patient/family training, Equipment evaluation/education, Endurance training, Compensatory technique education (anterior THR precaution training)    Subjective   Previous Visit Info:  OT Last Visit  OT Received On: 02/12/25  General:  General  Prior to Session Communication: Bedside nurse  Patient Position Received: Up in chair, Alarm off, not on at start of session  General Comment: pleasant and cooperative  Precautions:  LE Weight Bearing Status: Weight Bearing as Tolerated  Medical Precautions: Fall precautions  Post-Surgical Precautions: Left hip precautions (anterior)            Pain:  Pain Assessment  Pain Assessment:  (upon entering room pt requesting pain medication, nursing notified and pain medication given by nurse. ice pack applied at end of therapy session)    Objective      Activities of Daily Living: LE Dressing  LE Dressing: Yes  Pants Level of Assistance: Minimum assistance  Sock Level of Assistance: Maximum assistance  Shoe Level of Assistance: Minimum assistance  Compression Hose Level of Assistance: Maximum assistance. Pt educated on use of compression stockings, importance of compression stockings, donning/doffing, and wear schedule.  Adult Briefs Level of  Assistance: Minimum assistance  LE Dressing Where Assessed: Chair  LE Dressing Comments: pt states spouse or daughter will assist at home when needed  Pt educated on THR precautions as applied to self care tasks and transfers throughout tx session. Pt verbalized and demonstrated understanding.     Functional Standing Tolerance:  Time: 5:00 standing at sink to complete grooming tasks  Bed Mobility/Transfers: Transfers  Transfer: Yes  Transfer 1  Technique 1: Sit to stand, Stand to sit  Transfer Device 1: Walker  Transfer Level of Assistance 1: Contact guard    Car Transfers  Car Transfers Comments: .Pt educated on transferring in/out of car adhering to precautions. Following demonstration from this therapist pt verbalized understanding.         Functional Mobility:  Functional Mobility  Functional Mobility Performed: Yes  Functional Mobility 1  Device 1: Rolling walker  Assistance 1: Close supervision  Comments 1: pt ambulated in/out of bathroom with min vc for sequencing and increased safety      Outcome Measures:[facility] Daily Activity  Putting on and taking off regular lower body clothing: A lot  Bathing (including washing, rinsing, drying): A little  Putting on and taking off regular upper body clothing: A little  Toileting, which includes using toilet, bedpan or urinal: A little  Taking care of personal grooming such as brushing teeth: A little  Eating Meals: None  Daily Activity - Total Score: 18        Education Documentation  Precautions, taught by YADIRA Barrientos at 2/12/2025 12:54 PM.  Learner: Patient  Readiness: Acceptance  Method: Explanation  Response: Verbalizes Understanding    Education Comments  No comments found.               Goals:  Encounter Problems       Encounter Problems (Active)       OT Goals       Patient will complete lower body bathing/dressing; toileting with minimal assist using assistive techniques/adaptive equipment as needed  (Progressing)       Start:  02/11/25    Expected  End:  02/13/25            Patient will perform functional transfers with supervision:  bed, chair, commode using DME as needed and simulated car transfer with minimal assist  (Progressing)       Start:  02/11/25    Expected End:  02/13/25            Patient will tolerate standing for 5 mins. and show overall good (-) standing balance during ADL's and functional transfers/mobility  (Progressing)       Start:  02/11/25    Expected End:  02/13/25            adhere to anterior THR precautions during ADL's and functional transfers  (Progressing)       Start:  02/11/25    Expected End:  02/13/25

## 2025-02-12 NOTE — DISCHARGE SUMMARY
Discharge Diagnosis  Arthritis of left hip    Issues Requiring Follow-Up  None     Test Results Pending At Discharge  Pending Labs       Order Current Status    Surgical Pathology Exam In process            Hospital Course   Patient is a 59 year old female with severe osteoarthritis of left hip.  Prior medical history includes hypertension and CVA.  On 2/11/25, patient underwent uncomplicated left total hip replacement via anterior approach by Dr Neo Brooke.  General anesthesia was utilized and estimated blood loss intraoperatively was 200cc.  Patient received one dose of IV antibiotics preoperatively and 2 additional does were administered post operatively.  Medical physician and patient's cardiologist were placed on consult to help assist with post op medical management.  For DVT prophylaxis, patient was ordered eliquis 2.5mg twice a day and bilateral SCDs.  Physical and occupational therapy were initiated with full weight bearing status.    Patient progressed well with therapy.  Her hemoglobin level was stable at 10.8 and she remained afebrile.  On POD #1, patient was medically stable and safe for discharge home with in home therapy follow up.  At time of discharge, patient was ambulating 100 feet with use of wheeled walker and stand by assistance.  She was also able to demonstrate safe stair climbing with contact guard assist.  Home going script of eliquis 2.5mg twice daily was provided to patient along with bilateral thaddeus hose compression stockings to be worn at home for 4 weeks.  For pain management, oxycodone and flexeril scripts were given.  Anticipate need for extended use of narcotic medications as well as higher MED requirements based on usual post operative incisional pain following total joint replacement surgery.  Patient will follow up in office with Dr Brooke in 2 weeks for staple removal.      Pertinent Physical Exam At Time of Discharge  Physical Exam    Home Medications     Medication List       START taking these medications     acetaminophen 325 mg tablet; Commonly known as: Tylenol; Take 3 tablets   (975 mg) by mouth 3 times a day.   cyclobenzaprine 5 mg tablet; Commonly known as: Flexeril; Take 1 tablet   (5 mg) by mouth 3 times a day as needed for muscle spasms for up to 5   days.   Eliquis 2.5 mg tablet; Generic drug: apixaban; Take 1 tablet (2.5 mg) by   mouth every 12 hours.   oxyCODONE 5 mg immediate release tablet; Commonly known as: Roxicodone;   Take 1-2 tablets (5-10 mg) by mouth every 6 hours if needed for severe   pain (7 - 10) for up to 7 days.   polyethylene glycol 17 gram packet; Commonly known as: Glycolax,   Miralax; Take 17 g by mouth once daily as needed (for constipation).     CONTINUE taking these medications     azelastine 137 mcg (0.1 %) nasal spray; Commonly known as: Astelin;   Administer 2 sprays into each nostril 2 times a day.   b complex 0.4 mg tablet   cyanocobalamin (vitamin B-12) 1,000 mcg lozenge; 1 lozenge po qday   hydrOXYzine HCL 25 mg tablet; Commonly known as: Atarax; Take 1 tablet   (25 mg) by mouth once daily at bedtime.   magnesium oxide 400 mg tablet; Commonly known as: Mag-Ox   metoprolol succinate XL 50 mg 24 hr tablet; Commonly known as:   Toprol-XL; Take 1 tablet (50 mg) by mouth once daily. Do not crush or   chew.   omeprazole 40 mg DR capsule; Commonly known as: PriLOSEC; Take 1 capsule   (40 mg) by mouth 2 times a day before meals. Do not crush or chew.   sertraline 100 mg tablet; Commonly known as: Zoloft; Take 1.5 tablets   (150 mg) by mouth once daily.     STOP taking these medications     albuterol 90 mcg/actuation inhaler   aspirin 81 mg chewable tablet   chlorhexidine 0.12 % solution; Commonly known as: Peridex   chlorhexidine 4 % external liquid; Commonly known as: Hibiclens   Collagen 1500 Plus C 500 mg-800 mcg- 50 mg capsule; Generic drug:   collagen-biotin-ascorbic acid   traMADol 50 mg tablet; Commonly known as: Ultram       Outpatient  Follow-Up  No future appointments.    Yesika Pretty, APRN-CNS

## 2025-02-12 NOTE — CARE PLAN
Problem: Skin  Goal: Decreased wound size/increased tissue granulation at next dressing change  Outcome: Progressing  Flowsheets (Taken 2/12/2025 0028)  Decreased wound size/increased tissue granulation at next dressing change: Promote sleep for wound healing  Goal: Participates in plan/prevention/treatment measures  Outcome: Progressing  Flowsheets (Taken 2/12/2025 0028)  Participates in plan/prevention/treatment measures: Discuss with provider PT/OT consult  Goal: Prevent/manage excess moisture  Outcome: Progressing  Flowsheets (Taken 2/12/2025 0028)  Prevent/manage excess moisture: Monitor for/manage infection if present  Goal: Prevent/minimize sheer/friction injuries  Outcome: Progressing  Flowsheets (Taken 2/12/2025 0028)  Prevent/minimize sheer/friction injuries: HOB 30 degrees or less  Goal: Promote/optimize nutrition  Outcome: Progressing  Flowsheets (Taken 2/12/2025 0028)  Promote/optimize nutrition: Consume > 50% meals/supplements  Goal: Promote skin healing  Outcome: Progressing  Flowsheets (Taken 2/12/2025 0028)  Promote skin healing: Protective dressings over bony prominences     Problem: Pain - Adult  Goal: Verbalizes/displays adequate comfort level or baseline comfort level  Outcome: Progressing     Problem: Safety - Adult  Goal: Free from fall injury  Outcome: Progressing     Problem: Discharge Planning  Goal: Discharge to home or other facility with appropriate resources  Outcome: Progressing     Problem: Chronic Conditions and Co-morbidities  Goal: Patient's chronic conditions and co-morbidity symptoms are monitored and maintained or improved  Outcome: Progressing     Problem: Nutrition  Goal: Nutrient intake appropriate for maintaining nutritional needs  Outcome: Progressing     Problem: Pain  Goal: Takes deep breaths with improved pain control throughout the shift  Outcome: Progressing  Goal: Turns in bed with improved pain control throughout the shift  Outcome: Progressing  Goal: Walks with  improved pain control throughout the shift  Outcome: Progressing  Goal: Performs ADL's with improved pain control throughout shift  Outcome: Progressing  Goal: Participates in PT with improved pain control throughout the shift  Outcome: Progressing  Goal: Free from opioid side effects throughout the shift  Outcome: Progressing  Goal: Free from acute confusion related to pain meds throughout the shift  Outcome: Progressing     Problem: Fall/Injury  Goal: Not fall by end of shift  Outcome: Progressing  Goal: Be free from injury by end of the shift  Outcome: Progressing  Goal: Verbalize understanding of personal risk factors for fall in the hospital  Outcome: Progressing  Goal: Verbalize understanding of risk factor reduction measures to prevent injury from fall in the home  Outcome: Progressing  Goal: Use assistive devices by end of the shift  Outcome: Progressing  Goal: Pace activities to prevent fatigue by end of the shift  Outcome: Progressing

## 2025-02-21 LAB
LABORATORY COMMENT REPORT: NORMAL
PATH REPORT.FINAL DX SPEC: NORMAL
PATH REPORT.GROSS SPEC: NORMAL
PATH REPORT.RELEVANT HX SPEC: NORMAL
PATH REPORT.TOTAL CANCER: NORMAL

## 2025-04-04 ENCOUNTER — OFFICE VISIT (OUTPATIENT)
Dept: URGENT CARE | Age: 60
End: 2025-04-04
Payer: COMMERCIAL

## 2025-04-04 VITALS
RESPIRATION RATE: 16 BRPM | SYSTOLIC BLOOD PRESSURE: 142 MMHG | OXYGEN SATURATION: 97 % | HEART RATE: 106 BPM | TEMPERATURE: 98 F | DIASTOLIC BLOOD PRESSURE: 84 MMHG

## 2025-04-04 DIAGNOSIS — J32.9 SINOBRONCHITIS: Primary | ICD-10-CM

## 2025-04-04 DIAGNOSIS — J40 SINOBRONCHITIS: Primary | ICD-10-CM

## 2025-04-04 PROCEDURE — 1036F TOBACCO NON-USER: CPT | Performed by: STUDENT IN AN ORGANIZED HEALTH CARE EDUCATION/TRAINING PROGRAM

## 2025-04-04 PROCEDURE — 3079F DIAST BP 80-89 MM HG: CPT | Performed by: STUDENT IN AN ORGANIZED HEALTH CARE EDUCATION/TRAINING PROGRAM

## 2025-04-04 PROCEDURE — 3077F SYST BP >= 140 MM HG: CPT | Performed by: STUDENT IN AN ORGANIZED HEALTH CARE EDUCATION/TRAINING PROGRAM

## 2025-04-04 PROCEDURE — 99213 OFFICE O/P EST LOW 20 MIN: CPT | Performed by: STUDENT IN AN ORGANIZED HEALTH CARE EDUCATION/TRAINING PROGRAM

## 2025-04-04 RX ORDER — DOXYCYCLINE 100 MG/1
100 CAPSULE ORAL 2 TIMES DAILY
Qty: 20 CAPSULE | Refills: 0 | Status: SHIPPED | OUTPATIENT
Start: 2025-04-04 | End: 2025-04-14

## 2025-04-04 RX ORDER — METHYLPREDNISOLONE 4 MG/1
TABLET ORAL
Qty: 21 TABLET | Refills: 0 | Status: SHIPPED | OUTPATIENT
Start: 2025-04-04 | End: 2025-04-10

## 2025-04-04 RX ORDER — BENZONATATE 200 MG/1
200 CAPSULE ORAL EVERY 8 HOURS
Qty: 30 CAPSULE | Refills: 0 | Status: SHIPPED | OUTPATIENT
Start: 2025-04-04 | End: 2025-04-14

## 2025-04-04 ASSESSMENT — ENCOUNTER SYMPTOMS
GASTROINTESTINAL NEGATIVE: 1
SINUS PAIN: 1
CARDIOVASCULAR NEGATIVE: 1
FEVER: 0
SINUS COMPLAINT: 1
COUGH: 1
SINUS PRESSURE: 1
RHINORRHEA: 1

## 2025-04-04 ASSESSMENT — PAIN SCALES - GENERAL: PAINLEVEL_OUTOF10: 7

## 2025-04-04 NOTE — PROGRESS NOTES
Subjective   Patient ID: Nicolasa Burger is a 59 y.o. female. They present today with a chief complaint of Sinus Problem (Sinus/chest congestion, cough, HA X 10 days. ).    History of Present Illness    Sinus Problem  Associated symptoms: congestion, cough, ear pain and rhinorrhea    Associated symptoms: no fever      Patient presents to urgent care for a chief complaint of sinus pressure congestion and productive cough over the last 10 days no recently recorded fevers no previous URI no report of respiratory distress    Past Medical History  Allergies as of 04/04/2025    (No Known Allergies)       (Not in a hospital admission)       Past Medical History:   Diagnosis Date    Acute upper respiratory infection, unspecified 11/01/2021    Viral URI with cough    Arthritis     Asthma     Chronic bronchitis (Multi)     CVA (cerebral vascular accident) (Multi)     Dysphagia     Easy bruising     Effusion, unspecified knee 07/02/2021    Suprapatellar effusion of knee    Encounter for screening for malignant neoplasm of colon 05/06/2015    Screening for colon cancer    GERD (gastroesophageal reflux disease)     Hypertension     Joint pain     Pain in left knee 07/06/2021    Acute pain of left knee    Pain in left knee 09/07/2017    Left knee pain    Pain in left lower leg 07/02/2021    Pain of left calf    Pain in right finger(s)     Pain of right thumb    Pancreatitis ([facility]-HCC)     Personal history of other diseases of the nervous system and sense organs 06/28/2016    History of labyrinthitis    Personal history of other diseases of the nervous system and sense organs     History of migraine    Personal history of other diseases of the respiratory system 02/10/2021    History of pharyngitis    Personal history of other endocrine, nutritional and metabolic disease     History of goiter    Personal history of other infectious and parasitic diseases     History of varicella    Personal history of other infectious and parasitic  diseases     History of mumps    Personal history of other infectious and parasitic diseases 2021    History of herpes zoster    Personal history of other infectious and parasitic diseases 2021    History of viral infection    Personal history of other medical treatment     History of echocardiogram    Personal history of other medical treatment     History of cardiac monitoring    Personal history of other specified conditions 2021    History of right flank pain    Pneumonia     Right upper quadrant pain 2015    RUQ abdominal pain    Segmental and somatic dysfunction of cervical region 2015    Segmental and somatic dysfunction of cervical region    Shortness of breath     Strain of other muscle(s) and tendon(s) at lower leg level, left leg, initial encounter 2021    Strain of calf muscle, left, initial encounter    Strain of unspecified muscle(s) and tendon(s) at lower leg level, left leg, initial encounter 2021    Strain of left knee, initial encounter    Unilateral primary osteoarthritis, left knee 10/20/2016    Osteoarthritis of left knee    Unspecified acute conjunctivitis, bilateral 2017    Acute conjunctivitis of both eyes, unspecified acute conjunctivitis type    Unspecified tear of unspecified meniscus, current injury, left knee, initial encounter 2017    Tear of meniscus of left knee    Urinary tract infection, site not specified 2021    Acute lower UTI    Vision loss        Past Surgical History:   Procedure Laterality Date    APPENDECTOMY  2015    Appendectomy    BELT ABDOMINOPLASTY  2015    Abdominoplasty     SECTION, CLASSIC  2015     Section    CT ANGIO NECK  2021    CT NECK ANGIO W AND WO IV CONTRAST 2021 DOCTOR OFFICE LEGACY    CT HEAD ANGIO W AND WO IV CONTRAST  2021    CT HEAD ANGIO W AND WO IV CONTRAST 2021 DOCTOR OFFICE LEGACY    ENDOMETRIAL ABLATION  2015    Gynecologic  Services Thermal Endometrial Ablation    OTHER SURGICAL HISTORY  05/06/2015    Breast Surgery Enlargement Procedure    OTHER SURGICAL HISTORY  12/17/2021    Knee replacement    OTHER SURGICAL HISTORY  06/23/2022    Patent foramen ovale repair        reports that she has never smoked. She has never used smokeless tobacco. She reports that she does not currently use alcohol. She reports that she does not use drugs.    Review of Systems  Review of Systems   Constitutional:  Negative for fever.   HENT:  Positive for congestion, ear pain, postnasal drip, rhinorrhea, sinus pressure and sinus pain. Negative for ear discharge.    Respiratory:  Positive for cough.    Cardiovascular: Negative.    Gastrointestinal: Negative.                                   Objective    Vitals:    04/04/25 1622   BP: 142/84   Pulse: 106   Resp: 16   Temp: 36.7 °C (98 °F)   SpO2: 97%     No LMP recorded. Patient is postmenopausal.    Physical Exam  Vitals and nursing note reviewed.   Constitutional:       General: She is not in acute distress.     Appearance: Normal appearance. She is not ill-appearing, toxic-appearing or diaphoretic.   HENT:      Head: Normocephalic and atraumatic.      Right Ear: Tympanic membrane normal. There is no impacted cerumen.      Left Ear: Tympanic membrane normal. There is no impacted cerumen.      Nose: Congestion present.      Mouth/Throat:      Mouth: Mucous membranes are moist.      Pharynx: Posterior oropharyngeal erythema present.   Cardiovascular:      Rate and Rhythm: Normal rate and regular rhythm.      Pulses: Normal pulses.      Heart sounds: Normal heart sounds.   Pulmonary:      Effort: Pulmonary effort is normal. No respiratory distress.      Breath sounds: No stridor. Rhonchi present. No wheezing or rales.   Lymphadenopathy:      Cervical: No cervical adenopathy.   Neurological:      General: No focal deficit present.      Mental Status: She is alert and oriented to person, place, and time.    Psychiatric:         Mood and Affect: Mood normal.         Behavior: Behavior normal.         Procedures    Point of Care Test & Imaging Results from this visit  No results found for this visit on 04/04/25.   Imaging  No results found.    Cardiology, Vascular, and Other Imaging  No other imaging results found for the past 2 days      Diagnostic study results (if any) were reviewed by Rei Mcnally PA-C.    Assessment/Plan   Allergies, medications, history, and pertinent labs/EKGs/Imaging reviewed by Rei Mcnally PA-C.     Medical Decision Making  I did discuss with patient I believe she has sinobronchitis, patient replaced on doxycycline, Medrol Dosepak which she is start tomorrow and Tessalon Perles, she is to avoid NSAIDs such as ibuprofen Advil Aleve or Motrin may take Tylenol while on Medrol Dosepak, patient also has albuterol nebulizer at home which I did discuss the use of, patient verbalized understanding agreeable to plan discharge emergent care A+O x 4 stable condition no signs of distress    Orders and Diagnoses  Diagnoses and all orders for this visit:  Sinobronchitis  -     doxycycline (Vibramycin) 100 mg capsule; Take 1 capsule (100 mg) by mouth 2 times a day for 10 days. Take with at least 8 ounces (large glass) of water, do not lie down for 30 minutes after  -     methylPREDNISolone (Medrol Dospak) 4 mg tablets; Take as directed on package.  -     benzonatate (Tessalon) 200 mg capsule; Take 1 capsule (200 mg) by mouth every 8 hours for 10 days. Do not crush or chew.      Medical Admin Record      Patient disposition: Home    Electronically signed by Rei Mcnally PA-C  4:45 PM

## 2025-06-10 ENCOUNTER — PHARMACY VISIT (OUTPATIENT)
Dept: PHARMACY | Facility: CLINIC | Age: 60
End: 2025-06-10
Payer: COMMERCIAL

## 2025-06-10 PROCEDURE — RXMED WILLOW AMBULATORY MEDICATION CHARGE

## 2025-06-10 RX ORDER — CHLORHEXIDINE GLUCONATE ORAL RINSE 1.2 MG/ML
15 SOLUTION DENTAL DAILY
Qty: 473 ML | Refills: 0 | Status: SHIPPED | OUTPATIENT
Start: 2025-06-10 | End: 2025-06-12

## 2025-06-10 RX ORDER — CHLORHEXIDINE GLUCONATE 40 MG/ML
SOLUTION TOPICAL DAILY
Qty: 118 ML | Refills: 0 | Status: SHIPPED | OUTPATIENT
Start: 2025-06-10 | End: 2025-06-15

## 2025-06-11 ENCOUNTER — APPOINTMENT (OUTPATIENT)
Dept: CARDIOLOGY | Facility: CLINIC | Age: 60
End: 2025-06-11
Payer: COMMERCIAL

## 2025-06-11 ENCOUNTER — PRE-ADMISSION TESTING (OUTPATIENT)
Dept: PREADMISSION TESTING | Facility: HOSPITAL | Age: 60
End: 2025-06-11
Payer: COMMERCIAL

## 2025-06-11 VITALS
TEMPERATURE: 97.5 F | HEART RATE: 72 BPM | SYSTOLIC BLOOD PRESSURE: 134 MMHG | RESPIRATION RATE: 16 BRPM | HEIGHT: 66 IN | WEIGHT: 134.48 LBS | DIASTOLIC BLOOD PRESSURE: 73 MMHG | BODY MASS INDEX: 21.61 KG/M2 | OXYGEN SATURATION: 98 %

## 2025-06-11 VITALS
BODY MASS INDEX: 21.79 KG/M2 | SYSTOLIC BLOOD PRESSURE: 128 MMHG | WEIGHT: 135 LBS | OXYGEN SATURATION: 96 % | HEART RATE: 66 BPM | DIASTOLIC BLOOD PRESSURE: 80 MMHG

## 2025-06-11 DIAGNOSIS — Q21.12 PFO (PATENT FORAMEN OVALE) (HHS-HCC): ICD-10-CM

## 2025-06-11 DIAGNOSIS — R00.2 PALPITATIONS: ICD-10-CM

## 2025-06-11 DIAGNOSIS — I10 PRIMARY HYPERTENSION: ICD-10-CM

## 2025-06-11 DIAGNOSIS — K21.9 GASTROESOPHAGEAL REFLUX DISEASE, UNSPECIFIED WHETHER ESOPHAGITIS PRESENT: ICD-10-CM

## 2025-06-11 DIAGNOSIS — I10 BENIGN ESSENTIAL HTN: ICD-10-CM

## 2025-06-11 DIAGNOSIS — Z01.810 PREOPERATIVE CARDIOVASCULAR EXAMINATION: Primary | ICD-10-CM

## 2025-06-11 DIAGNOSIS — Z01.818 PREOP TESTING: Primary | ICD-10-CM

## 2025-06-11 DIAGNOSIS — M16.11 PRIMARY OSTEOARTHRITIS OF RIGHT HIP: ICD-10-CM

## 2025-06-11 DIAGNOSIS — F41.9 ANXIETY: ICD-10-CM

## 2025-06-11 DIAGNOSIS — G47.33 OSA (OBSTRUCTIVE SLEEP APNEA): ICD-10-CM
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
## 2025-06-11 PROBLEM — R07.0 PAIN IN THROAT: Status: RESOLVED | Noted: 2024-07-23 | Resolved: 2025-06-11

## 2025-06-11 PROBLEM — S69.90XA INJURY OF WRIST: Status: RESOLVED | Noted: 2023-04-07 | Resolved: 2025-06-11

## 2025-06-11 PROBLEM — R21 RASH: Status: RESOLVED | Noted: 2023-03-21 | Resolved: 2025-06-11

## 2025-06-11 PROBLEM — M77.10 LATERAL EPICONDYLITIS: Status: RESOLVED | Noted: 2023-04-07 | Resolved: 2025-06-11

## 2025-06-11 PROBLEM — M79.641 PAIN IN BOTH HANDS: Status: RESOLVED | Noted: 2023-11-22 | Resolved: 2025-06-11

## 2025-06-11 PROBLEM — U07.1 COVID-19: Status: RESOLVED | Noted: 2023-04-02 | Resolved: 2025-06-11

## 2025-06-11 PROBLEM — M25.551 RIGHT HIP PAIN: Status: ACTIVE | Noted: 2023-04-07

## 2025-06-11 PROBLEM — Z86.16 HISTORY OF SEVERE ACUTE RESPIRATORY SYNDROME CORONAVIRUS 2 (SARS-COV-2) DISEASE: Status: RESOLVED | Noted: 2023-03-21 | Resolved: 2025-06-11

## 2025-06-11 PROBLEM — S49.91XA INJURY OF RIGHT SHOULDER: Status: RESOLVED | Noted: 2023-03-21 | Resolved: 2025-06-11

## 2025-06-11 PROBLEM — Z96.642 STATUS POST TOTAL REPLACEMENT OF LEFT HIP: Status: ACTIVE | Noted: 2025-03-03

## 2025-06-11 PROBLEM — S09.90XA HEAD INJURY: Status: RESOLVED | Noted: 2023-03-21 | Resolved: 2025-06-11

## 2025-06-11 PROBLEM — R11.2 PONV (POSTOPERATIVE NAUSEA AND VOMITING): Status: RESOLVED | Noted: 2025-02-11 | Resolved: 2025-06-11

## 2025-06-11 PROBLEM — M79.642 PAIN IN BOTH HANDS: Status: RESOLVED | Noted: 2023-11-22 | Resolved: 2025-06-11

## 2025-06-11 PROBLEM — Z98.890 PONV (POSTOPERATIVE NAUSEA AND VOMITING): Status: RESOLVED | Noted: 2025-02-11 | Resolved: 2025-06-11

## 2025-06-11 PROBLEM — R10.9 FLANK PAIN: Status: RESOLVED | Noted: 2023-03-21 | Resolved: 2025-06-11

## 2025-06-11 PROBLEM — M21.70 LEG LENGTH DISCREPANCY: Status: ACTIVE | Noted: 2025-03-27

## 2025-06-11 PROBLEM — G54.9 NERVE ROOT DISORDER: Status: RESOLVED | Noted: 2023-03-21 | Resolved: 2025-06-11

## 2025-06-11 LAB
ABO GROUP (TYPE) IN BLOOD: NORMAL
ANTIBODY SCREEN: NORMAL
APTT PPP: 27 SECONDS (ref 26–36)
EST. AVERAGE GLUCOSE BLD GHB EST-MCNC: 117 MG/DL
HBA1C MFR BLD: 5.7 % (ref ?–5.7)
INR PPP: 1 (ref 0.9–1.1)
PROTHROMBIN TIME: 11.4 SECONDS (ref 9.8–12.4)
RH FACTOR (ANTIGEN D): NORMAL

## 2025-06-11 PROCEDURE — 93000 ELECTROCARDIOGRAM COMPLETE: CPT | Performed by: STUDENT IN AN ORGANIZED HEALTH CARE EDUCATION/TRAINING PROGRAM

## 2025-06-11 PROCEDURE — 1036F TOBACCO NON-USER: CPT | Performed by: STUDENT IN AN ORGANIZED HEALTH CARE EDUCATION/TRAINING PROGRAM

## 2025-06-11 PROCEDURE — 99214 OFFICE O/P EST MOD 30 MIN: CPT | Performed by: STUDENT IN AN ORGANIZED HEALTH CARE EDUCATION/TRAINING PROGRAM

## 2025-06-11 PROCEDURE — 85610 PROTHROMBIN TIME: CPT

## 2025-06-11 PROCEDURE — 3074F SYST BP LT 130 MM HG: CPT | Performed by: STUDENT IN AN ORGANIZED HEALTH CARE EDUCATION/TRAINING PROGRAM

## 2025-06-11 PROCEDURE — 36415 COLL VENOUS BLD VENIPUNCTURE: CPT

## 2025-06-11 PROCEDURE — 99204 OFFICE O/P NEW MOD 45 MIN: CPT | Performed by: NURSE PRACTITIONER

## 2025-06-11 PROCEDURE — 87081 CULTURE SCREEN ONLY: CPT | Mod: PARLAB

## 2025-06-11 PROCEDURE — 3079F DIAST BP 80-89 MM HG: CPT | Performed by: STUDENT IN AN ORGANIZED HEALTH CARE EDUCATION/TRAINING PROGRAM

## 2025-06-11 PROCEDURE — 86901 BLOOD TYPING SEROLOGIC RH(D): CPT

## 2025-06-11 PROCEDURE — 83036 HEMOGLOBIN GLYCOSYLATED A1C: CPT | Mod: PARLAB

## 2025-06-11 RX ORDER — ALBUTEROL SULFATE 90 UG/1
2 INHALANT RESPIRATORY (INHALATION) EVERY 4 HOURS PRN
COMMUNITY

## 2025-06-11 RX ORDER — ASPIRIN 81 MG/1
81 TABLET ORAL DAILY
COMMUNITY

## 2025-06-11 RX ORDER — FAMOTIDINE 20 MG/1
40 TABLET, FILM COATED ORAL
COMMUNITY
Start: 2024-12-29

## 2025-06-11 RX ORDER — ONDANSETRON 4 MG/1
4 TABLET, FILM COATED ORAL AS NEEDED
COMMUNITY
Start: 2025-06-10

## 2025-06-11 ASSESSMENT — DUKE ACTIVITY SCORE INDEX (DASI)
CAN YOU RUN A SHORT DISTANCE: YES
TOTAL_SCORE: 26.95
CAN YOU DO LIGHT WORK AROUND THE HOUSE LIKE DUSTING OR WASHING DISHES: YES
DASI METS SCORE: 6.1
CAN YOU WALK INDOORS, SUCH AS AROUND YOUR HOUSE: YES
CAN YOU WALK A BLOCK OR TWO ON LEVEL GROUND: YES
CAN YOU WALK A BLOCK OR TWO ON LEVEL GROUND: NO
CAN YOU DO YARD WORK LIKE RAKING LEAVES, WEEDING OR PUSHING A MOWER: NO
CAN YOU DO YARD WORK LIKE RAKING LEAVES, WEEDING OR PUSHING A MOWER: YES
CAN YOU DO MODERATE WORK AROUND THE HOUSE LIKE VACUUMING, SWEEPING FLOORS OR CARRYING GROCERIES: YES
CAN YOU RUN A SHORT DISTANCE: NO
DASI METS SCORE: 5.3
CAN YOU HAVE SEXUAL RELATIONS: YES
CAN YOU PARTICIPATE IN MODERATE RECREATIONAL ACTIVITIES LIKE GOLF, BOWLING, DANCING, DOUBLES TENNIS OR THROWING A BASEBALL OR FOOTBALL: NO
CAN YOU TAKE CARE OF YOURSELF (EAT, DRESS, BATHE, OR USE TOILET): YES
CAN YOU DO HEAVY WORK AROUND THE HOUSE LIKE SCRUBBING FLOORS OR LIFTING AND MOVING HEAVY FURNITURE: NO
CAN YOU DO MODERATE WORK AROUND THE HOUSE LIKE VACUUMING, SWEEPING FLOORS OR CARRYING GROCERIES: YES
CAN YOU PARTICIPATE IN STRENOUS SPORTS LIKE SWIMMING, SINGLES TENNIS, FOOTBALL, BASKETBALL, OR SKIING: NO
CAN YOU PARTICIPATE IN STRENOUS SPORTS LIKE SWIMMING, SINGLES TENNIS, FOOTBALL, BASKETBALL, OR SKIING: NO
CAN YOU TAKE CARE OF YOURSELF (EAT, DRESS, BATHE, OR USE TOILET): YES
CAN YOU PARTICIPATE IN MODERATE RECREATIONAL ACTIVITIES LIKE GOLF, BOWLING, DANCING, DOUBLES TENNIS OR THROWING A BASEBALL OR FOOTBALL: NO
CAN YOU DO HEAVY WORK AROUND THE HOUSE LIKE SCRUBBING FLOORS OR LIFTING AND MOVING HEAVY FURNITURE: NO
CAN YOU CLIMB A FLIGHT OF STAIRS OR WALK UP A HILL: YES
CAN YOU HAVE SEXUAL RELATIONS: NO
CAN YOU DO LIGHT WORK AROUND THE HOUSE LIKE DUSTING OR WASHING DISHES: YES
TOTAL_SCORE: 20.45
CAN YOU WALK INDOORS, SUCH AS AROUND YOUR HOUSE: YES
CAN YOU CLIMB A FLIGHT OF STAIRS OR WALK UP A HILL: NO

## 2025-06-11 NOTE — CPM/PAT H&P
CPM/PAT Evaluation       Name: Nicolasa Burger (Marisa Koteles)  /Age: 1965/59 y.o.     In-Person       Chief Complaint: PAT for planned surgery    59 yr old female w/PHx of anxiety, CVA (2021), PEDRO, HTN, GERD and Right hip OA referred to PAT for planned Right hip replacement w/Dr Brooke on 2025      Patient reports feeling overall well, denies fever, cough or recent infection. Denies hx of stroke, seizure or blood clot.  Reports remaining otherwise physically active; denies cardiac or respiratory symptoms. Past surgical hx includes PFO repair (3/2022),  x4, endometrial ablation, breast augmentation, Left knee replacement, Left hip replacement on 2025; denies past issues with anesthesia.      Followed by PCP (Harvinder Hussein DO) 2025  Followed by cardiology (Cheikh Jones MD) - seen today for cardiac clearance with instructions to hold aspirin 5 days before surgery     ED visit 2025; patient states once treated in ED with fluids recovered well and has been feeling well since.       Of note:   2021 with severe COVID pneumonia. This was complicated by acute stroke which was treated with TPA. CT of the head confirmed a right parietal infarct. She had residual left facial droop, dysarthria and left-sided weakness which is since resolved. She is placed on aspirin, high intensity statin and low-dose Xarelto. She underwent transthoracic echo which demonstrated atrial septal aneurysm with PFO. She underwent percutaneous PFO closure with a 30 mm Cardioform device 2022.               Medical History[1]    Surgical History[2]    Patient Sexual activity questions deferred to the physician.    Family History[3]    Allergies[4]    Prior to Admission medications    Medication Sig Start Date End Date Taking? Authorizing Provider   acetaminophen (Tylenol) 325 mg tablet Take 3 tablets (975 mg) by mouth 3 times a day. 25   CYN Pacheco-CNS   albuterol 90 mcg/actuation inhaler  Inhale 2 puffs every 4 hours if needed.    Historical Provider, MD   azelastine (Astelin) 137 mcg (0.1 %) nasal spray Administer 2 sprays into each nostril 2 times a day. 11/4/24 11/4/25  Harvinder Hussein DO   b complex 0.4 mg tablet Take 1 tablet by mouth once daily.    Historical Provider, MD   chlorhexidine (Hibiclens) 4 % external liquid Use to wash affected area once daily for 5 days prior to surgery with day 5 being morning of surgery as directed 6/10/25 6/15/25  Haily Gavin APRN-CNP   chlorhexidine (Peridex) 0.12 % solution Swish and Spit 1 capful (15 mL) in the mouth once daily the day before surgery and again morning on day of surgery as directed 6/10/25 6/12/25  Haily Gavin APRN-CNP   cyanocobalamin, vitamin B-12, 1,000 mcg lozenge 1 lozenge po qday 7/23/24   Andres Vega DO   cyclobenzaprine (Flexeril) 5 mg tablet Take 1 tablet (5 mg) by mouth 3 times a day as needed for muscle spasms for up to 5 days. 2/12/25 2/17/25  Yesika Pretty APRN-CNS   famotidine (Pepcid) 20 mg tablet Take 2 tablets (40 mg) by mouth once daily. 12/29/24   Historical Provider, MD   magnesium oxide (Mag-Ox) 400 mg tablet Take 1 tablet (400 mg) by mouth once daily.    Historical Provider, MD   metoprolol succinate XL (Toprol-XL) 50 mg 24 hr tablet Take 1 tablet (50 mg) by mouth once daily. Do not crush or chew. 7/23/24   Andres Vega DO   omeprazole (PriLOSEC) 40 mg DR capsule Take 1 capsule (40 mg) by mouth 2 times a day before meals. Do not crush or chew. 7/23/24 7/23/25  Andres Vega DO   ondansetron (Zofran) 4 mg tablet Take 1 tablet (4 mg) by mouth if needed. 6/10/25   Historical Provider, MD   sertraline (Zoloft) 100 mg tablet Take 1.5 tablets (150 mg) by mouth once daily. 7/23/24 7/23/25  Andres Vega DO   apixaban (Eliquis) 2.5 mg tablet Take 1 tablet (2.5 mg) by mouth every 12 hours. 2/12/25 6/11/25  CYN Pacheco-CNS   hydrOXYzine HCL (Atarax) 25 mg tablet Take 1 tablet (25 mg) by mouth once daily at  "bedtime. 7/23/24 6/11/25  Andres Vega DO   polyethylene glycol (Glycolax, Miralax) 17 gram packet Take 17 g by mouth once daily as needed (for constipation). 2/12/25 6/11/25  Yesika Pretty, APRN-CNS        A ten-point review of systems was completed and is otherwise negative except for what is mentioned in the HPI above.    Physical Exam  Vitals reviewed.   Constitutional:       Appearance: Normal appearance.   HENT:      Head: Normocephalic.      Mouth/Throat:      Mouth: Mucous membranes are moist.   Eyes:      Pupils: Pupils are equal, round, and reactive to light.      Comments: Corrective lenses in use   Cardiovascular:      Rate and Rhythm: Normal rate and regular rhythm.   Pulmonary:      Effort: Pulmonary effort is normal.      Breath sounds: Normal breath sounds.   Abdominal:      General: Bowel sounds are normal.   Musculoskeletal:         General: Normal range of motion.   Skin:     General: Skin is warm and dry.   Neurological:      Mental Status: She is alert and oriented to person, place, and time.   Psychiatric:         Mood and Affect: Mood normal.          PAT AIRWAY:   Airway:     Mallampati::  I    TM distance::  >3 FB    Neck ROM::  Full  normal        Testing/Diagnostic: CBC, MBP, A1c, coag, T&S, staph/MRSA, ecg    Patient Specialist/PCP: Harvinder Hussein DO (PCP) 2/2025; Cheikh Jones MD (cardiology) 6/11/2025    Visit Vitals  /73   Pulse 72   Temp 36.4 °C (97.5 °F)   Resp 16   Ht 1.676 m (5' 6\")   Wt 61 kg (134 lb 7.7 oz)   SpO2 98%   BMI 21.71 kg/m²   OB Status Postmenopausal   Smoking Status Never   BSA 1.69 m²       DASI Risk Score      Flowsheet Row Pre-Admission Testing from 6/11/2025 in [facility] Pre-Admission Testing from 2/3/2025 in [facility]   Can you take care of yourself (eat, dress, bathe, or use toilet)?  2.75 filed at 06/11/2025 1357 2.75 filed at 02/03/2025 1335   Can you walk indoors, such as around your house? 1.75 filed at 06/11/2025 1357 0 " filed at 02/03/2025 1335   Can you walk a block or two on level ground?  0 filed at 06/11/2025 1357 0 filed at 02/03/2025 1335   Can you climb a flight of stairs or walk up a hill? 0 filed at 06/11/2025 1357 0 filed at 02/03/2025 1335   Can you run a short distance? 0 filed at 06/11/2025 1357 0 filed at 02/03/2025 1335   Can you do light work around the house like dusting or washing dishes? 2.7 filed at 06/11/2025 1357 2.7 filed at 02/03/2025 1335   Can you do moderate work around the house like vacuuming, sweeping floors or carrying groceries? 3.5 filed at 06/11/2025 1357 0 filed at 02/03/2025 1335   Can you do heavy work around the house like scrubbing floors or lifting and moving heavy furniture?  0 filed at 06/11/2025 1357 0 filed at 02/03/2025 1335   Can you do yard work like raking leaves, weeding or pushing a mower? 4.5 filed at 06/11/2025 1357 0 filed at 02/03/2025 1335   Can you have sexual relations? 5.25 filed at 06/11/2025 1357 5.25 filed at 02/03/2025 1335   Can you participate in moderate recreational activities like golf, bowling, dancing, doubles tennis or throwing a baseball or football? 0 filed at 06/11/2025 1357 0 filed at 02/03/2025 1335   Can you participate in strenous sports like swimming, singles tennis, football, basketball, or skiing? 0 filed at 06/11/2025 1357 0 filed at 02/03/2025 1335   DASI SCORE 20.45 filed at 06/11/2025 1357 10.7 filed at 02/03/2025 1335   METS Score (Will be calculated only when all the questions are answered) 5.3 filed at 06/11/2025 1357 4.1 filed at 02/03/2025 8700          Mohiti DVT Assessment      Flowsheet Row Admission (Discharged) from 2/11/2025 in [facility] 2 with Neo Brooke MD   DVT Score (IF A SCORE IS NOT CALCULATING, MUST SELECT A BMI TO COMPLETE) 7 filed at 02/11/2025 0971   Surgical Factors Elective major lower extremity arthroplasty filed at 02/11/2025 0917   BMI (BMI MUST BE CHOSEN) 30 or less filed at 02/11/2025 0917           Modified Frailty Index    No data to display       [id number] Stroke Risk Points  Current as of just now        N/A 0 to 9 Points:      Last Change: N/A          The [id number] risk score (Lip CARLOS, et al. 2009. © 2010 [employer]) quantifies the risk of stroke for a patient with atrial fibrillation. For patients without atrial fibrillation or under the age of 18 this score appears as N/A. Higher score values generally indicate higher risk of stroke.        This score is not applicable to this patient. Components are not calculated.          Revised Cardiac Risk Index    No data to display       Apfel Simplified Score    No data to display       Risk Analysis Index Results This Encounter    No data found in the last 10 encounters.       Stop Bang Score      Flowsheet Row Pre-Admission Testing from 6/11/2025 in [facility] Pre-Admission Testing from 2/3/2025 in [facility]   Do you snore loudly? 1 filed at 06/11/2025 1347 0 filed at 02/03/2025 1335   Do you often feel tired or fatigued after your sleep? 1 filed at 06/11/2025 1347 0 filed at 02/03/2025 1335   Has anyone ever observed you stop breathing in your sleep? 1 filed at 06/11/2025 1347 1 filed at 02/03/2025 1335   Do you have or are you being treated for high blood pressure? 1 filed at 06/11/2025 1347 1 filed at 02/03/2025 1335   Recent BMI (Calculated) 23.1 filed at 06/11/2025 1347 23.1 filed at 02/03/2025 1335   Is BMI greater than 35 kg/m2? 0=No filed at 06/11/2025 1347 0=No filed at 02/03/2025 1335   Age older than 50 years old? 1=Yes filed at 06/11/2025 1347 1=Yes filed at 02/03/2025 1335   Is your neck circumference greater than 17 inches (Male) or 16 inches (Female)? 1 filed at 06/11/2025 1347 --   Gender - Male 0=No filed at 06/11/2025 1347 0=No filed at 02/03/2025 1335   STOP-BANG Total Score 6 filed at 06/11/2025 1347 --          Prodigy: High Risk  Total Score: 0          ARISCAT Score for  Postoperative Pulmonary Complications      Flowsheet Row Pre-Admission Testing from 6/11/2025 in [facility] Pre-Admission Testing from 2/3/2025 in [facility]   Age Calculated Score 3 filed at 06/11/2025 1437 3 filed at 02/03/2025 1539   Preoperative SpO2 0 filed at 06/11/2025 1437 0 filed at 02/03/2025 1539   Respiratory infection in the last month Either upper or lower (i.e., URI, bronchitis, pneumonia), with fever and antibiotic treatment 0 filed at 06/11/2025 1437 0 filed at 02/03/2025 1539   Preoperative anemia (Hgb less than 10 g/dl) 0 filed at 06/11/2025 1437 0 filed at 02/03/2025 1539   Surgical incision  0 filed at 06/11/2025 1437 0 filed at 02/03/2025 1539   Duration of surgery  16 filed at 06/11/2025 1437 0 filed at 02/03/2025 1539   Emergency Procedure  0 filed at 06/11/2025 1437 0 filed at 02/03/2025 1539   ARISCAT Total Score  19 filed at 06/11/2025 1437 3 filed at 02/03/2025 1539          Rai Perioperative Risk for Myocardial Infarction or Cardiac Arrest (GAVIOTA)      Flowsheet Row Pre-Admission Testing from 6/11/2025 in [facility] Pre-Admission Testing from 2/3/2025 in [facility]   Calculated Age Score 1.18 filed at 06/11/2025 1437 1.18 filed at 02/03/2025 1540   Functional Status  0 filed at 06/11/2025 1437 0.65 filed at 02/03/2025 1540   ASA Class  -3.29 filed at 06/11/2025 1437 -3.29 filed at 02/03/2025 1540   Creatinine 0 filed at 06/11/2025 1437 0 filed at 02/03/2025 1540   Type of Procedure  0.80 filed at 06/11/2025 1437 0.80 filed at 02/03/2025 1540   GAVIOTA Total Score  -6.56 filed at 06/11/2025 1437 -5.91 filed at 02/03/2025 1540   GAVIOTA % 0.14 filed at 06/11/2025 1437 0.27 filed at 02/03/2025 1540            Assessment and Plan:     # Anxiety - continue sertraline  # CVA - (11/2021), has since recovered with no residual; followed by PCP  # PEDRO - unable to tolerate CPAP  #Seasonal allergies - has not needed to use inhaler for several months  #  HTN - continue metoprolol  # GERD - continue omeprazole, famotidine  # Right hip OA - Right total hip replacement anterior approach w/c-arm w/Dr Brooke on 6/24/2025    Ecg complete 6/11/2025 - NSR, possible Left atrial enlargement, nonspecific ST and T wave abnormality (62 bpm)  Medical hx, Allergies, VS and Labs reviewed  Medications addressed w/pre-op instructions provided    Cardiac clearance from Dr Jones visit 6/11/2025 pending    Echocardiogram 6/9/2023  CONCLUSIONS:  1. Left ventricular systolic function is normal with a 60-65% estimated ejection fraction.  2. Spectral Doppler shows an impaired relaxation pattern of left ventricular diastolic filling.  3. PFO closure device noted; device is well-seated.  4. There is no evidence of a patent foramen ovale.  5. RVSP within normal limits.         [1]   Past Medical History:  Diagnosis Date    Acute upper respiratory infection, unspecified 11/01/2021    Viral URI with cough    Anxiety     Arthritis     Asthma     Chronic bronchitis (Multi)     CVA (cerebral vascular accident) (Multi)     Dysphagia     Easy bruising     Effusion, unspecified knee 07/02/2021    Suprapatellar effusion of knee    Encounter for screening for malignant neoplasm of colon 05/06/2015    Screening for colon cancer    GERD (gastroesophageal reflux disease)     Hypertension     Joint pain     Pain in left knee 07/06/2021    Acute pain of left knee    Pain in left knee 09/07/2017    Left knee pain    Pain in left lower leg 07/02/2021    Pain of left calf    Pain in right finger(s)     Pain of right thumb    Pancreatitis (HHS-HCC)     Personal history of other diseases of the nervous system and sense organs 06/28/2016    History of labyrinthitis    Personal history of other diseases of the nervous system and sense organs     History of migraine    Personal history of other diseases of the respiratory system 02/10/2021    History of pharyngitis    Personal history of other endocrine,  nutritional and metabolic disease     History of goiter    Personal history of other infectious and parasitic diseases     History of varicella    Personal history of other infectious and parasitic diseases     History of mumps    Personal history of other infectious and parasitic diseases 2021    History of herpes zoster    Personal history of other infectious and parasitic diseases 2021    History of viral infection    Personal history of other medical treatment     History of echocardiogram    Personal history of other medical treatment     History of cardiac monitoring    Personal history of other specified conditions 2021    History of right flank pain    PFO (patent foramen ovale) ([facility])         Pneumonia     Right upper quadrant pain 2015    RUQ abdominal pain    Segmental and somatic dysfunction of cervical region 2015    Segmental and somatic dysfunction of cervical region    Shortness of breath     Strain of other muscle(s) and tendon(s) at lower leg level, left leg, initial encounter 2021    Strain of calf muscle, left, initial encounter    Strain of unspecified muscle(s) and tendon(s) at lower leg level, left leg, initial encounter 2021    Strain of left knee, initial encounter    Unilateral primary osteoarthritis, left knee 10/20/2016    Osteoarthritis of left knee    Unspecified acute conjunctivitis, bilateral 2017    Acute conjunctivitis of both eyes, unspecified acute conjunctivitis type    Unspecified tear of unspecified meniscus, current injury, left knee, initial encounter 2017    Tear of meniscus of left knee    Urinary tract infection, site not specified 2021    Acute lower UTI    Vision loss    [2]   Past Surgical History:  Procedure Laterality Date    APPENDECTOMY  2015    Appendectomy    BELT ABDOMINOPLASTY  2015    Abdominoplasty    BREAST BIOPSY       SECTION, CLASSIC  2015     Section      SECTION, LOW TRANSVERSE      COLONOSCOPY      CT ANGIO NECK  2021    CT NECK ANGIO W AND WO IV CONTRAST 2021 DOCTOR OFFICE LEGACY    CT HEAD ANGIO W AND WO IV CONTRAST  2021    CT HEAD ANGIO W AND WO IV CONTRAST 2021 DOCTOR OFFICE LEGACY    ENDOMETRIAL ABLATION  2015    Gynecologic Services Thermal Endometrial Ablation    HIP ARTHROPLASTY      KNEE ARTHROPLASTY      OTHER SURGICAL HISTORY  2015    Breast Surgery Enlargement Procedure    OTHER SURGICAL HISTORY  2021    Knee replacement    OTHER SURGICAL HISTORY  2022    Patent foramen ovale repair    PATENT FORAMEN OVALE CLOSURE      MD BREAST AUGMENTATION WITH IMPLANT      UPPER GASTROINTESTINAL ENDOSCOPY     [3]   Family History  Problem Relation Name Age of Onset    Anxiety disorder Mother      Arthritis Mother      Arthritis Father          seasonal    Alcohol abuse Father          severe    Yun's esophagus Father      Breast cancer Sister      Diabetes Maternal Grandmother     [4] No Known Allergies

## 2025-06-11 NOTE — PROGRESS NOTES
Chief Complaint:   Hypertension, Hyperlipidemia, Palpitations, and PFO (Former [facility] patient/Needs cardiac clearance(THR))     History Of Present Illness:    Nicolasa Burger is a 59 y.o. female past history notable for hypertension, hyperlipidemia, history of CVA status post TPO and history of PFO closure in 2022 chronic osteoarthritis recent left hip replacement who is here to establish care.  Patient is doing well overall.  Denies chest pain shortness of breath.  We first met in February of this year.  She is also scheduled undergo right hip replacement.  She had no complications with recent surgery.  She denies chest pain shortness of breath or palpitations.  She is compliant with her current medical therapy.  Denies heavy ethanol consumption.     Last Recorded Vitals:  Vitals:    06/11/25 1111   BP: 128/80   BP Location: Left arm   Patient Position: Sitting   BP Cuff Size: Adult   Pulse: 66   SpO2: 96%   Weight: 61.2 kg (135 lb)       Review of Systems  ROS      Allergies:  Patient has no known allergies.    Outpatient Medications:  Current Outpatient Medications   Medication Instructions    acetaminophen (TYLENOL) 975 mg, oral, 3 times daily    albuterol 90 mcg/actuation inhaler 2 puffs, Every 4 hours PRN    azelastine (Astelin) 137 mcg (0.1 %) nasal spray 2 sprays, Each Nostril, 2 times daily    b complex 0.4 mg tablet 1 tablet, Daily    chlorhexidine (Hibiclens) 4 % external liquid Use to wash affected area once daily for 5 days prior to surgery with day 5 being morning of surgery as directed    chlorhexidine (Peridex) 0.12 % solution Swish and Spit 1 capful (15 mL) in the mouth once daily the day before surgery and again morning on day of surgery as directed    cyanocobalamin, vitamin B-12, 1,000 mcg lozenge 1 lozenge po qday    cyclobenzaprine (FLEXERIL) 5 mg, oral, 3 times daily PRN    famotidine (PEPCID) 40 mg, Daily RT    hydrOXYzine HCL (ATARAX) 25 mg, oral, Nightly    magnesium oxide (MAG-OX) 400 mg,  Daily    metoprolol succinate XL (TOPROL-XL) 50 mg, oral, Daily, Do not crush or chew.    omeprazole (PRILOSEC) 40 mg, oral, 2 times daily before meals, Do not crush or chew.    ondansetron (ZOFRAN) 4 mg, As needed    polyethylene glycol (GLYCOLAX, MIRALAX) 17 g, oral, Daily PRN    sertraline (ZOLOFT) 150 mg, oral, Daily       Physical Exam:  General: No acute distress,  A&O x3  Skin: Warm and dry  Neck: JVD is not elevated  ENT: Moist mucous membranes no lesions appreciated  Pulmonary: CTAB  Cards: Regular rate rhythm, no murmurs gallops or rubs appreciated normal S1-S2  Abdomen: Soft nontender nondistended  Extremities: No edema or cyanosis  Psych: Appropriate mood and affect        Last Labs:  CBC -  Lab Results   Component Value Date    WBC 9.5 02/12/2025    HGB 10.8 (L) 02/12/2025    HCT 33.8 (L) 02/12/2025    MCV 96 02/12/2025     02/12/2025       CMP -  Lab Results   Component Value Date    CALCIUM 10.5 (H) 12/08/2023    PHOS 3.4 12/19/2022    PROT 7.0 12/08/2023    ALBUMIN 4.3 12/08/2023    AST 27 12/08/2023    ALT 21 12/08/2023    ALKPHOS 85 12/08/2023    BILITOT 0.4 12/08/2023       LIPID PANEL -   Lab Results   Component Value Date    CHOL 249 (H) 12/15/2022    TRIG 76 12/15/2022    HDL 63.6 12/15/2022    CHHDL 3.9 12/15/2022    LDLF 170 (H) 12/15/2022    VLDL 15 12/15/2022    NHDL 214 08/29/2019       RENAL FUNCTION PANEL -   Lab Results   Component Value Date    GLUCOSE 141 (H) 12/08/2023     12/08/2023    K 4.1 12/08/2023     12/08/2023    CO2 21 12/08/2023    ANIONGAP 19 12/08/2023    BUN 23 12/08/2023    CREATININE 0.74 12/08/2023    CALCIUM 10.5 (H) 12/08/2023    PHOS 3.4 12/19/2022    ALBUMIN 4.3 12/08/2023        Lab Results   Component Value Date    BNP 95 11/08/2021    HGBA1C 5.4 12/28/2022       Last Cardiology Tests:  ECG:  No results found for this or any previous visit (from the past 4464 hours).     Echo:  No results found for this or any previous visit from the past 1095  "days.       Ejection Fractions:  No results found for: \"EF\"    Assessment/Plan     1.  Hypertension: Reasonably controlled.  Continue oral metoprolol 50 mg once a day.     2.  History of CVA status post tPA     3.  History of PFO closure in 2022 with Dr. Atkins.      4.  Cardiovascular risk assessment for planned left hip replacement.  RCRI score of 1.  Asymptomatic at baseline.  Able to achieve greater than 4 METS of activity.  Baseline ECG of sinus rhythm left atrial enlargement with nonspecific ST changes.  Based on these factors, patient is overall risk of major adverse cardiac events for planned surgery is low and therefore nonprohibitive proceed.    (This note was generated with voice recognition software and may contain errors including spelling, grammar, syntax and missed recognition of what was dictated, of which may not have been fully corrected)      Cheikh Jones MD PhD  "

## 2025-06-11 NOTE — PREPROCEDURE INSTRUCTIONS
Medication List            Accurate as of June 11, 2025  2:32 PM. Always use your most recent med list.                acetaminophen 325 mg tablet  Commonly known as: Tylenol  Take 3 tablets (975 mg) by mouth 3 times a day.  Medication Adjustments for Surgery: Take/Use as prescribed     albuterol 90 mcg/actuation inhaler  Medication Adjustments for Surgery: Take/Use as prescribed     aspirin 81 mg EC tablet  Additional Medication Adjustments for Surgery: Take last dose 5 days before surgery     azelastine 137 mcg (0.1 %) nasal spray  Commonly known as: Astelin  Administer 2 sprays into each nostril 2 times a day.  Medication Adjustments for Surgery: Take/Use as prescribed     b complex 0.4 mg tablet  Additional Medication Adjustments for Surgery: Take last dose 7 days before surgery     * chlorhexidine 0.12 % solution  Commonly known as: Peridex  Swish and Spit 1 capful (15 mL) in the mouth once daily the day before surgery and again morning on day of surgery as directed  Medication Adjustments for Surgery: Take/Use as prescribed     * Mayuri-Hex 4 % external liquid  Generic drug: chlorhexidine  Use to wash affected area once daily for 5 days prior to surgery with day 5 being morning of surgery as directed  Medication Adjustments for Surgery: Take/Use as prescribed     cyanocobalamin (vitamin B-12) 1,000 mcg lozenge  1 lozenge po qday  Additional Medication Adjustments for Surgery: Take last dose 7 days before surgery     cyclobenzaprine 5 mg tablet  Commonly known as: Flexeril  Take 1 tablet (5 mg) by mouth 3 times a day as needed for muscle spasms for up to 5 days.  Medication Adjustments for Surgery: Do Not take on the morning of surgery     famotidine 20 mg tablet  Commonly known as: Pepcid  Medication Adjustments for Surgery: Take/Use as prescribed     magnesium oxide 400 mg tablet  Commonly known as: Mag-Ox  Medication Adjustments for Surgery: Do Not take on the morning of surgery     metoprolol succinate XL  50 mg 24 hr tablet  Commonly known as: Toprol-XL  Take 1 tablet (50 mg) by mouth once daily. Do not crush or chew.  Medication Adjustments for Surgery: Take/Use as prescribed     omeprazole 40 mg DR capsule  Commonly known as: PriLOSEC  Take 1 capsule (40 mg) by mouth 2 times a day before meals. Do not crush or chew.  Medication Adjustments for Surgery: Take/Use as prescribed     ondansetron 4 mg tablet  Commonly known as: Zofran  Medication Adjustments for Surgery: Take/Use as prescribed     sertraline 100 mg tablet  Commonly known as: Zoloft  Take 1.5 tablets (150 mg) by mouth once daily.  Medication Adjustments for Surgery: Take/Use as prescribed           * This list has 2 medication(s) that are the same as other medications prescribed for you. Read the directions carefully, and ask your doctor or other care provider to review them with you.                One of our staff members will call you one business day before your surgery between 12-4pm to let you know the time to arrive at the hospital. If you have not received a phone call by 2pm call [phone].     When you arrive at the hospital, go to Registration on the ground floor.   You will need a responsible adult to drive you home.     Prior to surgery date:  One (1) week prior to surgery STOP:  -Stop aspirin products. Do not take NSAIDS/ Ibuprofen, Aleve, Motrin. Okay to take Tylenol or Acetaminophen. Do not take vitamins, supplements, herbals, diet pills.   -Stop these medications:   -No alcohol for 24 hours prior to surgery.  -No smoking 24 hours prior. No Marijuana, CBD oil, or Vaping 48 hours prior to surgery.  -Enjoy a light supper the evening before surgery.    Day of Surgery:  -Nothing to eat or drink after midnight. This includes food of any kind (including hard candy, cough drops, mints and gum, coffee).   -Have 13oz of Clear liquids 2hrs prior to Arrival time.   -No acrylic nails or nail polish on at least one fingernail; no polish on toes for  foot surgery.   -No body piercing or jewelry.   -Shower as directed. No deodorant, lotion, power, oils, perfume, make-up.  -Mouthwash night before and morning of surgery.   -Wear loose comfortable clothing to accommodate your bandages.     -Bring CPAP machine  -Bring as needed inhalers  -Bring crutches/ walker if directed     -If  you have questions for PAT please call [phone].

## 2025-06-11 NOTE — PREPROCEDURE INSTRUCTIONS
One of our staff members will call you one business day before your surgery between 12-4pm to let you know the time to arrive at the hospital. If you have not received a phone call by 2pm call [phone].     When you arrive at the hospital, go to Registration on the ground floor.   You will need a responsible adult to drive you home.     Prior to surgery date:  One (1) week prior to surgery STOP:  -Stop aspirin products. Do not take NSAIDS/ Ibuprofen, Aleve, Motrin. Okay to take Tylenol or Acetaminophen. Do not take vitamins, supplements, herbals, diet pills.   -Stop these medications:   -No alcohol for 24 hours prior to surgery.  -No smoking 24 hours prior. No Marijuana, CBD oil, or Vaping 48 hours prior to surgery.  -Enjoy a light supper the evening before surgery.    Day of Surgery:  -Nothing to eat or drink after midnight. This includes food of any kind (including hard candy, cough drops, mints and gum, coffee).   -Have 13oz of Clear liquids 2hrs prior to Arrival time.   -No acrylic nails or nail polish on at least one fingernail; no polish on toes for foot surgery.   -No body piercing or jewelry.   -Shower as directed. No deodorant, lotion, power, oils, perfume, make-up.  -Mouthwash night before and morning of surgery.   -Wear loose comfortable clothing to accommodate your bandages.     -Bring CPAP machine  -Bring as needed inhalers  -Bring crutches/ walker if directed     -If  you have questions for PAT please call [phone].      Medication List            Accurate as of June 11, 2025  2:25 PM. Always use your most recent med list.                acetaminophen 325 mg tablet  Commonly known as: Tylenol  Take 3 tablets (975 mg) by mouth 3 times a day.  Medication Adjustments for Surgery: Take/Use as prescribed     albuterol 90 mcg/actuation inhaler  Medication Adjustments for Surgery: Take/Use as prescribed     aspirin 81 mg EC tablet     azelastine 137 mcg (0.1 %) nasal spray  Commonly known as:  Astelin  Administer 2 sprays into each nostril 2 times a day.  Medication Adjustments for Surgery: Take/Use as prescribed     b complex 0.4 mg tablet  Additional Medication Adjustments for Surgery: Take last dose 7 days before surgery     * chlorhexidine 0.12 % solution  Commonly known as: Peridex  Swish and Spit 1 capful (15 mL) in the mouth once daily the day before surgery and again morning on day of surgery as directed  Medication Adjustments for Surgery: Take/Use as prescribed     * Mayuri-Hex 4 % external liquid  Generic drug: chlorhexidine  Use to wash affected area once daily for 5 days prior to surgery with day 5 being morning of surgery as directed  Medication Adjustments for Surgery: Take/Use as prescribed     cyanocobalamin (vitamin B-12) 1,000 mcg lozenge  1 lozenge po qday  Additional Medication Adjustments for Surgery: Take last dose 7 days before surgery     cyclobenzaprine 5 mg tablet  Commonly known as: Flexeril  Take 1 tablet (5 mg) by mouth 3 times a day as needed for muscle spasms for up to 5 days.  Medication Adjustments for Surgery: Do Not take on the morning of surgery     famotidine 20 mg tablet  Commonly known as: Pepcid  Medication Adjustments for Surgery: Take/Use as prescribed     magnesium oxide 400 mg tablet  Commonly known as: Mag-Ox  Medication Adjustments for Surgery: Do Not take on the morning of surgery     metoprolol succinate XL 50 mg 24 hr tablet  Commonly known as: Toprol-XL  Take 1 tablet (50 mg) by mouth once daily. Do not crush or chew.  Medication Adjustments for Surgery: Take/Use as prescribed     omeprazole 40 mg DR capsule  Commonly known as: PriLOSEC  Take 1 capsule (40 mg) by mouth 2 times a day before meals. Do not crush or chew.  Medication Adjustments for Surgery: Take/Use as prescribed     ondansetron 4 mg tablet  Commonly known as: Zofran  Medication Adjustments for Surgery: Take/Use as prescribed     sertraline 100 mg tablet  Commonly known as: Zoloft  Take 1.5  tablets (150 mg) by mouth once daily.  Medication Adjustments for Surgery: Take/Use as prescribed           * This list has 2 medication(s) that are the same as other medications prescribed for you. Read the directions carefully, and ask your doctor or other care provider to review them with you.

## 2025-06-11 NOTE — H&P (VIEW-ONLY)
CPM/PAT Evaluation       Name: Nicolasa Burger (Marisa Koteles)  /Age: 1965/59 y.o.     In-Person       Chief Complaint: PAT for planned surgery    59 yr old female w/PHx of anxiety, CVA (2021), PEDRO, HTN, GERD and Right hip OA referred to PAT for planned Right hip replacement w/Dr Brooke on 2025      Patient reports feeling overall well, denies fever, cough or recent infection. Denies hx of stroke, seizure or blood clot.  Reports remaining otherwise physically active; denies cardiac or respiratory symptoms. Past surgical hx includes PFO repair (3/2022),  x4, endometrial ablation, breast augmentation, Left knee replacement, Left hip replacement on 2025; denies past issues with anesthesia.      Followed by PCP (Harvinder Hussein DO) 2025  Followed by cardiology (Cheikh Jones MD) - seen today for cardiac clearance with instructions to hold aspirin 5 days before surgery     ED visit 2025; patient states once treated in ED with fluids recovered well and has been feeling well since.       Of note:   2021 with severe COVID pneumonia. This was complicated by acute stroke which was treated with TPA. CT of the head confirmed a right parietal infarct. She had residual left facial droop, dysarthria and left-sided weakness which is since resolved. She is placed on aspirin, high intensity statin and low-dose Xarelto. She underwent transthoracic echo which demonstrated atrial septal aneurysm with PFO. She underwent percutaneous PFO closure with a 30 mm Cardioform device 2022.               Medical History[1]    Surgical History[2]    Patient Sexual activity questions deferred to the physician.    Family History[3]    Allergies[4]    Prior to Admission medications    Medication Sig Start Date End Date Taking? Authorizing Provider   acetaminophen (Tylenol) 325 mg tablet Take 3 tablets (975 mg) by mouth 3 times a day. 25   CYN Pacheco-CNS   albuterol 90 mcg/actuation inhaler  Inhale 2 puffs every 4 hours if needed.    Historical Provider, MD   azelastine (Astelin) 137 mcg (0.1 %) nasal spray Administer 2 sprays into each nostril 2 times a day. 11/4/24 11/4/25  Harvinder Hussein DO   b complex 0.4 mg tablet Take 1 tablet by mouth once daily.    Historical Provider, MD   chlorhexidine (Hibiclens) 4 % external liquid Use to wash affected area once daily for 5 days prior to surgery with day 5 being morning of surgery as directed 6/10/25 6/15/25  Haily Gavin APRN-CNP   chlorhexidine (Peridex) 0.12 % solution Swish and Spit 1 capful (15 mL) in the mouth once daily the day before surgery and again morning on day of surgery as directed 6/10/25 6/12/25  Haily Gavin APRN-CNP   cyanocobalamin, vitamin B-12, 1,000 mcg lozenge 1 lozenge po qday 7/23/24   Andres Vega DO   cyclobenzaprine (Flexeril) 5 mg tablet Take 1 tablet (5 mg) by mouth 3 times a day as needed for muscle spasms for up to 5 days. 2/12/25 2/17/25  Yesika Pretty APRN-CNS   famotidine (Pepcid) 20 mg tablet Take 2 tablets (40 mg) by mouth once daily. 12/29/24   Historical Provider, MD   magnesium oxide (Mag-Ox) 400 mg tablet Take 1 tablet (400 mg) by mouth once daily.    Historical Provider, MD   metoprolol succinate XL (Toprol-XL) 50 mg 24 hr tablet Take 1 tablet (50 mg) by mouth once daily. Do not crush or chew. 7/23/24   Andres Vega DO   omeprazole (PriLOSEC) 40 mg DR capsule Take 1 capsule (40 mg) by mouth 2 times a day before meals. Do not crush or chew. 7/23/24 7/23/25  Andres Vega DO   ondansetron (Zofran) 4 mg tablet Take 1 tablet (4 mg) by mouth if needed. 6/10/25   Historical Provider, MD   sertraline (Zoloft) 100 mg tablet Take 1.5 tablets (150 mg) by mouth once daily. 7/23/24 7/23/25  Andres Vega DO   apixaban (Eliquis) 2.5 mg tablet Take 1 tablet (2.5 mg) by mouth every 12 hours. 2/12/25 6/11/25  CYN Pacheco-CNS   hydrOXYzine HCL (Atarax) 25 mg tablet Take 1 tablet (25 mg) by mouth once daily at  "bedtime. 7/23/24 6/11/25  Andres Vega DO   polyethylene glycol (Glycolax, Miralax) 17 gram packet Take 17 g by mouth once daily as needed (for constipation). 2/12/25 6/11/25  Yesika Pretty, APRN-CNS        A ten-point review of systems was completed and is otherwise negative except for what is mentioned in the HPI above.    Physical Exam  Vitals reviewed.   Constitutional:       Appearance: Normal appearance.   HENT:      Head: Normocephalic.      Mouth/Throat:      Mouth: Mucous membranes are moist.   Eyes:      Pupils: Pupils are equal, round, and reactive to light.      Comments: Corrective lenses in use   Cardiovascular:      Rate and Rhythm: Normal rate and regular rhythm.   Pulmonary:      Effort: Pulmonary effort is normal.      Breath sounds: Normal breath sounds.   Abdominal:      General: Bowel sounds are normal.   Musculoskeletal:         General: Normal range of motion.   Skin:     General: Skin is warm and dry.   Neurological:      Mental Status: She is alert and oriented to person, place, and time.   Psychiatric:         Mood and Affect: Mood normal.          PAT AIRWAY:   Airway:     Mallampati::  I    TM distance::  >3 FB    Neck ROM::  Full  normal        Testing/Diagnostic: CBC, MBP, A1c, coag, T&S, staph/MRSA, ecg    Patient Specialist/PCP: Harvinder Hussein DO (PCP) 2/2025; Cheikh Jones MD (cardiology) 6/11/2025    Visit Vitals  /73   Pulse 72   Temp 36.4 °C (97.5 °F)   Resp 16   Ht 1.676 m (5' 6\")   Wt 61 kg (134 lb 7.7 oz)   SpO2 98%   BMI 21.71 kg/m²   OB Status Postmenopausal   Smoking Status Never   BSA 1.69 m²       DASI Risk Score      Flowsheet Row Pre-Admission Testing from 6/11/2025 in [facility] Pre-Admission Testing from 2/3/2025 in [facility]   Can you take care of yourself (eat, dress, bathe, or use toilet)?  2.75 filed at 06/11/2025 1357 2.75 filed at 02/03/2025 1335   Can you walk indoors, such as around your house? 1.75 filed at 06/11/2025 1357 0 " filed at 02/03/2025 1335   Can you walk a block or two on level ground?  0 filed at 06/11/2025 1357 0 filed at 02/03/2025 1335   Can you climb a flight of stairs or walk up a hill? 0 filed at 06/11/2025 1357 0 filed at 02/03/2025 1335   Can you run a short distance? 0 filed at 06/11/2025 1357 0 filed at 02/03/2025 1335   Can you do light work around the house like dusting or washing dishes? 2.7 filed at 06/11/2025 1357 2.7 filed at 02/03/2025 1335   Can you do moderate work around the house like vacuuming, sweeping floors or carrying groceries? 3.5 filed at 06/11/2025 1357 0 filed at 02/03/2025 1335   Can you do heavy work around the house like scrubbing floors or lifting and moving heavy furniture?  0 filed at 06/11/2025 1357 0 filed at 02/03/2025 1335   Can you do yard work like raking leaves, weeding or pushing a mower? 4.5 filed at 06/11/2025 1357 0 filed at 02/03/2025 1335   Can you have sexual relations? 5.25 filed at 06/11/2025 1357 5.25 filed at 02/03/2025 1335   Can you participate in moderate recreational activities like golf, bowling, dancing, doubles tennis or throwing a baseball or football? 0 filed at 06/11/2025 1357 0 filed at 02/03/2025 1335   Can you participate in strenous sports like swimming, singles tennis, football, basketball, or skiing? 0 filed at 06/11/2025 1357 0 filed at 02/03/2025 1335   DASI SCORE 20.45 filed at 06/11/2025 1357 10.7 filed at 02/03/2025 1335   METS Score (Will be calculated only when all the questions are answered) 5.3 filed at 06/11/2025 1357 4.1 filed at 02/03/2025 5071          Mohiti DVT Assessment      Flowsheet Row Admission (Discharged) from 2/11/2025 in [facility] 2 with Neo Brooke MD   DVT Score (IF A SCORE IS NOT CALCULATING, MUST SELECT A BMI TO COMPLETE) 7 filed at 02/11/2025 0976   Surgical Factors Elective major lower extremity arthroplasty filed at 02/11/2025 0917   BMI (BMI MUST BE CHOSEN) 30 or less filed at 02/11/2025 0917           Modified Frailty Index    No data to display       [id number] Stroke Risk Points  Current as of just now        N/A 0 to 9 Points:      Last Change: N/A          The [id number]-[id number] risk score (Lip CARLOS, et al. 2009. © 2010 [employer]) quantifies the risk of stroke for a patient with atrial fibrillation. For patients without atrial fibrillation or under the age of 18 this score appears as N/A. Higher score values generally indicate higher risk of stroke.        This score is not applicable to this patient. Components are not calculated.          Revised Cardiac Risk Index    No data to display       Apfel Simplified Score    No data to display       Risk Analysis Index Results This Encounter    No data found in the last 10 encounters.       Stop Bang Score      Flowsheet Row Pre-Admission Testing from 6/11/2025 in [facility] Pre-Admission Testing from 2/3/2025 in [facility]   Do you snore loudly? 1 filed at 06/11/2025 1347 0 filed at 02/03/2025 1335   Do you often feel tired or fatigued after your sleep? 1 filed at 06/11/2025 1347 0 filed at 02/03/2025 1335   Has anyone ever observed you stop breathing in your sleep? 1 filed at 06/11/2025 1347 1 filed at 02/03/2025 1335   Do you have or are you being treated for high blood pressure? 1 filed at 06/11/2025 1347 1 filed at 02/03/2025 1335   Recent BMI (Calculated) 23.1 filed at 06/11/2025 1347 23.1 filed at 02/03/2025 1335   Is BMI greater than 35 kg/m2? 0=No filed at 06/11/2025 1347 0=No filed at 02/03/2025 1335   Age older than 50 years old? 1=Yes filed at 06/11/2025 1347 1=Yes filed at 02/03/2025 1335   Is your neck circumference greater than 17 inches (Male) or 16 inches (Female)? 1 filed at 06/11/2025 1347 --   Gender - Male 0=No filed at 06/11/2025 1347 0=No filed at 02/03/2025 1335   STOP-BANG Total Score 6 filed at 06/11/2025 1347 --          Prodigy: High Risk  Total Score: 0          ARISCAT Score for  Postoperative Pulmonary Complications      Flowsheet Row Pre-Admission Testing from 6/11/2025 in [facility] Pre-Admission Testing from 2/3/2025 in [facility]   Age Calculated Score 3 filed at 06/11/2025 1437 3 filed at 02/03/2025 1539   Preoperative SpO2 0 filed at 06/11/2025 1437 0 filed at 02/03/2025 1539   Respiratory infection in the last month Either upper or lower (i.e., URI, bronchitis, pneumonia), with fever and antibiotic treatment 0 filed at 06/11/2025 1437 0 filed at 02/03/2025 1539   Preoperative anemia (Hgb less than 10 g/dl) 0 filed at 06/11/2025 1437 0 filed at 02/03/2025 1539   Surgical incision  0 filed at 06/11/2025 1437 0 filed at 02/03/2025 1539   Duration of surgery  16 filed at 06/11/2025 1437 0 filed at 02/03/2025 1539   Emergency Procedure  0 filed at 06/11/2025 1437 0 filed at 02/03/2025 1539   ARISCAT Total Score  19 filed at 06/11/2025 1437 3 filed at 02/03/2025 1539          Rai Perioperative Risk for Myocardial Infarction or Cardiac Arrest (GAVIOTA)      Flowsheet Row Pre-Admission Testing from 6/11/2025 in [facility] Pre-Admission Testing from 2/3/2025 in [facility]   Calculated Age Score 1.18 filed at 06/11/2025 1437 1.18 filed at 02/03/2025 1540   Functional Status  0 filed at 06/11/2025 1437 0.65 filed at 02/03/2025 1540   ASA Class  -3.29 filed at 06/11/2025 1437 -3.29 filed at 02/03/2025 1540   Creatinine 0 filed at 06/11/2025 1437 0 filed at 02/03/2025 1540   Type of Procedure  0.80 filed at 06/11/2025 1437 0.80 filed at 02/03/2025 1540   GAVIOTA Total Score  -6.56 filed at 06/11/2025 1437 -5.91 filed at 02/03/2025 1540   GAVIOTA % 0.14 filed at 06/11/2025 1437 0.27 filed at 02/03/2025 1540            Assessment and Plan:     # Anxiety - continue sertraline  # CVA - (11/2021), has since recovered with no residual; followed by PCP  # PEDRO - unable to tolerate CPAP  #Seasonal allergies - has not needed to use inhaler for several months  #  HTN - continue metoprolol  # GERD - continue omeprazole, famotidine  # Right hip OA - Right total hip replacement anterior approach w/c-arm w/Dr Brooke on 6/24/2025    Ecg complete 6/11/2025 - NSR, possible Left atrial enlargement, nonspecific ST and T wave abnormality (62 bpm)  Medical hx, Allergies, VS and Labs reviewed  Medications addressed w/pre-op instructions provided    Cardiac clearance from Dr Jones visit 6/11/2025 pending    Echocardiogram 6/9/2023  CONCLUSIONS:  1. Left ventricular systolic function is normal with a 60-65% estimated ejection fraction.  2. Spectral Doppler shows an impaired relaxation pattern of left ventricular diastolic filling.  3. PFO closure device noted; device is well-seated.  4. There is no evidence of a patent foramen ovale.  5. RVSP within normal limits.         [1]   Past Medical History:  Diagnosis Date    Acute upper respiratory infection, unspecified 11/01/2021    Viral URI with cough    Anxiety     Arthritis     Asthma     Chronic bronchitis (Multi)     CVA (cerebral vascular accident) (Multi)     Dysphagia     Easy bruising     Effusion, unspecified knee 07/02/2021    Suprapatellar effusion of knee    Encounter for screening for malignant neoplasm of colon 05/06/2015    Screening for colon cancer    GERD (gastroesophageal reflux disease)     Hypertension     Joint pain     Pain in left knee 07/06/2021    Acute pain of left knee    Pain in left knee 09/07/2017    Left knee pain    Pain in left lower leg 07/02/2021    Pain of left calf    Pain in right finger(s)     Pain of right thumb    Pancreatitis (HHS-HCC)     Personal history of other diseases of the nervous system and sense organs 06/28/2016    History of labyrinthitis    Personal history of other diseases of the nervous system and sense organs     History of migraine    Personal history of other diseases of the respiratory system 02/10/2021    History of pharyngitis    Personal history of other endocrine,  nutritional and metabolic disease     History of goiter    Personal history of other infectious and parasitic diseases     History of varicella    Personal history of other infectious and parasitic diseases     History of mumps    Personal history of other infectious and parasitic diseases 2021    History of herpes zoster    Personal history of other infectious and parasitic diseases 2021    History of viral infection    Personal history of other medical treatment     History of echocardiogram    Personal history of other medical treatment     History of cardiac monitoring    Personal history of other specified conditions 2021    History of right flank pain    PFO (patent foramen ovale) ([facility])         Pneumonia     Right upper quadrant pain 2015    RUQ abdominal pain    Segmental and somatic dysfunction of cervical region 2015    Segmental and somatic dysfunction of cervical region    Shortness of breath     Strain of other muscle(s) and tendon(s) at lower leg level, left leg, initial encounter 2021    Strain of calf muscle, left, initial encounter    Strain of unspecified muscle(s) and tendon(s) at lower leg level, left leg, initial encounter 2021    Strain of left knee, initial encounter    Unilateral primary osteoarthritis, left knee 10/20/2016    Osteoarthritis of left knee    Unspecified acute conjunctivitis, bilateral 2017    Acute conjunctivitis of both eyes, unspecified acute conjunctivitis type    Unspecified tear of unspecified meniscus, current injury, left knee, initial encounter 2017    Tear of meniscus of left knee    Urinary tract infection, site not specified 2021    Acute lower UTI    Vision loss    [2]   Past Surgical History:  Procedure Laterality Date    APPENDECTOMY  2015    Appendectomy    BELT ABDOMINOPLASTY  2015    Abdominoplasty    BREAST BIOPSY       SECTION, CLASSIC  2015     Section      SECTION, LOW TRANSVERSE      COLONOSCOPY      CT ANGIO NECK  2021    CT NECK ANGIO W AND WO IV CONTRAST 2021 DOCTOR OFFICE LEGACY    CT HEAD ANGIO W AND WO IV CONTRAST  2021    CT HEAD ANGIO W AND WO IV CONTRAST 2021 DOCTOR OFFICE LEGACY    ENDOMETRIAL ABLATION  2015    [facility]nec[facility] Services Thermal Endometrial Ablation    HIP ARTHROPLASTY      KNEE ARTHROPLASTY      OTHER SURGICAL HISTORY  2015    Breast Surgery Enlargement Procedure    OTHER SURGICAL HISTORY  2021    Knee replacement    OTHER SURGICAL HISTORY  2022    Patent foramen ovale repair    PATENT FORAMEN OVALE CLOSURE      OH BREAST AUGMENTATION WITH IMPLANT      UPPER GASTROINTESTINAL ENDOSCOPY     [3]   Family History  Problem Relation Name Age of Onset    Anxiety disorder Mother      Arthritis Mother      Arthritis Father          seasonal    Alcohol abuse Father          severe    Yun's esophagus Father      Breast cancer Sister      Diabetes Maternal Grandmother     [4] No Known Allergies

## 2025-06-13 LAB — STAPHYLOCOCCUS SPEC CULT: NORMAL

## 2025-06-17 ENCOUNTER — PREP FOR PROCEDURE (OUTPATIENT)
Dept: ORTHOPEDICS | Facility: HOSPITAL | Age: 60
End: 2025-06-17
Payer: COMMERCIAL

## 2025-06-17 RX ORDER — CEFAZOLIN SODIUM 2 G/50ML
2 SOLUTION INTRAVENOUS ONCE
Status: CANCELLED | OUTPATIENT
Start: 2025-06-24 | End: 2025-06-17

## 2025-06-17 RX ORDER — SODIUM CHLORIDE, SODIUM LACTATE, POTASSIUM CHLORIDE, CALCIUM CHLORIDE 600; 310; 30; 20 MG/100ML; MG/100ML; MG/100ML; MG/100ML
100 INJECTION, SOLUTION INTRAVENOUS CONTINUOUS
Status: CANCELLED | OUTPATIENT
Start: 2025-06-24 | End: 2025-06-25

## 2025-06-17 RX ORDER — SCOPOLAMINE 1 MG/3D
1 PATCH, EXTENDED RELEASE TRANSDERMAL
Status: CANCELLED | OUTPATIENT
Start: 2025-06-24 | End: 2025-06-27

## 2025-06-24 ENCOUNTER — ANESTHESIA (OUTPATIENT)
Dept: OPERATING ROOM | Facility: HOSPITAL | Age: 60
End: 2025-06-24
Payer: COMMERCIAL

## 2025-06-24 ENCOUNTER — ANESTHESIA EVENT (OUTPATIENT)
Dept: OPERATING ROOM | Facility: HOSPITAL | Age: 60
End: 2025-06-24
Payer: COMMERCIAL

## 2025-06-24 ENCOUNTER — HOSPITAL ENCOUNTER (OUTPATIENT)
Facility: HOSPITAL | Age: 60
Discharge: HOME HEALTH | End: 2025-06-25
Attending: ORTHOPAEDIC SURGERY | Admitting: ORTHOPAEDIC SURGERY
Payer: COMMERCIAL

## 2025-06-24 ENCOUNTER — APPOINTMENT (OUTPATIENT)
Dept: RADIOLOGY | Facility: HOSPITAL | Age: 60
End: 2025-06-24
Payer: COMMERCIAL

## 2025-06-24 DIAGNOSIS — M16.12 ARTHRITIS OF LEFT HIP: ICD-10-CM

## 2025-06-24 DIAGNOSIS — M16.11 PRIMARY OSTEOARTHRITIS OF RIGHT HIP: ICD-10-CM

## 2025-06-24 PROCEDURE — 2780000003 HC OR 278 NO HCPCS: Performed by: ORTHOPAEDIC SURGERY

## 2025-06-24 PROCEDURE — 2500000001 HC RX 250 WO HCPCS SELF ADMINISTERED DRUGS (ALT 637 FOR MEDICARE OP): Performed by: ANESTHESIOLOGY

## 2025-06-24 PROCEDURE — 7100000001 HC RECOVERY ROOM TIME - INITIAL BASE CHARGE: Performed by: ORTHOPAEDIC SURGERY

## 2025-06-24 PROCEDURE — 76000 FLUOROSCOPY <1 HR PHYS/QHP: CPT

## 2025-06-24 PROCEDURE — 2500000004 HC RX 250 GENERAL PHARMACY W/ HCPCS (ALT 636 FOR OP/ED)

## 2025-06-24 PROCEDURE — 3600000017 HC OR TIME - EACH INCREMENTAL 1 MINUTE - PROCEDURE LEVEL SIX: Performed by: ORTHOPAEDIC SURGERY

## 2025-06-24 PROCEDURE — 2500000004 HC RX 250 GENERAL PHARMACY W/ HCPCS (ALT 636 FOR OP/ED): Performed by: CLINICAL NURSE SPECIALIST

## 2025-06-24 PROCEDURE — 88304 TISSUE EXAM BY PATHOLOGIST: CPT | Performed by: PATHOLOGY

## 2025-06-24 PROCEDURE — C1776 JOINT DEVICE (IMPLANTABLE): HCPCS | Performed by: ORTHOPAEDIC SURGERY

## 2025-06-24 PROCEDURE — 2500000004 HC RX 250 GENERAL PHARMACY W/ HCPCS (ALT 636 FOR OP/ED): Mod: JZ

## 2025-06-24 PROCEDURE — 3700000001 HC GENERAL ANESTHESIA TIME - INITIAL BASE CHARGE: Performed by: ORTHOPAEDIC SURGERY

## 2025-06-24 PROCEDURE — 7100000011 HC EXTENDED STAY RECOVERY HOURLY - NURSING UNIT

## 2025-06-24 PROCEDURE — 7100000002 HC RECOVERY ROOM TIME - EACH INCREMENTAL 1 MINUTE: Performed by: ORTHOPAEDIC SURGERY

## 2025-06-24 PROCEDURE — 3700000002 HC GENERAL ANESTHESIA TIME - EACH INCREMENTAL 1 MINUTE: Performed by: ORTHOPAEDIC SURGERY

## 2025-06-24 PROCEDURE — A27130 PR TOTAL HIP ARTHROPLASTY: Performed by: ANESTHESIOLOGY

## 2025-06-24 PROCEDURE — A27130 PR TOTAL HIP ARTHROPLASTY: Performed by: NURSE ANESTHETIST, CERTIFIED REGISTERED

## 2025-06-24 PROCEDURE — 2500000002 HC RX 250 W HCPCS SELF ADMINISTERED DRUGS (ALT 637 FOR MEDICARE OP, ALT 636 FOR OP/ED)

## 2025-06-24 PROCEDURE — 2500000004 HC RX 250 GENERAL PHARMACY W/ HCPCS (ALT 636 FOR OP/ED): Performed by: ORTHOPAEDIC SURGERY

## 2025-06-24 PROCEDURE — 2500000005 HC RX 250 GENERAL PHARMACY W/O HCPCS: Performed by: ORTHOPAEDIC SURGERY

## 2025-06-24 PROCEDURE — 2500000004 HC RX 250 GENERAL PHARMACY W/ HCPCS (ALT 636 FOR OP/ED): Mod: JZ | Performed by: ANESTHESIOLOGY

## 2025-06-24 PROCEDURE — 3600000018 HC OR TIME - INITIAL BASE CHARGE - PROCEDURE LEVEL SIX: Performed by: ORTHOPAEDIC SURGERY

## 2025-06-24 PROCEDURE — 97161 PT EVAL LOW COMPLEX 20 MIN: CPT | Mod: GP

## 2025-06-24 PROCEDURE — 2500000001 HC RX 250 WO HCPCS SELF ADMINISTERED DRUGS (ALT 637 FOR MEDICARE OP): Performed by: CLINICAL NURSE SPECIALIST

## 2025-06-24 PROCEDURE — 2500000001 HC RX 250 WO HCPCS SELF ADMINISTERED DRUGS (ALT 637 FOR MEDICARE OP): Performed by: ORTHOPAEDIC SURGERY

## 2025-06-24 PROCEDURE — 2720000007 HC OR 272 NO HCPCS: Performed by: ORTHOPAEDIC SURGERY

## 2025-06-24 PROCEDURE — 88311 DECALCIFY TISSUE: CPT | Mod: TC,PARLAB | Performed by: ORTHOPAEDIC SURGERY

## 2025-06-24 PROCEDURE — A6213 FOAM DRG >16<=48 SQ IN W/BDR: HCPCS | Performed by: ORTHOPAEDIC SURGERY

## 2025-06-24 DEVICE — CORAIL HIP SYSTEM CEMENTLESS FEMORAL STEM 12/14 AMT 135 DEGREES KA SIZE 10 HA COATED STANDARD COLLAR
Type: IMPLANTABLE DEVICE | Site: HIP | Status: FUNCTIONAL
Brand: CORAIL

## 2025-06-24 DEVICE — PINNACLE HIP SOLUTIONS ALTRX POLYETHYLENE ACETABULAR LINER NEUTRAL 28MM ID 46MM OD
Type: IMPLANTABLE DEVICE | Site: HIP | Status: FUNCTIONAL
Brand: PINNACLE ALTRX

## 2025-06-24 DEVICE — BIOLOX DELTA CERAMIC FEMORAL HEAD 28MM DIA +1.5 12/14 TAPER
Type: IMPLANTABLE DEVICE | Site: HIP | Status: FUNCTIONAL
Brand: BIOLOX DELTA

## 2025-06-24 DEVICE — PINNACLE GRIPTION ACETABULAR SHELL BANTAM 46MM OD
Type: IMPLANTABLE DEVICE | Site: HIP | Status: FUNCTIONAL
Brand: PINNACLE GRIPTION

## 2025-06-24 RX ORDER — OXYCODONE HYDROCHLORIDE 5 MG/1
10 TABLET ORAL EVERY 6 HOURS PRN
Status: DISCONTINUED | OUTPATIENT
Start: 2025-06-24 | End: 2025-06-25 | Stop reason: HOSPADM

## 2025-06-24 RX ORDER — KETOROLAC TROMETHAMINE 30 MG/ML
30 INJECTION, SOLUTION INTRAMUSCULAR; INTRAVENOUS ONCE
Status: COMPLETED | OUTPATIENT
Start: 2025-06-24 | End: 2025-06-24

## 2025-06-24 RX ORDER — DIPHENHYDRAMINE HYDROCHLORIDE 50 MG/ML
12.5 INJECTION, SOLUTION INTRAMUSCULAR; INTRAVENOUS ONCE AS NEEDED
Status: DISCONTINUED | OUTPATIENT
Start: 2025-06-24 | End: 2025-06-24 | Stop reason: HOSPADM

## 2025-06-24 RX ORDER — FAMOTIDINE 20 MG/1
40 TABLET, FILM COATED ORAL DAILY
Status: DISCONTINUED | OUTPATIENT
Start: 2025-06-24 | End: 2025-06-24

## 2025-06-24 RX ORDER — TRANEXAMIC ACID 10 MG/ML
1000 INJECTION, SOLUTION INTRAVENOUS ONCE
Status: COMPLETED | OUTPATIENT
Start: 2025-06-24 | End: 2025-06-24

## 2025-06-24 RX ORDER — ACETAMINOPHEN 10 MG/ML
1000 INJECTION, SOLUTION INTRAVENOUS ONCE
Status: COMPLETED | OUTPATIENT
Start: 2025-06-24 | End: 2025-06-24

## 2025-06-24 RX ORDER — ACETAMINOPHEN 325 MG/1
650 TABLET ORAL EVERY 4 HOURS PRN
Status: DISCONTINUED | OUTPATIENT
Start: 2025-06-24 | End: 2025-06-24 | Stop reason: HOSPADM

## 2025-06-24 RX ORDER — SODIUM CHLORIDE, SODIUM LACTATE, POTASSIUM CHLORIDE, CALCIUM CHLORIDE 600; 310; 30; 20 MG/100ML; MG/100ML; MG/100ML; MG/100ML
100 INJECTION, SOLUTION INTRAVENOUS CONTINUOUS
Status: DISCONTINUED | OUTPATIENT
Start: 2025-06-24 | End: 2025-06-24 | Stop reason: HOSPADM

## 2025-06-24 RX ORDER — METOPROLOL SUCCINATE 50 MG/1
50 TABLET, EXTENDED RELEASE ORAL DAILY
Status: DISCONTINUED | OUTPATIENT
Start: 2025-06-24 | End: 2025-06-25 | Stop reason: HOSPADM

## 2025-06-24 RX ORDER — CEFAZOLIN SODIUM 2 G/50ML
2 SOLUTION INTRAVENOUS EVERY 8 HOURS
Status: COMPLETED | OUTPATIENT
Start: 2025-06-24 | End: 2025-06-25

## 2025-06-24 RX ORDER — PANTOPRAZOLE SODIUM 40 MG/1
40 TABLET, DELAYED RELEASE ORAL
Status: DISCONTINUED | OUTPATIENT
Start: 2025-06-25 | End: 2025-06-25 | Stop reason: HOSPADM

## 2025-06-24 RX ORDER — SODIUM CHLORIDE, SODIUM LACTATE, POTASSIUM CHLORIDE, CALCIUM CHLORIDE 600; 310; 30; 20 MG/100ML; MG/100ML; MG/100ML; MG/100ML
100 INJECTION, SOLUTION INTRAVENOUS CONTINUOUS
Status: ACTIVE | OUTPATIENT
Start: 2025-06-24 | End: 2025-06-25

## 2025-06-24 RX ORDER — AZELASTINE 1 MG/ML
2 SPRAY, METERED NASAL 2 TIMES DAILY
Status: DISCONTINUED | OUTPATIENT
Start: 2025-06-24 | End: 2025-06-25 | Stop reason: HOSPADM

## 2025-06-24 RX ORDER — OXYCODONE HYDROCHLORIDE 5 MG/1
5 TABLET ORAL EVERY 6 HOURS PRN
Status: DISCONTINUED | OUTPATIENT
Start: 2025-06-24 | End: 2025-06-25 | Stop reason: HOSPADM

## 2025-06-24 RX ORDER — GABAPENTIN 300 MG/1
300 CAPSULE ORAL ONCE
Status: COMPLETED | OUTPATIENT
Start: 2025-06-24 | End: 2025-06-24

## 2025-06-24 RX ORDER — ACETAMINOPHEN 325 MG/1
650 TABLET ORAL EVERY 6 HOURS SCHEDULED
Status: DISCONTINUED | OUTPATIENT
Start: 2025-06-25 | End: 2025-06-25 | Stop reason: HOSPADM

## 2025-06-24 RX ORDER — APREPITANT 40 MG/1
CAPSULE ORAL
Status: COMPLETED
Start: 2025-06-24 | End: 2025-06-24

## 2025-06-24 RX ORDER — DOCUSATE SODIUM 100 MG/1
100 CAPSULE, LIQUID FILLED ORAL 2 TIMES DAILY
Status: DISCONTINUED | OUTPATIENT
Start: 2025-06-24 | End: 2025-06-25 | Stop reason: HOSPADM

## 2025-06-24 RX ORDER — SODIUM CHLORIDE, SODIUM LACTATE, POTASSIUM CHLORIDE, CALCIUM CHLORIDE 600; 310; 30; 20 MG/100ML; MG/100ML; MG/100ML; MG/100ML
75 INJECTION, SOLUTION INTRAVENOUS CONTINUOUS
Status: DISCONTINUED | OUTPATIENT
Start: 2025-06-24 | End: 2025-06-25 | Stop reason: HOSPADM

## 2025-06-24 RX ORDER — CELECOXIB 100 MG/1
100 CAPSULE ORAL ONCE
Status: COMPLETED | OUTPATIENT
Start: 2025-06-24 | End: 2025-06-24

## 2025-06-24 RX ORDER — FENTANYL CITRATE 50 UG/ML
INJECTION, SOLUTION INTRAMUSCULAR; INTRAVENOUS AS NEEDED
Status: DISCONTINUED | OUTPATIENT
Start: 2025-06-24 | End: 2025-06-24

## 2025-06-24 RX ORDER — KETAMINE HCL IN NACL, ISO-OSM 100MG/10ML
SYRINGE (ML) INJECTION AS NEEDED
Status: DISCONTINUED | OUTPATIENT
Start: 2025-06-24 | End: 2025-06-24

## 2025-06-24 RX ORDER — KETOROLAC TROMETHAMINE 15 MG/ML
30 INJECTION, SOLUTION INTRAMUSCULAR; INTRAVENOUS ONCE
Status: DISCONTINUED | OUTPATIENT
Start: 2025-06-24 | End: 2025-06-24

## 2025-06-24 RX ORDER — KETOROLAC TROMETHAMINE 30 MG/ML
15 INJECTION, SOLUTION INTRAMUSCULAR; INTRAVENOUS EVERY 8 HOURS SCHEDULED
Status: COMPLETED | OUTPATIENT
Start: 2025-06-24 | End: 2025-06-25

## 2025-06-24 RX ORDER — DEXMEDETOMIDINE IN 0.9 % NACL 20 MCG/5ML
SYRINGE (ML) INTRAVENOUS AS NEEDED
Status: DISCONTINUED | OUTPATIENT
Start: 2025-06-24 | End: 2025-06-24

## 2025-06-24 RX ORDER — FAMOTIDINE 20 MG/1
40 TABLET, FILM COATED ORAL NIGHTLY
Status: DISCONTINUED | OUTPATIENT
Start: 2025-06-24 | End: 2025-06-25 | Stop reason: HOSPADM

## 2025-06-24 RX ORDER — CEFAZOLIN SODIUM 2 G/50ML
2 SOLUTION INTRAVENOUS ONCE
Status: COMPLETED | OUTPATIENT
Start: 2025-06-24 | End: 2025-06-24

## 2025-06-24 RX ORDER — ONDANSETRON HYDROCHLORIDE 2 MG/ML
4 INJECTION, SOLUTION INTRAVENOUS EVERY 8 HOURS PRN
Status: DISCONTINUED | OUTPATIENT
Start: 2025-06-24 | End: 2025-06-25 | Stop reason: HOSPADM

## 2025-06-24 RX ORDER — MEPERIDINE HYDROCHLORIDE 50 MG/ML
12.5 INJECTION INTRAMUSCULAR; INTRAVENOUS; SUBCUTANEOUS EVERY 10 MIN PRN
Status: DISCONTINUED | OUTPATIENT
Start: 2025-06-24 | End: 2025-06-24 | Stop reason: HOSPADM

## 2025-06-24 RX ORDER — POLYETHYLENE GLYCOL 3350 17 G/17G
17 POWDER, FOR SOLUTION ORAL DAILY
Status: DISCONTINUED | OUTPATIENT
Start: 2025-06-24 | End: 2025-06-25 | Stop reason: HOSPADM

## 2025-06-24 RX ORDER — SODIUM CHLORIDE 0.9 G/100ML
INJECTION, SOLUTION IRRIGATION AS NEEDED
Status: DISCONTINUED | OUTPATIENT
Start: 2025-06-24 | End: 2025-06-24 | Stop reason: HOSPADM

## 2025-06-24 RX ORDER — CEFAZOLIN SODIUM 2 G/50ML
2 SOLUTION INTRAVENOUS ONCE
Status: DISCONTINUED | OUTPATIENT
Start: 2025-06-24 | End: 2025-06-24 | Stop reason: SDUPTHER

## 2025-06-24 RX ORDER — ACETAMINOPHEN 325 MG/1
650 TABLET ORAL ONCE
Status: COMPLETED | OUTPATIENT
Start: 2025-06-24 | End: 2025-06-24

## 2025-06-24 RX ORDER — KETOROLAC TROMETHAMINE 30 MG/ML
INJECTION, SOLUTION INTRAMUSCULAR; INTRAVENOUS
Status: DISCONTINUED
Start: 2025-06-24 | End: 2025-06-24 | Stop reason: WASHOUT

## 2025-06-24 RX ORDER — HYDROMORPHONE HYDROCHLORIDE 1 MG/ML
1 INJECTION, SOLUTION INTRAMUSCULAR; INTRAVENOUS; SUBCUTANEOUS EVERY 5 MIN PRN
Status: DISCONTINUED | OUTPATIENT
Start: 2025-06-24 | End: 2025-06-24 | Stop reason: HOSPADM

## 2025-06-24 RX ORDER — APREPITANT 40 MG/1
40 CAPSULE ORAL ONCE
Status: COMPLETED | OUTPATIENT
Start: 2025-06-24 | End: 2025-06-24

## 2025-06-24 RX ORDER — PROPOFOL 10 MG/ML
INJECTION, EMULSION INTRAVENOUS AS NEEDED
Status: DISCONTINUED | OUTPATIENT
Start: 2025-06-24 | End: 2025-06-24

## 2025-06-24 RX ORDER — ONDANSETRON 4 MG/1
4 TABLET, FILM COATED ORAL EVERY 8 HOURS PRN
Status: DISCONTINUED | OUTPATIENT
Start: 2025-06-24 | End: 2025-06-25 | Stop reason: HOSPADM

## 2025-06-24 RX ORDER — SCOPOLAMINE 1 MG/3D
1 PATCH, EXTENDED RELEASE TRANSDERMAL ONCE
Status: DISCONTINUED | OUTPATIENT
Start: 2025-06-24 | End: 2025-06-24 | Stop reason: SDUPTHER

## 2025-06-24 RX ORDER — SCOPOLAMINE 1 MG/3D
1 PATCH, EXTENDED RELEASE TRANSDERMAL
Status: DISCONTINUED | OUTPATIENT
Start: 2025-06-24 | End: 2025-06-24

## 2025-06-24 RX ORDER — PHENYLEPHRINE HCL IN 0.9% NACL 1 MG/10 ML
SYRINGE (ML) INTRAVENOUS AS NEEDED
Status: DISCONTINUED | OUTPATIENT
Start: 2025-06-24 | End: 2025-06-24

## 2025-06-24 RX ORDER — LIDOCAINE HCL/PF 100 MG/5ML
SYRINGE (ML) INTRAVENOUS AS NEEDED
Status: DISCONTINUED | OUTPATIENT
Start: 2025-06-24 | End: 2025-06-24

## 2025-06-24 RX ORDER — LABETALOL HYDROCHLORIDE 5 MG/ML
10 INJECTION, SOLUTION INTRAVENOUS ONCE AS NEEDED
Status: DISCONTINUED | OUTPATIENT
Start: 2025-06-24 | End: 2025-06-24 | Stop reason: HOSPADM

## 2025-06-24 RX ORDER — HYDRALAZINE HYDROCHLORIDE 20 MG/ML
10 INJECTION INTRAMUSCULAR; INTRAVENOUS EVERY 30 MIN PRN
Status: DISCONTINUED | OUTPATIENT
Start: 2025-06-24 | End: 2025-06-24 | Stop reason: HOSPADM

## 2025-06-24 RX ORDER — CYCLOBENZAPRINE HCL 10 MG
5 TABLET ORAL 3 TIMES DAILY PRN
Status: DISCONTINUED | OUTPATIENT
Start: 2025-06-24 | End: 2025-06-25 | Stop reason: HOSPADM

## 2025-06-24 RX ORDER — ROCURONIUM BROMIDE 10 MG/ML
INJECTION, SOLUTION INTRAVENOUS AS NEEDED
Status: DISCONTINUED | OUTPATIENT
Start: 2025-06-24 | End: 2025-06-24

## 2025-06-24 RX ORDER — ONDANSETRON HYDROCHLORIDE 2 MG/ML
INJECTION, SOLUTION INTRAVENOUS AS NEEDED
Status: DISCONTINUED | OUTPATIENT
Start: 2025-06-24 | End: 2025-06-24

## 2025-06-24 RX ORDER — MIDAZOLAM HYDROCHLORIDE 1 MG/ML
INJECTION, SOLUTION INTRAMUSCULAR; INTRAVENOUS AS NEEDED
Status: DISCONTINUED | OUTPATIENT
Start: 2025-06-24 | End: 2025-06-24

## 2025-06-24 RX ORDER — ONDANSETRON HYDROCHLORIDE 2 MG/ML
4 INJECTION, SOLUTION INTRAVENOUS ONCE AS NEEDED
Status: DISCONTINUED | OUTPATIENT
Start: 2025-06-24 | End: 2025-06-24 | Stop reason: HOSPADM

## 2025-06-24 RX ADMIN — CEFAZOLIN SODIUM 2 G: 2 SOLUTION INTRAVENOUS at 18:18

## 2025-06-24 RX ADMIN — Medication 100 MCG: at 10:59

## 2025-06-24 RX ADMIN — SODIUM CHLORIDE, SODIUM LACTATE, POTASSIUM CHLORIDE, AND CALCIUM CHLORIDE: .6; .31; .03; .02 INJECTION, SOLUTION INTRAVENOUS at 10:49

## 2025-06-24 RX ADMIN — KETOROLAC TROMETHAMINE 15 MG: 30 INJECTION, SOLUTION INTRAMUSCULAR at 21:43

## 2025-06-24 RX ADMIN — Medication 100 MCG: at 11:10

## 2025-06-24 RX ADMIN — HYDROMORPHONE HYDROCHLORIDE 1 MG: 1 INJECTION, SOLUTION INTRAMUSCULAR; INTRAVENOUS; SUBCUTANEOUS at 13:10

## 2025-06-24 RX ADMIN — SCOPOLAMINE 1 PATCH: 1.5 PATCH, EXTENDED RELEASE TRANSDERMAL at 09:38

## 2025-06-24 RX ADMIN — Medication 30 MG: at 10:57

## 2025-06-24 RX ADMIN — Medication 100 MCG: at 11:07

## 2025-06-24 RX ADMIN — Medication 100 MCG: at 11:04

## 2025-06-24 RX ADMIN — APREPITANT 40 MG: 40 CAPSULE ORAL at 09:44

## 2025-06-24 RX ADMIN — CYCLOBENZAPRINE 5 MG: 10 TABLET, FILM COATED ORAL at 18:17

## 2025-06-24 RX ADMIN — POVIDONE-IODINE 1 APPLICATION: 5 SOLUTION TOPICAL at 09:33

## 2025-06-24 RX ADMIN — DOCUSATE SODIUM 100 MG: 100 CAPSULE, LIQUID FILLED ORAL at 21:43

## 2025-06-24 RX ADMIN — OXYCODONE HYDROCHLORIDE 10 MG: 5 TABLET ORAL at 22:48

## 2025-06-24 RX ADMIN — TRANEXAMIC ACID 1000 MG: 10 INJECTION, SOLUTION INTRAVENOUS at 12:04

## 2025-06-24 RX ADMIN — Medication 12 MCG: at 10:52

## 2025-06-24 RX ADMIN — ROCURONIUM BROMIDE 60 MG: 50 INJECTION, SOLUTION INTRAVENOUS at 10:54

## 2025-06-24 RX ADMIN — Medication 100 MCG: at 10:57

## 2025-06-24 RX ADMIN — CELECOXIB 100 MG: 100 CAPSULE ORAL at 09:37

## 2025-06-24 RX ADMIN — LIDOCAINE HYDROCHLORIDE 60 MG: 20 INJECTION INTRAVENOUS at 10:53

## 2025-06-24 RX ADMIN — MIDAZOLAM 2 MG: 1 INJECTION INTRAMUSCULAR; INTRAVENOUS at 10:53

## 2025-06-24 RX ADMIN — ROCURONIUM BROMIDE 10 MG: 50 INJECTION, SOLUTION INTRAVENOUS at 11:23

## 2025-06-24 RX ADMIN — CEFAZOLIN SODIUM 2 G: 2 SOLUTION INTRAVENOUS at 10:56

## 2025-06-24 RX ADMIN — Medication 100 MCG: at 11:57

## 2025-06-24 RX ADMIN — DEXAMETHASONE SODIUM PHOSPHATE 8 MG: 4 INJECTION, SOLUTION INTRAMUSCULAR; INTRAVENOUS at 11:07

## 2025-06-24 RX ADMIN — PROPOFOL 150 MG: 10 INJECTION, EMULSION INTRAVENOUS at 10:53

## 2025-06-24 RX ADMIN — ACETAMINOPHEN 650 MG: 325 TABLET ORAL at 09:38

## 2025-06-24 RX ADMIN — SUGAMMADEX 200 MG: 100 INJECTION, SOLUTION INTRAVENOUS at 12:22

## 2025-06-24 RX ADMIN — OXYCODONE HYDROCHLORIDE 10 MG: 5 TABLET ORAL at 16:14

## 2025-06-24 RX ADMIN — HYDROMORPHONE HYDROCHLORIDE 0.5 MG: 1 INJECTION, SOLUTION INTRAMUSCULAR; INTRAVENOUS; SUBCUTANEOUS at 12:50

## 2025-06-24 RX ADMIN — AZELASTINE HYDROCHLORIDE 2 SPRAY: 137 SPRAY, METERED NASAL at 21:43

## 2025-06-24 RX ADMIN — ONDANSETRON 4 MG: 2 INJECTION INTRAMUSCULAR; INTRAVENOUS at 12:02

## 2025-06-24 RX ADMIN — TRANEXAMIC ACID 1000 MG: 10 INJECTION, SOLUTION INTRAVENOUS at 11:06

## 2025-06-24 RX ADMIN — SODIUM CHLORIDE, SODIUM LACTATE, POTASSIUM CHLORIDE, AND CALCIUM CHLORIDE 100 ML/HR: .6; .31; .03; .02 INJECTION, SOLUTION INTRAVENOUS at 09:42

## 2025-06-24 RX ADMIN — ACETAMINOPHEN 1000 MG: 10 INJECTION INTRAVENOUS at 14:17

## 2025-06-24 RX ADMIN — Medication 150 MCG: at 11:22

## 2025-06-24 RX ADMIN — HYDROMORPHONE HYDROCHLORIDE 0.5 MG: 1 INJECTION, SOLUTION INTRAMUSCULAR; INTRAVENOUS; SUBCUTANEOUS at 13:59

## 2025-06-24 RX ADMIN — SODIUM CHLORIDE, SODIUM LACTATE, POTASSIUM CHLORIDE, AND CALCIUM CHLORIDE: .6; .31; .03; .02 INJECTION, SOLUTION INTRAVENOUS at 12:09

## 2025-06-24 RX ADMIN — Medication 150 MCG: at 11:16

## 2025-06-24 RX ADMIN — SODIUM CHLORIDE, SODIUM LACTATE, POTASSIUM CHLORIDE, AND CALCIUM CHLORIDE 75 ML/HR: .6; .31; .03; .02 INJECTION, SOLUTION INTRAVENOUS at 17:04

## 2025-06-24 RX ADMIN — FENTANYL CITRATE 100 MCG: 50 INJECTION, SOLUTION INTRAMUSCULAR; INTRAVENOUS at 10:53

## 2025-06-24 RX ADMIN — Medication 20 MCG: at 12:15

## 2025-06-24 RX ADMIN — GABAPENTIN 300 MG: 300 CAPSULE ORAL at 09:38

## 2025-06-24 RX ADMIN — Medication 12 MCG: at 12:32

## 2025-06-24 RX ADMIN — KETOROLAC TROMETHAMINE 30 MG: 30 INJECTION, SOLUTION INTRAMUSCULAR at 14:17

## 2025-06-24 RX ADMIN — Medication 100 MCG: at 11:38

## 2025-06-24 RX ADMIN — Medication 100 MCG: at 11:59

## 2025-06-24 SDOH — SOCIAL STABILITY: SOCIAL INSECURITY: HAVE YOU HAD ANY THOUGHTS OF HARMING ANYONE ELSE?: NO

## 2025-06-24 SDOH — ECONOMIC STABILITY: FOOD INSECURITY: WITHIN THE PAST 12 MONTHS, THE FOOD YOU BOUGHT JUST DIDN'T LAST AND YOU DIDN'T HAVE MONEY TO GET MORE.: NEVER TRUE

## 2025-06-24 SDOH — ECONOMIC STABILITY: HOUSING INSECURITY: AT ANY TIME IN THE PAST 12 MONTHS, WERE YOU HOMELESS OR LIVING IN A SHELTER (INCLUDING NOW)?: NO

## 2025-06-24 SDOH — SOCIAL STABILITY: SOCIAL INSECURITY: WITHIN THE LAST YEAR, HAVE YOU BEEN HUMILIATED OR EMOTIONALLY ABUSED IN OTHER WAYS BY YOUR PARTNER OR EX-PARTNER?: NO

## 2025-06-24 SDOH — ECONOMIC STABILITY: HOUSING INSECURITY: IN THE PAST 12 MONTHS, HOW MANY TIMES HAVE YOU MOVED WHERE YOU WERE LIVING?: 0

## 2025-06-24 SDOH — SOCIAL STABILITY: SOCIAL INSECURITY: DO YOU FEEL ANYONE HAS EXPLOITED OR TAKEN ADVANTAGE OF YOU FINANCIALLY OR OF YOUR PERSONAL PROPERTY?: NO

## 2025-06-24 SDOH — SOCIAL STABILITY: SOCIAL INSECURITY: DO YOU FEEL UNSAFE GOING BACK TO THE PLACE WHERE YOU ARE LIVING?: NO

## 2025-06-24 SDOH — SOCIAL STABILITY: SOCIAL INSECURITY: WITHIN THE LAST YEAR, HAVE YOU BEEN AFRAID OF YOUR PARTNER OR EX-PARTNER?: NO

## 2025-06-24 SDOH — SOCIAL STABILITY: SOCIAL INSECURITY: ABUSE: ADULT

## 2025-06-24 SDOH — SOCIAL STABILITY: SOCIAL INSECURITY: ARE THERE ANY APPARENT SIGNS OF INJURIES/BEHAVIORS THAT COULD BE RELATED TO ABUSE/NEGLECT?: NO

## 2025-06-24 SDOH — SOCIAL STABILITY: SOCIAL INSECURITY: DOES ANYONE TRY TO KEEP YOU FROM HAVING/CONTACTING OTHER FRIENDS OR DOING THINGS OUTSIDE YOUR HOME?: NO

## 2025-06-24 SDOH — ECONOMIC STABILITY: HOUSING INSECURITY: IN THE LAST 12 MONTHS, WAS THERE A TIME WHEN YOU WERE NOT ABLE TO PAY THE MORTGAGE OR RENT ON TIME?: NO

## 2025-06-24 SDOH — SOCIAL STABILITY: SOCIAL INSECURITY: ARE YOU OR HAVE YOU BEEN THREATENED OR ABUSED PHYSICALLY, EMOTIONALLY, OR SEXUALLY BY ANYONE?: NO

## 2025-06-24 SDOH — SOCIAL STABILITY: SOCIAL INSECURITY: HAVE YOU HAD THOUGHTS OF HARMING ANYONE ELSE?: NO

## 2025-06-24 SDOH — SOCIAL STABILITY: SOCIAL INSECURITY: HAS ANYONE EVER THREATENED TO HURT YOUR FAMILY OR YOUR PETS?: NO

## 2025-06-24 SDOH — HEALTH STABILITY: MENTAL HEALTH: CURRENT SMOKER: 0

## 2025-06-24 SDOH — ECONOMIC STABILITY: FOOD INSECURITY: WITHIN THE PAST 12 MONTHS, YOU WORRIED THAT YOUR FOOD WOULD RUN OUT BEFORE YOU GOT THE MONEY TO BUY MORE.: NEVER TRUE

## 2025-06-24 SDOH — ECONOMIC STABILITY: INCOME INSECURITY: IN THE PAST 12 MONTHS HAS THE ELECTRIC, GAS, OIL, OR WATER COMPANY THREATENED TO SHUT OFF SERVICES IN YOUR HOME?: NO

## 2025-06-24 SDOH — SOCIAL STABILITY: SOCIAL INSECURITY: WERE YOU ABLE TO COMPLETE ALL THE BEHAVIORAL HEALTH SCREENINGS?: YES

## 2025-06-24 SDOH — ECONOMIC STABILITY: TRANSPORTATION INSECURITY: IN THE PAST 12 MONTHS, HAS LACK OF TRANSPORTATION KEPT YOU FROM MEDICAL APPOINTMENTS OR FROM GETTING MEDICATIONS?: NO

## 2025-06-24 ASSESSMENT — COGNITIVE AND FUNCTIONAL STATUS - GENERAL
DRESSING REGULAR LOWER BODY CLOTHING: A LITTLE
WALKING IN HOSPITAL ROOM: TOTAL
MOVING FROM LYING ON BACK TO SITTING ON SIDE OF FLAT BED WITH BEDRAILS: A LITTLE
DRESSING REGULAR UPPER BODY CLOTHING: A LITTLE
WALKING IN HOSPITAL ROOM: A LITTLE
TURNING FROM BACK TO SIDE WHILE IN FLAT BAD: A LITTLE
HELP NEEDED FOR BATHING: A LITTLE
PATIENT BASELINE BEDBOUND: NO
MOVING TO AND FROM BED TO CHAIR: A LOT
TOILETING: A LITTLE
MOBILITY SCORE: 18
MOVING TO AND FROM BED TO CHAIR: A LITTLE
TURNING FROM BACK TO SIDE WHILE IN FLAT BAD: A LITTLE
DAILY ACTIVITIY SCORE: 20
CLIMB 3 TO 5 STEPS WITH RAILING: TOTAL
STANDING UP FROM CHAIR USING ARMS: A LITTLE
MOBILITY SCORE: 12
STANDING UP FROM CHAIR USING ARMS: TOTAL
CLIMB 3 TO 5 STEPS WITH RAILING: A LITTLE

## 2025-06-24 ASSESSMENT — ACTIVITIES OF DAILY LIVING (ADL)
GROOMING: INDEPENDENT
LACK_OF_TRANSPORTATION: NO
JUDGMENT_ADEQUATE_SAFELY_COMPLETE_DAILY_ACTIVITIES: YES
FEEDING YOURSELF: INDEPENDENT
LACK_OF_TRANSPORTATION: NO
WALKS IN HOME: INDEPENDENT
ADEQUATE_TO_COMPLETE_ADL: YES
TOILETING: NEEDS ASSISTANCE
HEARING - RIGHT EAR: FUNCTIONAL
PATIENT'S MEMORY ADEQUATE TO SAFELY COMPLETE DAILY ACTIVITIES?: YES
HEARING - LEFT EAR: FUNCTIONAL
BATHING: INDEPENDENT
DRESSING YOURSELF: INDEPENDENT

## 2025-06-24 ASSESSMENT — PAIN SCALES - GENERAL
PAINLEVEL_OUTOF10: 9
PAINLEVEL_OUTOF10: 8
PAINLEVEL_OUTOF10: 10 - WORST POSSIBLE PAIN
PAINLEVEL_OUTOF10: 5 - MODERATE PAIN
PAINLEVEL_OUTOF10: 9
PAINLEVEL_OUTOF10: 9
PAINLEVEL_OUTOF10: 5 - MODERATE PAIN
PAINLEVEL_OUTOF10: 9
PAINLEVEL_OUTOF10: 4
PAINLEVEL_OUTOF10: 5 - MODERATE PAIN
PAINLEVEL_OUTOF10: 0 - NO PAIN
PAINLEVEL_OUTOF10: 0 - NO PAIN
PAINLEVEL_OUTOF10: 10 - WORST POSSIBLE PAIN
PAINLEVEL_OUTOF10: 10 - WORST POSSIBLE PAIN
PAINLEVEL_OUTOF10: 9
PAINLEVEL_OUTOF10: 10 - WORST POSSIBLE PAIN

## 2025-06-24 ASSESSMENT — PAIN - FUNCTIONAL ASSESSMENT

## 2025-06-24 ASSESSMENT — LIFESTYLE VARIABLES
HOW OFTEN DO YOU HAVE 6 OR MORE DRINKS ON ONE OCCASION: NEVER
AUDIT-C TOTAL SCORE: 0
SUBSTANCE_ABUSE_PAST_12_MONTHS: NO
HOW MANY STANDARD DRINKS CONTAINING ALCOHOL DO YOU HAVE ON A TYPICAL DAY: PATIENT DOES NOT DRINK
SKIP TO QUESTIONS 9-10: 1
AUDIT-C TOTAL SCORE: 0
HOW OFTEN DO YOU HAVE A DRINK CONTAINING ALCOHOL: NEVER
PRESCIPTION_ABUSE_PAST_12_MONTHS: NO

## 2025-06-24 ASSESSMENT — PAIN DESCRIPTION - DESCRIPTORS
DESCRIPTORS: DISCOMFORT;ACHING
DESCRIPTORS: SHARP
DESCRIPTORS: ACHING

## 2025-06-24 ASSESSMENT — PATIENT HEALTH QUESTIONNAIRE - PHQ9
SUM OF ALL RESPONSES TO PHQ9 QUESTIONS 1 & 2: 0
1. LITTLE INTEREST OR PLEASURE IN DOING THINGS: NOT AT ALL
2. FEELING DOWN, DEPRESSED OR HOPELESS: NOT AT ALL

## 2025-06-24 NOTE — ANESTHESIA POSTPROCEDURE EVALUATION
Patient: Nicolasa Burger    Procedure Summary       Date: 06/24/25 Room / Location: [facility] OR 08 / Virtual [facility]    Anesthesia Start: 1049 Anesthesia Stop: 1234    Procedure: RIGHT TOTAL HIP REPLACEMENT ANTERIOR APPROACH WITH C-ARM (Right: Hip) Diagnosis:       Primary osteoarthritis of right hip      (RIGHT HIP ARTHRITIS)    Surgeons: Neo Brooke MD Responsible Provider: Woo Figueroa MD    Anesthesia Type: general ASA Status: 3            Anesthesia Type: general    Vitals Value Taken Time   BP 98/49 06/24/25 12:33   Temp 37.2 °C (99 °F) 06/24/25 12:32   Pulse 59 06/24/25 12:42   Resp 12 06/24/25 12:32   SpO2 99 % 06/24/25 12:42   Vitals shown include unfiled device data.    Anesthesia Post Evaluation    Patient location during evaluation: PACU  Patient participation: complete - patient participated  Level of consciousness: awake and alert  Pain management: adequate  Airway patency: patent  Cardiovascular status: acceptable  Respiratory status: acceptable  Hydration status: acceptable  Postoperative Nausea and Vomiting: none        No notable events documented.

## 2025-06-24 NOTE — ANESTHESIA PROCEDURE NOTES
Airway  Date/Time: 6/24/2025 10:54 AM  Reason: elective    Airway not difficult    Staffing  Performed: SRNA and CRNA   Authorized by: Woo Figueroa MD    Performed by: Jayda Ocampo  Patient location during procedure: OR    Patient Condition  Indications for airway management: anesthesia  Patient position: sniffing  MILS maintained throughout  Planned trial extubation  Sedation level: deep     Final Airway Details   Preoxygenated: yes  Final airway type: endotracheal airway  Successful airway: ETT  Cuffed: yes   Successful intubation technique: direct laryngoscopy  Adjuncts used in placement: intubating stylet  Endotracheal tube insertion site: oral  Blade: Jamie  Blade size: #3  ETT size (mm): 7.0  Cormack-Lehane Classification: grade IIa - partial view of glottis  Placement verified by: chest auscultation and capnometry   Measured from: lips  ETT to lips (cm): 21  Number of attempts at approach: 1  Number of other approaches attempted: 0

## 2025-06-24 NOTE — PROGRESS NOTES
Physical Therapy    Physical Therapy Evaluation    Patient Name: Nicolasa Burger  MRN: [id number]  Today's Date: 6/24/2025   Time Calculation  Start Time: 1554  Stop Time: 1611  Time Calculation (min): 17 min  211/211-A    Assessment/Plan   PT Assessment  PT Assessment Results: Decreased strength, Impaired balance  Rehab Prognosis: Good  Barriers to Discharge Home: Physical needs  Physical Needs: Stair navigation into home limited by function/safety  Evaluation/Treatment Tolerance: Patient tolerated treatment well  Medical Staff Made Aware: Yes  Strengths: Ability to acquire knowledge, Attitude of self  Barriers to Participation: Comorbidities  End of Session Communication: Bedside nurse  Assessment Comment: Pt tolerated session well. pt preformed tasks with assist to maintain safety. pt would benefit from low int therapy to aid in fall prevention and improve LE strength in order to return to PLOF.  End of Session Patient Position: Bed, 2 rail up, Alarm off, not on at start of session (call light in reach)  IP OR SWING BED PT PLAN  Inpatient or Swing Bed: Inpatient  PT Plan  Treatment/Interventions: Bed mobility, Transfer training, Gait training, Balance training, Strengthening, Therapeutic exercise, Therapeutic activity, Stair training  PT Plan: Ongoing PT  PT Frequency: Daily  PT Discharge Recommendations: Low intensity level of continued care  PT Recommended Transfer Status: Assist x1  PT - OK to Discharge: Yes (once medically cleared)    Subjective     Current Problem:  1. Primary osteoarthritis of right hip  Surgical Pathology Exam    Surgical Pathology Exam        Problem List[1]    General Visit Information:  General  Reason for Referral: Anterior R THR  Referred By: Edwardo  Past Medical History Relevant to Rehab: CVA, GERD, anxiety, depression, dysphagia, herpes zoster, HTN, HLD  Family/Caregiver Present: No  Prior to Session Communication: Bedside nurse  Patient Position Received: Bed, 2 rail up  General  Comment: pt agreeable to therapy    Home Living:  Home Living  Home Living Comments: lives with spouse and 20 y/o old dtr. 2 LUCA with rail. first floor set up. pt has WIS, chair, GBs, and HHS. pt has elevated toilet. has std bed. IND in ADLs/iADLs. amb with cane. owns waker. family drives    Prior Level of Function:  Prior Function Per Pt/Caregiver Report  Level of [place]: Independent with ADLs and functional transfers, Independent with homemaking with ambulation    Precautions:  Precautions  LE Weight Bearing Status: Weight Bearing as Tolerated  Medical Precautions: Fall precautions  Post-Surgical Precautions: Right hip precautions  Precautions Comment: Anterior hip precautions    Vital Signs: VSS     Objective     Pain:  Pain Assessment  0-10 (Numeric) Pain Score: 9  Pain Type: Surgical pain  Pain Location: Hip  Pain Orientation: Right    Cognition:  Cognition  Overall Cognitive Status: Within Functional Limits    General Assessments:         Sensation  Light Touch:  (R hip n/t)  Strength  Strength Comments: LE strength on operational LE not assessed formally d/t hip precautions; however pt able to preform functional tasks. LE strength on nonoperational leg was 4/5. LLE ROM WFL. RLE knee extension ROM decreased.                   Functional Assessments:     Bed Mobility  Bed Mobility: Yes  Bed Mobility 1  Bed Mobility 1: Supine to sitting, Sitting to supine  Bed Mobility Comments 1: minAx1; assist with RLE  Transfers  Transfer: Yes  Transfer 1  Transfer to 1: Bed  Technique 1: Stand to sit, Sit to stand  Transfer Device 1: Walker  Trials/Comments 1: completed with modAx1  Ambulation/Gait Training  Ambulation/Gait Training Performed: No (unsafe to attempt amb d/t RLE numbness; unable to fully extend RLE; and 9/10 pain)          Outcome Measures:     [facility] Basic Mobility  Turning from your back to your side while in a flat bed without using bedrails: None  Moving from lying on your back to sitting on the side  of a flat bed without using bedrails: A little  Moving to and from bed to chair (including a wheelchair): A lot  Standing up from a chair using your arms (e.g. wheelchair or bedside chair): Total  To walk in hospital room: Total  Climbing 3-5 steps with railing: Total  Basic Mobility - Total Score: 12                                                             Goals:  Encounter Problems       Encounter Problems (Active)       PT Problem       STG - Pt will perform a B LE ther ex program of 2-3 sets of 10  (Progressing)       Start:  06/24/25    Expected End:  07/08/25            STG - Pt will transition supine <> sitting with SBA (Progressing)       Start:  06/24/25    Expected End:  07/08/25            STG - Pt will transfer STS with SBA (Progressing)       Start:  06/24/25    Expected End:  07/08/25            STG - Pt will amb 100' using w/w with SBA  (Progressing)       Start:  06/24/25    Expected End:  07/08/25                 Education Documentation  Precautions, taught by Angela Boston PT at 6/24/2025  4:24 PM.  Learner: Patient  Readiness: Acceptance  Method: Explanation  Response: Verbalizes Understanding  Comment: PT POC; precautions    Mobility Training, taught by Angela Boston PT at 6/24/2025  4:24 PM.  Learner: Patient  Readiness: Acceptance  Method: Explanation  Response: Verbalizes Understanding  Comment: PT POC; precautions    Education Comments  No comments found.              [1]   Patient Active Problem List  Diagnosis    Allergic urticaria    Anxiety    Attention deficit hyperactivity disorder (ADHD)    Benign essential HTN    Delirium    CVA (cerebral vascular accident) (Multi)    Dermatophytosis    Dizziness    Dysphagia    Elevated LFTs    Fatigue    PEDRO (generalized anxiety disorder)    Gastrointestinal symptom    Loss of hair    Headache    Hyperlipidemia    Hypoxia    Insomnia    Myalgia    Numbness and tingling sensation of skin    Palpitations    Parosmia    PFO (patent foramen ovale)  ([facility])    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    Seasonal allergies    Obstructive sleep apnea syndrome    Sleep disorder breathing    Cervical paraspinal muscle spasm    Thyroid fullness    Tremor    Vitamin D deficiency    Chronic dyspnea    Cystocele, midline    Depressive disorder    GERD (gastroesophageal reflux disease)    History of endometrial ablation    History of uterine fibroid    Child attention deficit disorder    Right hip pain    Trigger finger of right thumb    Trigger finger of left thumb    Speech disturbance    Muscular atrophy    Mononeuropathy of right median nerve    Mononeuropathy of left median nerve    Arthritis of carpometacarpal (CMC) joint of right thumb    Arthritis of carpometacarpal (CMC) joint of left thumb    Impaired executive functioning    Chronic pain    Clouded consciousness    Diarrhea    Enterocolitis    Hypokalemia    Hypomagnesemia    Injury of kidney    Muscle weakness of upper extremity    Prolonged QT interval    Herpes zoster    Post-acute COVID-19 syndrome    Arthritis of left hip    Leg length discrepancy    Status post total replacement of left hip    Arthritis of right hip

## 2025-06-24 NOTE — OP NOTE
PRE OP DIAGNOSIS - OA RIGHT HIP    POST OP DIAGNOSIS- OA RIGHT HIP     PROCEDURE - RIGHT THR ANTERIOR    SURGEON- MORALES HERNÁNDEZ MD    ASSIST-    MAGDA    ANESTHESIA- GENERAL    BLOOD LOSS  200 CC    FINDINGS- SEVERE OA RT HIP    TOURNIQUET- NONE    DRAIN- NONE    IMPLANTS    STEM  STANDARD COLLAR CORAIL SIZE 10  CUP  PINNACLE GRIPTION SIZE  46  LINER NEUTRAL SIZE  28  HEAD   28 + 1.5 CERAMIC     OPERATIVE PROCEDURE    PT WAS TAKEN TO THE OPERATING ROOM AND PLACED SUPINE ON THE HANA TABLE. UNDER ADEQUATE GENERAL ANESTHESIA PT WAS PREPPED AND DRAPED IN A STERILE MANNER. ANTERIOR APPROACH WAS MADE TO THE RIGHT HIP STARTING JUST DISTAL AND LATERAL TO THE ANTERIOR SUPERIOR ILIAC SPINE AND COMING DISTALLY THROUGH THE SUBCUTANEOUS TISSUE TO THE FASCIA OVERLYING THE TENSOR FASCIA MARLENA.  THE FASCIA WAS INCISED WITH A 10 BLADE AND BLUNT FINGER DISSECTION WAS USED TO SEPARATE THE FASCIA FROM THE UNDERLYING MUSCLE.  A RETRACTOR WAS PLACED LATERAL TO THE FEMORAL NECK AND MEYERDING RETRACTORS WERE PLACED MEDIAL AND LATERAL.  THE CIRCUMFLEX VESSELS WERE CAUTERIZED.  THE ANTERIOR CAPSULE WAS CAUTERIZED AND CAPSULECTOMY WAS PERFORMED AND ADEQUATE RELEASE WAS ACHIEVED.  THE FEMORAL NECK CUT WAS MADE WITH AN OSCILLATING SAW AND A [place] RING CUT WAS MADE AND THE HEAD WAS REMOVED FROM THE ACETABULUM.  THE ACETABULUM WAS EXPOSED AND CAPSULECTOMY WAS PERFORMED AND WAS THEN REAMED WITH SPHERICAL REAMERS 1 MM LESS THAN THE ABOVE COMPONENT. A TRIAL OF THAT REAMER SIZE WAS USED AND THEN I PRESS FIT A CUP OF THAT SIZE IN 45 DEGREES OF ABDUCTION AND 15-20 DEGREES OF ANTEVERSION WITH GOOD PURCHASE AND FIXATION.  I PLACED A NEURTRAL LINER OF THE ABOVE HEAD SIZE.  WITH APPROPRIATE POSITIONING, RETRACTORS AND RELEASES A BOX CUTTING OSTEOTOME WAS USED TO MAKE AN ENTRY INTO THE FEMUR.  A RASP WAS USED AND THEN I BROACHED THE FEMUR UP TO THE SIZE OF THE ABOVE COMPONENT. A STANDARD NECK AND ABOVE SIZED HEAD WAS USED. THE HIP WAS REDUCED AND THERE  WAS GOOD RANGE OF MOTION, GOOD STABILITY AND GOOD LEG LENGTH SYMMETRY.  THE TRIAL COMPONENTS WERE REMOVED AND THE ABOVE SIZE LINER WAS PLACED AND I PRESS FIT THE ABOVE SIZE STEM.  I THEN PLACED THE ABOVE SIZE HEAD AND TAPPED GENTLY ON THE MARINELLI TAPER. THE HIP WAS THEN REDUCED AND AGAIN FOUND TO HAVE GOOD RANGE OF MOTION, GOOD STABILITY AND GOOD LEG LENGTH SYMMETRY.  THE WOUND WAS IRRIGATED AND THE FASCIA WAS CLOSED WITH A RUNNING 0-VICRYL STITCH.  THE SUBCUTANEOUS TISSUE WAS CLOSED WITH INTERRUPTED 2-0 VICRYL AND THE SKIN WAS CLOSED WITH STAPLES. A DRY STERILE DRESSING WAS PLACED AND THE PATIENT RETURNED TO THE RECOVERY ROOM IN SATISFACTORY CONDITION.    THE EXCELLENT ASSISTANCE OF CERTIFIED PA WAS NECESSARY FOR SUCCESSFUL OUTCOME OF THIS SURGERY INCLUDING PREP, EXPOSURE, RETRACTING AND CLOSURE    DICTATED BY DR. MORALES HERNÁNDEZ

## 2025-06-24 NOTE — CARE PLAN
Problem: Pain - Adult  Goal: Verbalizes/displays adequate comfort level or baseline comfort level  Outcome: Progressing     Problem: Safety - Adult  Goal: Free from fall injury  Outcome: Progressing     Problem: Discharge Planning  Goal: Discharge to home or other facility with appropriate resources  Outcome: Progressing     Problem: Chronic Conditions and Co-morbidities  Goal: Patient's chronic conditions and co-morbidity symptoms are monitored and maintained or improved  Outcome: Progressing     Problem: Nutrition  Goal: Nutrient intake appropriate for maintaining nutritional needs  Outcome: Progressing     Problem: Fall/Injury  Goal: Not fall by end of shift  Outcome: Progressing  Goal: Be free from injury by end of the shift  Outcome: Progressing  Goal: Verbalize understanding of personal risk factors for fall in the hospital  Outcome: Progressing  Goal: Verbalize understanding of risk factor reduction measures to prevent injury from fall in the home  Outcome: Progressing  Goal: Use assistive devices by end of the shift  Outcome: Progressing  Goal: Pace activities to prevent fatigue by end of the shift  Outcome: Progressing     Problem: Patient is at risk for or has history of falls  Goal: Patient will not sustain a fall as a result of home safety mitigation  Outcome: Progressing     Problem: Pain  Goal: Takes deep breaths with improved pain control throughout the shift  Outcome: Progressing  Goal: Turns in bed with improved pain control throughout the shift  Outcome: Progressing  Goal: Walks with improved pain control throughout the shift  Outcome: Progressing  Goal: Performs ADL's with improved pain control throughout shift  Outcome: Progressing  Goal: Participates in PT with improved pain control throughout the shift  Outcome: Progressing  Goal: Free from opioid side effects throughout the shift  Outcome: Progressing  Goal: Free from acute confusion related to pain meds throughout the shift  Outcome:  Progressing     Problem: Skin  Goal: Decreased wound size/increased tissue granulation at next dressing change  Outcome: Progressing  Goal: Participates in plan/prevention/treatment measures  Outcome: Progressing  Goal: Prevent/manage excess moisture  Outcome: Progressing  Goal: Prevent/minimize sheer/friction injuries  Outcome: Progressing  Goal: Promote/optimize nutrition  Outcome: Progressing  Goal: Promote skin healing  Outcome: Progressing

## 2025-06-24 NOTE — ANESTHESIA PREPROCEDURE EVALUATION
Patient: Nicolasa Burger    Procedure Information       Date/Time: 06/24/25 1030    Procedure: RIGHT TOTAL HIP REPLACEMENT ANTERIOR APPROACH WITH C-ARM (Right: Hip) - RIGHT TOTAL HIP REPLACEMENT ANTERIOR APPROACH EXTENDED RECOVERY    Location: [facility] / [facility]    Surgeons: Neo Brooke MD            Relevant Problems   Cardiac   (+) Benign essential HTN   (+) Hyperlipidemia   (+) PFO (patent foramen ovale) ([facility])   (+) Prolonged QT interval      Neuro   (+) Anxiety   (+) CVA (cerebral vascular accident) (Multi)   (+) Depressive disorder   (+) PEDRO (generalized anxiety disorder)   (+) Mononeuropathy of left median nerve   (+) Mononeuropathy of right median nerve      GI   (+) Dysphagia   (+) GERD (gastroesophageal reflux disease)      Liver   (+) Elevated LFTs      HEENT   (+) Seasonal allergies      ID   (+) Dermatophytosis   (+) Herpes zoster       Clinical information reviewed:   Tobacco  Allergies  Meds   Med Hx  Surg Hx  OB Status  Fam Hx          NPO Detail:  NPO/Void Status  Carbohydrate Drink Given Prior to Surgery? : N  Date of Last Liquid: 06/23/25  Time of Last Liquid: 2000  Date of Last Solid: 06/23/25  Time of Last Solid: 1730    Echocardiogram    CONCLUSIONS:  1. Left ventricular systolic function is normal with a 60-65% estimated ejection fraction.  2. Spectral Doppler shows an impaired relaxation pattern of left ventricular diastolic filling.  3. PFO closure device noted; device is well-seated.  4. There is no evidence of a patent foramen ovale.  5. RVSP within normal limits.  Physical Exam    Airway  Mallampati: II  TM distance: >3 FB  Neck ROM: full  Mouth opening: 3 or more finger widths     Cardiovascular - normal exam  Rhythm: regular  Rate: normal     Dental - normal exam     Pulmonary - normal exam   Abdominal            Anesthesia Plan    History of general anesthesia?: yes  History of complications of general anesthesia?: yes    ASA 3     general     The patient is not a  current smoker.    intravenous induction   Postoperative pain plan includes opioids.  Trial extubation is planned.  Anesthetic plan and risks discussed with patient.  Use of blood products discussed with patient who.    Plan discussed with CRNA.

## 2025-06-25 ENCOUNTER — PHARMACY VISIT (OUTPATIENT)
Dept: PHARMACY | Facility: CLINIC | Age: 60
End: 2025-06-25
Payer: COMMERCIAL

## 2025-06-25 VITALS
BODY MASS INDEX: 21.61 KG/M2 | SYSTOLIC BLOOD PRESSURE: 109 MMHG | OXYGEN SATURATION: 96 % | HEART RATE: 63 BPM | TEMPERATURE: 98.4 F | WEIGHT: 134.48 LBS | DIASTOLIC BLOOD PRESSURE: 71 MMHG | RESPIRATION RATE: 20 BRPM | HEIGHT: 66 IN

## 2025-06-25 LAB
ANION GAP SERPL CALC-SCNC: 15 MMOL/L (ref 10–20)
BUN SERPL-MCNC: 23 MG/DL (ref 6–23)
CALCIUM SERPL-MCNC: 9 MG/DL (ref 8.6–10.3)
CHLORIDE SERPL-SCNC: 101 MMOL/L (ref 98–107)
CO2 SERPL-SCNC: 26 MMOL/L (ref 21–32)
CREAT SERPL-MCNC: 0.73 MG/DL (ref 0.5–1.05)
EGFRCR SERPLBLD CKD-EPI 2021: >90 ML/MIN/1.73M*2
ERYTHROCYTE [DISTWIDTH] IN BLOOD BY AUTOMATED COUNT: 14.6 % (ref 11.5–14.5)
GLUCOSE SERPL-MCNC: 108 MG/DL (ref 74–99)
HCT VFR BLD AUTO: 40.6 % (ref 36–46)
HGB BLD-MCNC: 12.8 G/DL (ref 12–16)
MCH RBC QN AUTO: 29.1 PG (ref 26–34)
MCHC RBC AUTO-ENTMCNC: 31.5 G/DL (ref 32–36)
MCV RBC AUTO: 92 FL (ref 80–100)
NRBC BLD-RTO: 0 /100 WBCS (ref 0–0)
PLATELET # BLD AUTO: 365 X10*3/UL (ref 150–450)
POTASSIUM SERPL-SCNC: 4.6 MMOL/L (ref 3.5–5.3)
RBC # BLD AUTO: 4.4 X10*6/UL (ref 4–5.2)
SODIUM SERPL-SCNC: 137 MMOL/L (ref 136–145)
WBC # BLD AUTO: 12.7 X10*3/UL (ref 4.4–11.3)

## 2025-06-25 PROCEDURE — 97535 SELF CARE MNGMENT TRAINING: CPT | Mod: CO,GO

## 2025-06-25 PROCEDURE — 97116 GAIT TRAINING THERAPY: CPT | Mod: CQ,GP

## 2025-06-25 PROCEDURE — 2500000004 HC RX 250 GENERAL PHARMACY W/ HCPCS (ALT 636 FOR OP/ED): Performed by: ORTHOPAEDIC SURGERY

## 2025-06-25 PROCEDURE — 2500000001 HC RX 250 WO HCPCS SELF ADMINISTERED DRUGS (ALT 637 FOR MEDICARE OP): Performed by: CLINICAL NURSE SPECIALIST

## 2025-06-25 PROCEDURE — 99238 HOSP IP/OBS DSCHRG MGMT 30/<: CPT | Performed by: CLINICAL NURSE SPECIALIST

## 2025-06-25 PROCEDURE — 80048 BASIC METABOLIC PNL TOTAL CA: CPT | Performed by: CLINICAL NURSE SPECIALIST

## 2025-06-25 PROCEDURE — 2500000001 HC RX 250 WO HCPCS SELF ADMINISTERED DRUGS (ALT 637 FOR MEDICARE OP): Performed by: ORTHOPAEDIC SURGERY

## 2025-06-25 PROCEDURE — 97535 SELF CARE MNGMENT TRAINING: CPT | Mod: CQ,GP

## 2025-06-25 PROCEDURE — 2500000002 HC RX 250 W HCPCS SELF ADMINISTERED DRUGS (ALT 637 FOR MEDICARE OP, ALT 636 FOR OP/ED): Performed by: ORTHOPAEDIC SURGERY

## 2025-06-25 PROCEDURE — 36415 COLL VENOUS BLD VENIPUNCTURE: CPT | Performed by: CLINICAL NURSE SPECIALIST

## 2025-06-25 PROCEDURE — 97165 OT EVAL LOW COMPLEX 30 MIN: CPT | Mod: GO

## 2025-06-25 PROCEDURE — RXMED WILLOW AMBULATORY MEDICATION CHARGE

## 2025-06-25 PROCEDURE — 2500000004 HC RX 250 GENERAL PHARMACY W/ HCPCS (ALT 636 FOR OP/ED): Mod: JW | Performed by: CLINICAL NURSE SPECIALIST

## 2025-06-25 PROCEDURE — 85027 COMPLETE CBC AUTOMATED: CPT | Performed by: ORTHOPAEDIC SURGERY

## 2025-06-25 PROCEDURE — 7100000011 HC EXTENDED STAY RECOVERY HOURLY - NURSING UNIT

## 2025-06-25 PROCEDURE — 99221 1ST HOSP IP/OBS SF/LOW 40: CPT | Performed by: STUDENT IN AN ORGANIZED HEALTH CARE EDUCATION/TRAINING PROGRAM

## 2025-06-25 PROCEDURE — 97110 THERAPEUTIC EXERCISES: CPT | Mod: CQ,GP

## 2025-06-25 RX ORDER — OXYCODONE HYDROCHLORIDE 5 MG/1
5-10 TABLET ORAL EVERY 6 HOURS PRN
Qty: 56 TABLET | Refills: 0 | Status: SHIPPED | OUTPATIENT
Start: 2025-06-25 | End: 2025-07-02

## 2025-06-25 RX ORDER — DOCUSATE SODIUM 100 MG/1
100 CAPSULE, LIQUID FILLED ORAL 2 TIMES DAILY
Qty: 28 CAPSULE | Refills: 0 | Status: SHIPPED | OUTPATIENT
Start: 2025-06-25 | End: 2025-07-09

## 2025-06-25 RX ORDER — CYCLOBENZAPRINE HCL 5 MG
5 TABLET ORAL 3 TIMES DAILY PRN
Qty: 15 TABLET | Refills: 0 | Status: SHIPPED | OUTPATIENT
Start: 2025-06-25 | End: 2025-06-30

## 2025-06-25 RX ORDER — POLYETHYLENE GLYCOL 3350 17 G/17G
17 POWDER, FOR SOLUTION ORAL DAILY PRN
Qty: 119 G | Refills: 0 | Status: SHIPPED | OUTPATIENT
Start: 2025-06-25

## 2025-06-25 RX ORDER — ACETAMINOPHEN 500 MG
1000 TABLET ORAL 3 TIMES DAILY
Qty: 42 TABLET | Refills: 0 | Status: SHIPPED | OUTPATIENT
Start: 2025-06-25 | End: 2025-07-02

## 2025-06-25 RX ADMIN — ACETAMINOPHEN 650 MG: 325 TABLET ORAL at 05:00

## 2025-06-25 RX ADMIN — CEFAZOLIN SODIUM 2 G: 2 SOLUTION INTRAVENOUS at 02:00

## 2025-06-25 RX ADMIN — ACETAMINOPHEN 650 MG: 325 TABLET ORAL at 00:17

## 2025-06-25 RX ADMIN — POLYETHYLENE GLYCOL 3350 17 G: 17 POWDER, FOR SOLUTION ORAL at 08:20

## 2025-06-25 RX ADMIN — APIXABAN 2.5 MG: 2.5 TABLET, FILM COATED ORAL at 08:20

## 2025-06-25 RX ADMIN — PANTOPRAZOLE SODIUM 40 MG: 40 TABLET, DELAYED RELEASE ORAL at 05:00

## 2025-06-25 RX ADMIN — SERTRALINE HYDROCHLORIDE 150 MG: 50 TABLET ORAL at 08:20

## 2025-06-25 RX ADMIN — CYCLOBENZAPRINE 5 MG: 10 TABLET, FILM COATED ORAL at 02:12

## 2025-06-25 RX ADMIN — KETOROLAC TROMETHAMINE 15 MG: 30 INJECTION, SOLUTION INTRAMUSCULAR at 05:00

## 2025-06-25 RX ADMIN — OXYCODONE HYDROCHLORIDE 10 MG: 5 TABLET ORAL at 11:03

## 2025-06-25 RX ADMIN — OXYCODONE HYDROCHLORIDE 10 MG: 5 TABLET ORAL at 04:48

## 2025-06-25 RX ADMIN — DOCUSATE SODIUM 100 MG: 100 CAPSULE, LIQUID FILLED ORAL at 08:20

## 2025-06-25 ASSESSMENT — COGNITIVE AND FUNCTIONAL STATUS - GENERAL
TOILETING: A LITTLE
HELP NEEDED FOR BATHING: A LITTLE
DAILY ACTIVITIY SCORE: 20
MOBILITY SCORE: 18
DRESSING REGULAR LOWER BODY CLOTHING: A LOT
HELP NEEDED FOR BATHING: A LITTLE
MOVING TO AND FROM BED TO CHAIR: A LITTLE
DRESSING REGULAR LOWER BODY CLOTHING: A LOT
STANDING UP FROM CHAIR USING ARMS: A LITTLE
TURNING FROM BACK TO SIDE WHILE IN FLAT BAD: A LITTLE
WALKING IN HOSPITAL ROOM: A LITTLE
DAILY ACTIVITIY SCORE: 20
CLIMB 3 TO 5 STEPS WITH RAILING: A LOT
TOILETING: A LITTLE

## 2025-06-25 ASSESSMENT — PAIN SCALES - GENERAL
PAINLEVEL_OUTOF10: 4
PAINLEVEL_OUTOF10: 6
PAINLEVEL_OUTOF10: 9
PAINLEVEL_OUTOF10: 8
PAINLEVEL_OUTOF10: 8
PAINLEVEL_OUTOF10: 2

## 2025-06-25 ASSESSMENT — ACTIVITIES OF DAILY LIVING (ADL)
BATHING_ASSISTANCE: MINIMAL
ADL_ASSISTANCE: INDEPENDENT
HOME_MANAGEMENT_TIME_ENTRY: 34

## 2025-06-25 ASSESSMENT — PAIN - FUNCTIONAL ASSESSMENT
PAIN_FUNCTIONAL_ASSESSMENT: 0-10

## 2025-06-25 ASSESSMENT — PAIN DESCRIPTION - DESCRIPTORS
DESCRIPTORS: ACHING
DESCRIPTORS: ACHING;BURNING
DESCRIPTORS: ACHING

## 2025-06-25 NOTE — DISCHARGE INSTRUCTIONS
PAIN MANAGEMENT AT HOME:  To provide the best pain control over the next few days when pain will be at it's worst, we suggest you continue to take the following medications routinely and separately to provide the best pain management.  In addition, apply ice VERY FREQUENTLY to incision.   Also use some type of alternative intervention such as music therapy, relaxation techniques, or an type of diversion (such as watching television or talking to a friend) to help control pain.  Do NOT take more than 4000mg of acetaminophen (tylenol) in 24 hours.        CONSTIPATION MANAGEMENT AT HOME:  Constipation is common after Joint Replacement surgery for several reasons.  A common side effect of all pain medication is constipation.  A decrease in your activity as well as change in your normal diet & appetite all contribute to the constipation.  At home, it is important to take a daily stool softener such as Colace, Milk of magnesium or senokot while you are taking pain medications to help manage the constipation.  In addition, if you do NOT have a bowel movement within 72 hours of discharge, add a laxative such as miralax.  Miralax normally takes 2 to 3 days to work so you will not receive immediate results.  It is also important to drink plenty of fluids, especially water.  Stool softeners & laxatives need water to work properly.  We also suggest you increase your fiber intake either with foods high in fiber or with some type of supplement such as benefiber or metamucil.    Foods that are high in fiber include:     Fruits such as pears, strawberries, avocado, apples, raspberries, bananas, kiwis   Vegetables such as carrots, beets, broccoli, brussel sprouts, spinach   Lentils and kidney beans   Split peas and chickpeas   Oats, almonds, oswaldo seeds, and sweet potatoes      ACTIVITY AT HOME:  You will need to continue with your therapy after discharge either with in home therapy or at an outpatient facility.  Most patients  initially do in home therapy for about two weeks then transition to outpatient therapy.  You have freedom of choice in which in home therapy and outpatient facility you plan to use.  Most in home therapy sessions will begin within 24 to 48 hours after discharge.  After about two weeks of in home therapy, you will most likely to ready for outpatient therapy sessions.  Outpatient therapy is normally twice a week for weeks.  While you are at home it is important that you perform the exercises the therapist went over with you in the hospital 2 to 3 times a day.  After you complete your exercises, apply ice to your incision to help with swelling and pain.  You should also be getting up and walking around your house every hour with the use of your walker.  While at rest, perform, ankle pumps on each foot at least ten times.  This will help with the swelling as well as decrease your risk for blood clots.  ALWAYS USE YOUR WALKER WITH ANY TYPE OF ACTIVITY!!!!    WOUND CARE:  The bandage on your incision will stay in place for 7 days.  The bandage is waterproof so you may shower with the bandage in place.  No need to wrap or cover it.  Some drainage on your bandage is perfectly normal.  Home health care will assist you with bandage removal.  Once the bandage is removed, your incision can be left open to air if there is no drainage present.  If your incision is draining at the time of bandage removal, home health care will assist you with applying a new gauze bandage.  Gauze bandages are NOT waterproof.  After the removal of the waterproof bandage, do NOT get your incision wet.  Swelling in your operative extremity will peak about 72 hours after surgery and can last for weeks.  To help with the swelling make sure you are elevating your leg and apply ice frequently.  In addition, you will receive compression stockings upon discharge from the hospital.  Make sure you are wearing these stockings on both your legs at home.   Generally we instruct you to wear during the day and remove at bedtime for the next 4 weeks.      *Apply ice to incision at least 5 times daily    *Wear bilateral thaddeus hose stockings to lower extremities for next 4 weeks    *Do NOT take any non steroidal anti-inflammatory medications such as motrin, aleve, ibuprofen, celebrex or meloxicam    *Take daily stool softener.  If you experience any diarrhea, stop medication    *If you need a refill on your pain medication, contact your surgeon's office Monday through Thursday with as least 24 hours notice    Approximately one year after your surgery, you will be asked to complete a patient reported outcome measure survey.  This is one of the surveys you completed prior to surgery.  In order to measure our program outcomes, it is important that we get feedback from our patients on their progress.  In addition, having patients complete these forms both prior as well as after surgery is a government regulation.  This survey will be sent to you on your [employer] account.  Please take a few minutes to complete.  If you have any questions about the survey and/or need assistance, please contact Yesika Pretty.    If you have any questions or concerns please contact the Orthopedic  Yesika Pretty at:  [phone]  [email or web address]  Message on  [employer] via secure chat    Nara Keith RN, Unit Clinical Coordinator  [phone]    Thank you for choosing [facility] to have your total joint replacement surgery.  It was our pleasure assisting you in your recovery.

## 2025-06-25 NOTE — PROGRESS NOTES
Occupational Therapy    Occupational Therapy    Evaluation    Patient Name: Nicolasa Burger  MRN: [id number]  Today's Date: 6/25/2025  Time Calculation  Start Time: 0913  Stop Time: 0931  Time Calculation (min): 18 min  211/211-A    Assessment  IP OT Assessment  OT Assessment: Pt. is s/p surgery and demonstrates impairments in adls, transfers, and ambulation. Pt. would benefit from skilled OT services to maximize independence and promote return to prior level of function.  Prognosis: Good  Barriers to Discharge Home: No anticipated barriers  Evaluation/Treatment Tolerance: Patient tolerated treatment well  Medical Staff Made Aware: Yes  End of Session Communication: Bedside nurse  End of Session Patient Position: Up in chair, Alarm off, not on at start of session (call light in reach. all needs met)    Plan:  Treatment Interventions: ADL retraining, Functional transfer training, Endurance training, Patient/family training, Equipment evaluation/education  OT Frequency: Daily  OT Discharge Recommendations: Low intensity level of continued care  OT Recommended Transfer Status: Stand by assist  OT - OK to Discharge: Yes (once cleared by medical team)    Subjective     Current Problem:  1. Primary osteoarthritis of right hip  Surgical Pathology Exam    Surgical Pathology Exam    acetaminophen (Tylenol) 500 mg tablet    apixaban (Eliquis) 2.5 mg tablet    docusate sodium (Colace) 100 mg capsule    oxyCODONE (Roxicodone) 5 mg immediate release tablet    polyethylene glycol (Glycolax, Miralax) 17 gram/dose powder      2. Arthritis of left hip  cyclobenzaprine (Flexeril) 5 mg tablet          General:  General  Reason for Referral: OT eval and treat due to recent surgery (s/p R THR (anterior approach) on 6/24/25)  Referred By: Dr. 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 pleasant and agreeable to therapy evaluation    Precautions:  LE Weight Bearing Status: Weight Bearing as Tolerated  Medical Precautions: Fall precautions    Pain:  Pain Assessment  Pain Assessment: 0-10  0-10 (Numeric) Pain Score: 8 (R hip)    Objective     Cognition:  Overall Cognitive Status: Within Functional Limits     Home Living:  Type of Home: House  Lives With: Spouse (and 19 year old dtr.)  Home Adaptive Equipment: Walker rolling or standard, Cane, Reacher  Home Layout: One level, Laundry main level  Home Access: Stairs to enter with rails (2 steps)  Bathroom Shower/Tub: Walk-in shower (chair, grab bars, hhs)  Bathroom Toilet:  (toilet riser with bilat. railings)     Prior Function:  Level of [place]: Independent with ADLs and functional transfers, Independent with homemaking with ambulation  ADL Assistance: Independent  Homemaking Assistance: Independent  Ambulatory Assistance: Independent (st. cane recently)  Hand Dominance: Right    IADL History:  Homemaking Responsibilities: Yes  IADL Comments: pt. ind. with homemaking, drives, shops    ADL:  Eating Assistance: Independent  Grooming Assistance: Independent  Bathing Assistance: Minimal  UE Dressing Assistance: Independent  LE Dressing Assistance: Maximal  Toileting Assistance with Device: Stand by    Bed Mobility/Transfers:   Bed Mobility  Bed Mobility: No  Transfers  Transfer:  (sit to stand with sba/cga with cues)    Ambulation/Gait Training:  Functional Mobility  Functional Mobility Performed:  (pt. ambulated to and from bathroom with rolling walker with cga and cues for proper walker sequencing)    Sitting Balance:  Static Sitting Balance  Static Sitting-Level of Assistance: Independent    Standing Balance:  Static Standing Balance  Static Standing-Level of Assistance: Close supervision    Sensation:  Sensation  Comment: pt. c/o R hand tingling    Strength:  Strength Comments: bue strength wfl    Coordination:  Movements are Fluid and Coordinated: Yes     Hand Function:  Hand Function  Gross Grasp: Functional  Coordination: Functional    Extremities:   RUE : Within Functional Limits   LUE: Within Functional Limits    Outcome Measures: [facility] Daily Activity  Putting on and taking off regular lower body clothing: A lot  Bathing (including washing, rinsing, drying): A little  Putting on and taking off regular upper body clothing: None  Toileting, which includes using toilet, bedpan or urinal: A little  Taking care of personal grooming such as brushing teeth: None  Eating Meals: None  Daily Activity - Total Score: 20     EDUCATION:     Education Documentation  ADL Training, taught by Brook Abdi, OT at 6/25/2025  3:05 PM.  Learner: Patient  Readiness: Acceptance  Method: Explanation  Response: Verbalizes Understanding    Education Comments  No comments found.        Goals:   Encounter Problems       Encounter Problems (Active)       OT Goals       Pt. will perform lower body bathing and dressing with sba using adaptive equipment as needed.  (Progressing)       Start:  06/25/25    Expected End:  06/26/25            Pt. will perform toileting with supervision only using dme/adaptive equipment as needed.  (Progressing)       Start:  06/25/25    Expected End:  06/26/25            Pt. will perform bed mobility/chair/commode/simulated car/shower chair transfers with supervision. (Progressing)       Start:  06/25/25    Expected End:  06/26/25            Pt. will perform functional mobility with rolling walker with supervision only in prep. for homemaking tasks.  (Progressing)       Start:  06/25/25    Expected End:  06/26/25            Pt. will demonstrate knowledge and application of ANTERIOR THR precautions during adl tasks.  (Progressing)       Start:  06/25/25    Expected End:  06/26/25

## 2025-06-25 NOTE — PROGRESS NOTES
Occupational Therapy    OT Treatment    Patient Name: Nicolasa Burger  MRN: [id number]  Department:   Room: [address] [place] [address]  Today's Date: 6/25/2025  Time Calculation  Start Time: 1021  Stop Time: 1055  Time Calculation (min): 34 min        Assessment:  End of Session Communication: Bedside nurse  End of Session Patient Position: Up in chair, Alarm off, not on at start of session     Plan:  Treatment Interventions: ADL retraining, Functional transfer training, Endurance training, Patient/family training, Equipment evaluation/education  OT Frequency: Daily  OT Discharge Recommendations: Low intensity level of continued care  OT Recommended Transfer Status: Stand by assist  OT - OK to Discharge: Yes (once cleared by medical team)  Treatment Interventions: ADL retraining, Functional transfer training, Endurance training, Patient/family training, Equipment evaluation/education    Subjective   OT Visit Info:     General Visit Info:  General  Prior to Session Communication: Bedside nurse  Patient Position Received: Up in chair, Alarm off, not on at start of session  General Comment: pleasant and cooperative  Precautions:  LE Weight Bearing Status: Weight Bearing as Tolerated  Medical Precautions: Fall precautions  Post-Surgical Precautions: Right hip precautions            Pain:  Pain Assessment  Pain Assessment:  (7-8/10 in R hip, nursing notified and to medicate, ice pack applied at end of therapy session)    Objective    Cognition:  Cognition  Overall Cognitive Status: Within Functional Limits       Activities of Daily Living: UE Dressing  UE Dressing Level of Assistance: Setup  UE Dressing Where Assessed: Chair  UE Dressing Comments: don pullover dress    LE Dressing  LE Dressing: Yes  Sock Level of Assistance: Maximum assistance  Compression Hose Level of Assistance: Maximum assistance. Pt educated on use of compression stockings, importance of compression stockings, donning/doffing, and wear schedule.  Adult Briefs Level of  Assistance: Close supervision  LE Dressing Where Assessed: Chair  LE Dressing Comments: pt declined AE having assistance from spouse and daughter upon home going  Pt educated on THR precautions as applied to self care tasks and transfers throughout tx session. Pt verbalized and demonstrated understanding.       Toileting  Toileting Level of Assistance: Close supervision  Where Assessed: Toilet  Functional Standing Tolerance:  Time: 4:00 standing at FWW  Bed Mobility/Transfers: Transfers  Transfer: Yes  Transfer 1  Technique 1: Stand to sit, Sit to stand  Transfer Device 1: Walker  Transfer Level of Assistance 1: Close supervision    Toilet Transfers  Toilet Transfer From: Rolling walker  Toilet Transfer Type: To and from  Toilet Transfer to: Raised toilet seat with rails  Toilet Transfer Technique: Ambulating  Toilet Transfers: Supervision  Car Transfers  Car Transfers Comments: .Pt educated on transferring in/out of car adhering to precautions. Following demonstration from this therapist pt verbalized understanding.         Functional Mobility:  Functional Mobility  Functional Mobility Performed: Yes  Functional Mobility 1  Device 1: Rolling walker  Assistance 1: Close supervision  Comments 1: pt ambulated in/out of bathroom with min vc for increased safety      Outcome Measures:[facility] Daily Activity  Putting on and taking off regular lower body clothing: A lot  Bathing (including washing, rinsing, drying): A little  Putting on and taking off regular upper body clothing: None  Toileting, which includes using toilet, bedpan or urinal: A little  Taking care of personal grooming such as brushing teeth: None  Eating Meals: None  Daily Activity - Total Score: 20        Education Documentation  ADL Training, taught by YADIRA Barrientos at 6/25/2025  3:24 PM.  Learner: Patient  Readiness: Acceptance  Method: Explanation  Response: Verbalizes Understanding    Precautions, taught by YADIRA Barrientos at 6/25/2025   3:24 PM.  Learner: Patient  Readiness: Acceptance  Method: Explanation  Response: Verbalizes Understanding    Education Comments  No comments found.           Goals:  Encounter Problems       Encounter Problems (Active)       OT Goals       Pt. will perform lower body bathing and dressing with sba using adaptive equipment as needed.  (Progressing)       Start:  06/25/25    Expected End:  06/26/25            Pt. will perform toileting with supervision only using dme/adaptive equipment as needed.  (Progressing)       Start:  06/25/25    Expected End:  06/26/25            Pt. will perform bed mobility/chair/commode/simulated car/shower chair transfers with supervision. (Progressing)       Start:  06/25/25    Expected End:  06/26/25            Pt. will perform functional mobility with rolling walker with supervision only in prep. for homemaking tasks.  (Progressing)       Start:  06/25/25    Expected End:  06/26/25            Pt. will demonstrate knowledge and application of ANTERIOR THR precautions during adl tasks.  (Progressing)       Start:  06/25/25    Expected End:  06/26/25

## 2025-06-25 NOTE — PROGRESS NOTES
Patient did not attend pre operative educational TJR class, however she is s/p previous surgery 2/2025.  All preoperative PROMS surveys completed.      Discussed discharge plan with patient.  For discharge home today with in home therapy follow up.  Verified with patient she plans to utilize in home therapy follow up.  Instructed patient to call contact number upon discharge to arrange appointment for tomorrow.    Reviewed discharge instructions with patient.  Patient verbalizes understanding of activity, wound care, pain management, home going medications and follow up care.  TJR zone form and new medication administration form provided to patient.

## 2025-06-25 NOTE — CONSULTS
Reason For Consult  mm    History Of Present Illness  Nicolasa Burger is a 59 y.o. female presenting with right hip replacement, doing well post op, no issues no pain.     Past Medical History  She has a past medical history of Acute upper respiratory infection, unspecified (11/01/2021), Anxiety, Arthritis, Asthma, Chronic bronchitis (Multi), CVA (cerebral vascular accident) (Multi), Dysphagia, Easy bruising, Effusion, unspecified knee (07/02/2021), Encounter for screening for malignant neoplasm of colon (05/06/2015), GERD (gastroesophageal reflux disease), Hypertension, Joint pain, Pain in left knee (07/06/2021), Pain in left knee (09/07/2017), Pain in left lower leg (07/02/2021), Pain in right finger(s), Pancreatitis ([facility]), Personal history of other diseases of the nervous system and sense organs (06/28/2016), Personal history of other diseases of the nervous system and sense organs, Personal history of other diseases of the respiratory system (02/10/2021), Personal history of other endocrine, nutritional and metabolic disease, Personal history of other infectious and parasitic diseases, Personal history of other infectious and parasitic diseases, Personal history of other infectious and parasitic diseases (07/13/2021), Personal history of other infectious and parasitic diseases (11/04/2021), Personal history of other medical treatment, Personal history of other medical treatment, Personal history of other specified conditions (07/19/2021), PFO (patent foramen ovale) ([facility]), Pneumonia, Right upper quadrant pain (07/29/2015), Segmental and somatic dysfunction of cervical region (12/12/2015), Shortness of breath, Strain of other muscle(s) and tendon(s) at lower leg level, left leg, initial encounter (07/02/2021), Strain of unspecified muscle(s) and tendon(s) at lower leg level, left leg, initial encounter (07/02/2021), Unilateral primary osteoarthritis, left knee (10/20/2016), Unspecified acute conjunctivitis,  "bilateral (2017), Unspecified tear of unspecified meniscus, current injury, left knee, initial encounter (2017), Urinary tract infection, site not specified (2021), and Vision loss.    Surgical History  She has a past surgical history that includes  section, classic (2015); Endometrial ablation (2015); Belt abdominoplasty (2015); Other surgical history (2015); Appendectomy (2015); Other surgical history (2021); Other surgical history (2022); CT angio head w and wo IV contrast (2021); CT angio neck (2021); Patent foramen ovale closure; Breast biopsy; Colonoscopy; Upper gastrointestinal endoscopy; Hip Arthroplasty;  section, low transverse; Knee Arthroplasty; and pr breast augmentation with implant.     Social History  She reports that she has never smoked. She has never used smokeless tobacco. She reports current alcohol use. She reports that she does not use drugs.    Family History  Family History[1]     Allergies  Patient has no known allergies.    Review of Systems  10 ro snegative except per above     Physical Exam  Gen: no acute distress  Cv: RRR  Res: CTAB  GI: soft non tender  Ext: bandage in place, no swelling no drainage     Last Recorded Vitals  Blood pressure 109/71, pulse 63, temperature 36.9 °C (98.4 °F), temperature source Temporal, resp. rate 20, height 1.676 m (5' 6\"), weight 61 kg (134 lb 7.7 oz), SpO2 96%.    Relevant Results       Assessment/Plan     #right hip oa  #Asthma  #anxiety  - stable  - surgery went well  - pt/ot  - ok to dc    I spent 55 minutes in the professional and overall care of this patient.      Harvinder Hussein DO         [1]   Family History  Problem Relation Name Age of Onset    Anxiety disorder Mother      Arthritis Mother      Arthritis Father          seasonal    Alcohol abuse Father          severe    Yun's esophagus Father      Breast cancer Sister      Diabetes Maternal Grandmother   "

## 2025-06-25 NOTE — PROGRESS NOTES
Physical Therapy    Physical Therapy Treatment    Patient Name: Nicolasa Burger  MRN: [id number]  Department: [facility]  Room: [address] [place], [place] [address]  Today's Date: 6/25/2025  Time Calculation  Start Time: 1116  Stop Time: 1201  Time Calculation (min): 45 min         Assessment/Plan         PT Plan  Treatment/Interventions: Bed mobility, Transfer training, Gait training, Balance training, Strengthening, Therapeutic exercise, Therapeutic activity, Stair training  PT Plan: Ongoing PT  PT Frequency: Daily  PT Discharge Recommendations: Low intensity level of continued care  PT Recommended Transfer Status: Assist x1  PT - OK to Discharge: Yes (once medically cleared)    PT Visit Info:  PT Received On: 06/25/25     General Visit Information:        Subjective   Precautions:               Objective   Pain: r hip 8/10  and left quad 7/10  was given pain meds                            Treatments:  Therapeutic Exercise  Therapeutic Exercise Performed:  (supine rle ap's, qs,gs,hs,saqs,abd,laqs x 20 reps ea  cold pack to r hip and left quad post rx)    Bed Mobility  Bed Mobility:  (supine to sit and sit to supine sba)    Ambulation/Gait Training  Ambulation/Gait Training Performed:  (ambulated 75 feet x 2 using fixed wheeled walker sba)  Transfers  Transfer:  (sit to stand sba)         Outcome Measures:  [facility] Basic Mobility  Turning from your back to your side while in a flat bed without using bedrails: None  Moving from lying on your back to sitting on the side of a flat bed without using bedrails: A little  Moving to and from bed to chair (including a wheelchair): A little  Standing up from a chair using your arms (e.g. wheelchair or bedside chair): A little  To walk in hospital room: A little  Climbing 3-5 steps with railing: A lot  Basic Mobility - Total Score: 18    Education Documentation  Precautions, taught by Mary Ellen Miller PTA at 6/25/2025 12:38 PM.  Learner: Patient  Readiness: Acceptance  Method: Demonstration  Response:  Demonstrated Understanding    Mobility Training, taught by Mary Ellen Miller PTA at 6/25/2025 12:38 PM.  Learner: Patient  Readiness: Acceptance  Method: Demonstration  Response: Demonstrated Understanding    Education Comments  No comments found.               Encounter Problems       Encounter Problems (Active)       PT Problem       STG - Pt will perform a B LE ther ex program of 2-3 sets of 10  (Progressing)       Start:  06/24/25    Expected End:  07/08/25            STG - Pt will transition supine <> sitting with SBA (Progressing)       Start:  06/24/25    Expected End:  07/08/25            STG - Pt will transfer STS with SBA (Progressing)       Start:  06/24/25    Expected End:  07/08/25            STG - Pt will amb 100' using w/w with SBA  (Progressing)       Start:  06/24/25    Expected End:  07/08/25

## 2025-06-25 NOTE — CARE PLAN
Problem: Safety - Adult  Goal: Free from fall injury  6/25/2025 1244 by Brandie Kumar RN  Outcome: Met  6/25/2025 1005 by Brandie Kumar RN  Outcome: Progressing     Problem: Discharge Planning  Goal: Discharge to home or other facility with appropriate resources  6/25/2025 1244 by Brandie Kumar RN  Outcome: Met  6/25/2025 1005 by Brandie Kumar RN  Outcome: Progressing     Problem: Chronic Conditions and Co-morbidities  Goal: Patient's chronic conditions and co-morbidity symptoms are monitored and maintained or improved  6/25/2025 1244 by Brandie Kumar RN  Outcome: Met  6/25/2025 1005 by Brandie Kumar RN  Outcome: Progressing     Problem: Nutrition  Goal: Nutrient intake appropriate for maintaining nutritional needs  6/25/2025 1244 by Brandie Kumar RN  Outcome: Met  6/25/2025 1005 by Brandie Kumar RN  Outcome: Progressing     Problem: Fall/Injury  Goal: Not fall by end of shift  6/25/2025 1244 by Brandie Kumar RN  Outcome: Met  6/25/2025 1005 by Brandie Kumar RN  Outcome: Progressing  Goal: Be free from injury by end of the shift  6/25/2025 1244 by Brandie Kumar RN  Outcome: Met  6/25/2025 1005 by Brandie Kumar RN  Outcome: Progressing  Goal: Verbalize understanding of personal risk factors for fall in the hospital  6/25/2025 1244 by Brandie Kumar RN  Outcome: Met  6/25/2025 1005 by Brandie Kumar RN  Outcome: Progressing  Goal: Verbalize understanding of risk factor reduction measures to prevent injury from fall in the home  6/25/2025 1244 by Brandie Kumar RN  Outcome: Met  6/25/2025 1005 by Brandie Kumar RN  Outcome: Progressing  Goal: Use assistive devices by end of the shift  6/25/2025 1244 by Brandie Kumar RN  Outcome: Met  6/25/2025 1005 by Brandie Kumar RN  Outcome: Progressing  Goal: Pace activities to prevent fatigue by end of the shift  6/25/2025 1244 by Brandie Kumar RN  Outcome: Met  6/25/2025 1005 by Brandie Kumar RN  Outcome: Progressing     Problem: Patient is at risk for or has  history of falls  Goal: Patient will not sustain a fall as a result of home safety mitigation  6/25/2025 1244 by Brandie Kumar RN  Outcome: Met  6/25/2025 1005 by Brandie Kumar RN  Outcome: Progressing     Problem: Pain  Goal: Takes deep breaths with improved pain control throughout the shift  6/25/2025 1244 by Brandie Kumar RN  Outcome: Met  6/25/2025 1005 by Brandie Kumar RN  Outcome: Progressing  Goal: Turns in bed with improved pain control throughout the shift  6/25/2025 1244 by Brandie Kumar RN  Outcome: Met  6/25/2025 1005 by Brandie Kumar RN  Outcome: Progressing  Goal: Walks with improved pain control throughout the shift  6/25/2025 1244 by Brandie Kumar RN  Outcome: Met  6/25/2025 1005 by Brandie Kumar RN  Outcome: Progressing  Goal: Performs ADL's with improved pain control throughout shift  6/25/2025 1244 by Barndie Kumar RN  Outcome: Met  6/25/2025 1005 by Brandie Kumar RN  Outcome: Progressing  Goal: Participates in PT with improved pain control throughout the shift  6/25/2025 1244 by Brandie Kumar RN  Outcome: Met  6/25/2025 1005 by Brandie Kumar RN  Outcome: Progressing  Goal: Free from opioid side effects throughout the shift  6/25/2025 1244 by Brandie Kumar RN  Outcome: Met  6/25/2025 1005 by Brandie Kumar RN  Outcome: Progressing  Goal: Free from acute confusion related to pain meds throughout the shift  6/25/2025 1244 by Brandie Kumar RN  Outcome: Met  6/25/2025 1005 by Brandie Kumar RN  Outcome: Progressing     Problem: Skin  Goal: Decreased wound size/increased tissue granulation at next dressing change  6/25/2025 1244 by Brandie Kumar RN  Outcome: Met  6/25/2025 1005 by Brandie Kumar RN  Outcome: Progressing  Goal: Participates in plan/prevention/treatment measures  6/25/2025 1244 by Brandie Kumar RN  Outcome: Met  6/25/2025 1005 by Brandie Kumar RN  Outcome: Progressing  Goal: Prevent/manage excess moisture  6/25/2025 1244 by Brandie Kumar RN  Outcome: Met  6/25/2025 1005  by Brandie José, RN  Outcome: Progressing  Goal: Prevent/minimize sheer/friction injuries  6/25/2025 1244 by Brandie Kumar RN  Outcome: Met  6/25/2025 1005 by Brandie Kumar RN  Outcome: Progressing  Goal: Promote/optimize nutrition  6/25/2025 1244 by Brandie Kumar RN  Outcome: Met  6/25/2025 1005 by Brandie Kumar RN  Outcome: Progressing  Goal: Promote skin healing  6/25/2025 1244 by Brandie Kumar RN  Outcome: Met  6/25/2025 1005 by Brandie Kumar RN  Outcome: Progressing

## 2025-06-25 NOTE — CARE PLAN
Problem: Safety - Adult  Goal: Free from fall injury  Outcome: Progressing     Problem: Discharge Planning  Goal: Discharge to home or other facility with appropriate resources  Outcome: Progressing     Problem: Chronic Conditions and Co-morbidities  Goal: Patient's chronic conditions and co-morbidity symptoms are monitored and maintained or improved  Outcome: Progressing     Problem: Nutrition  Goal: Nutrient intake appropriate for maintaining nutritional needs  Outcome: Progressing     Problem: Fall/Injury  Goal: Not fall by end of shift  Outcome: Progressing  Goal: Be free from injury by end of the shift  Outcome: Progressing  Goal: Verbalize understanding of personal risk factors for fall in the hospital  Outcome: Progressing  Goal: Verbalize understanding of risk factor reduction measures to prevent injury from fall in the home  Outcome: Progressing  Goal: Use assistive devices by end of the shift  Outcome: Progressing  Goal: Pace activities to prevent fatigue by end of the shift  Outcome: Progressing     Problem: Patient is at risk for or has history of falls  Goal: Patient will not sustain a fall as a result of home safety mitigation  Outcome: Progressing     Problem: Pain  Goal: Takes deep breaths with improved pain control throughout the shift  Outcome: Progressing  Goal: Turns in bed with improved pain control throughout the shift  Outcome: Progressing  Goal: Walks with improved pain control throughout the shift  Outcome: Progressing  Goal: Performs ADL's with improved pain control throughout shift  Outcome: Progressing  Goal: Participates in PT with improved pain control throughout the shift  Outcome: Progressing  Goal: Free from opioid side effects throughout the shift  Outcome: Progressing  Goal: Free from acute confusion related to pain meds throughout the shift  Outcome: Progressing     Problem: Skin  Goal: Decreased wound size/increased tissue granulation at next dressing change  Outcome:  Progressing  Goal: Participates in plan/prevention/treatment measures  Outcome: Progressing  Goal: Prevent/manage excess moisture  Outcome: Progressing  Goal: Prevent/minimize sheer/friction injuries  Outcome: Progressing  Goal: Promote/optimize nutrition  Outcome: Progressing  Goal: Promote skin healing  Outcome: Progressing

## 2025-06-25 NOTE — DISCHARGE SUMMARY
Discharge Diagnosis  Arthritis of right hip    Issues Requiring Follow-Up  None     Test Results Pending At Discharge  Pending Labs       Order Current Status    Surgical Pathology Exam In process            Hospital Course   Patient is a 59 year old female with severe osteoarthritis of right hip.  Prior medical history includes asthma, CVA, PEDRO and anxiety.  On 6/24/25, patient underwent uncomplicated right total hip replacement via anterior approach by Dr Neo Brooke.  General anesthesia was utilized and estimated blood loss intraoperatively was 200cc.  Patient received one dose of IV antibiotics preoperatively and 2 additional doses were administered postoperatively.  Medical physician was placed on consult to help assist with post op medical management.  For DVT prophylaxis, patient was ordered eliquis 2.5mg twice a day and bilateral sCDs. Physical and occupational therapy were initiated with full weight bearing status.     Patient progressed well with therapy.  Her hemoglobin level was stable at 12.8 and she remained afebrile.  On POD #1, patient was medically stable and safe for discharge home with in home therapy follow up.  At time of discharge, patient was ambulating 75 feet with use of wheeled walker and stand by assistance.  She was slightly hypotensive and her usual home dose of metoprolol was held.  Home going script of eliquis 2.5mg twice daily for 3 weeks was provided to patient along with bilateral thaddeus hose compression stockings to be worn at home for 4 weeks.  For pain management, oxycodone and flexeril scripts were given.  Anticipate need for extended use of narcotic medications as well as higher MED requirements based on usual post operative incisional pain following total joint replacement surgery.  Patient will follow up in office with Dr Brooke in 2 weeks.      Visit Vitals  /71 (BP Location: Right arm, Patient Position: Sitting)   Pulse 63   Temp 36.9 °C (98.4 °F) (Temporal)   Resp 20      Vitals:    06/24/25 0921   Weight: 61 kg (134 lb 7.7 oz)       Immunization History   Administered Date(s) Administered    Flu vaccine (IIV4), preservative free *Check age/dose* 11/30/2021    Tdap vaccine, age 7 year and older (BOOSTRIX, ADACEL) 05/06/2015       Results        Pertinent Physical Exam At Time of Discharge  Physical Exam    Home Medications     Medication List      PAUSE taking these medications     aspirin 81 mg EC tablet; Wait to take this until: July 9, 2025     START taking these medications     docusate sodium 100 mg capsule; Commonly known as: Colace; Take 1   capsule (100 mg) by mouth 2 times a day for 14 days.   Eliquis 2.5 mg tablet; Generic drug: apixaban; Take 1 tablet (2.5 mg) by   mouth every 12 hours for 42 doses.   oxyCODONE 5 mg immediate release tablet; Commonly known as: Roxicodone;   Take 1-2 tablets (5-10 mg) by mouth every 6 hours if needed for severe   pain (7 - 10) for up to 7 days.   polyethylene glycol 17 gram/dose powder; Commonly known as: Glycolax,   Miralax; Mix 1 capful (17 g) of powder in 4 to 8 ounces of a liquid and   drink once daily as needed (for constipation).     CHANGE how you take these medications     acetaminophen 500 mg tablet; Commonly known as: Tylenol; Take 2 tablets   (1,000 mg) by mouth 3 times a day for 7 days.; What changed: medication   strength, how much to take     CONTINUE taking these medications     albuterol 90 mcg/actuation inhaler   azelastine 137 mcg (0.1 %) nasal spray; Commonly known as: Astelin;   Administer 2 sprays into each nostril 2 times a day.   b complex 0.4 mg tablet   cyanocobalamin (vitamin B-12) 1,000 mcg lozenge; 1 lozenge po qday   cyclobenzaprine 5 mg tablet; Commonly known as: Flexeril; Take 1 tablet   (5 mg) by mouth 3 times a day as needed for muscle spasms for up to 5   days.   famotidine 20 mg tablet; Commonly known as: Pepcid   magnesium oxide 400 mg tablet; Commonly known as: Mag-Ox   metoprolol succinate XL 50 mg  24 hr tablet; Commonly known as:   Toprol-XL; Take 1 tablet (50 mg) by mouth once daily. Do not crush or   chew.   omeprazole 40 mg DR capsule; Commonly known as: PriLOSEC; Take 1 capsule   (40 mg) by mouth 2 times a day before meals. Do not crush or chew.   sertraline 100 mg tablet; Commonly known as: Zoloft; Take 1.5 tablets   (150 mg) by mouth once daily.     STOP taking these medications     ondansetron 4 mg tablet; Commonly known as: Zofran       Outpatient Follow-Up  Future Appointments   Date Time Provider Department Center   6/10/2026  1:15 PM Cheikh Jones MD PhD [id number] [place]       [facility]

## 2026-06-10 ENCOUNTER — APPOINTMENT (OUTPATIENT)
Dept: CARDIOLOGY | Facility: CLINIC | Age: 61
End: 2026-06-10
Payer: COMMERCIAL

## (undated) DEVICE — PACK, ANTERIOR HIP REPLACEMENT KIT, CUSTOM, PARMA

## (undated) DEVICE — GOWN, ASTOUND, XL

## (undated) DEVICE — SYRINGE, 50 CC, LUER LOCK

## (undated) DEVICE — TIP, SUCTION, SUPER SUCKER, KAM, MINI, CURVED

## (undated) DEVICE — TUBING, IRRIGATION, HIGH FLOW, HAND PIECE SET

## (undated) DEVICE — BASIN KIT, SINGLE

## (undated) DEVICE — TUBING, CLEAR N-COND, 5MM X 10, LF

## (undated) DEVICE — DRAPE, SHEET, THREE QUARTER, FAN FOLD, 57 X 77 IN

## (undated) DEVICE — WOUND SYSTEM, DEBRIDEMENT & CLEANING, O.R DUOPAK

## (undated) DEVICE — HOOD, SURGICAL, FLYTE HYBRID

## (undated) DEVICE — BLADE, SAGITTAL DUAL CUT, 25 X 90 X 1.27

## (undated) DEVICE — PHOTONSABER, W/YANKAUER, TAPER TIP

## (undated) DEVICE — DRAPE, INSTRUMENT, W/POUCH, STERI DRAPE, 7 X 11 IN, DISPOSABLE, STERILE

## (undated) DEVICE — COVER, MAYO STAND, W/PAD, 23 IN, DISPOSABLE, PLASTIC, LF, STERILE

## (undated) DEVICE — DRESSING, MEPILEX BORDER, POST-OP AG, 4 X 10 IN

## (undated) DEVICE — DRAPE, INCISE, ANTIMICROBIAL, IOBAN 2, STERI DRAPE, 23 X 33 IN, DISPOSABLE, STERILE

## (undated) DEVICE — SEALER, BIPOLAR, AQUA MANTYS 6.0

## (undated) DEVICE — TIP, SUCTION, YANKAUER, W/O VENT, FLEXIBLE, OPEN TIP, HIGH CAPACITY

## (undated) DEVICE — DRAPE, ISOLATION, ANTIMICROBIAL, W/POUCH, IOBAN 2, STERI DRAPE, 125 X 83 IN, DISPOSABLE, STERILE